# Patient Record
Sex: FEMALE | Race: WHITE | Employment: PART TIME | URBAN - METROPOLITAN AREA
[De-identification: names, ages, dates, MRNs, and addresses within clinical notes are randomized per-mention and may not be internally consistent; named-entity substitution may affect disease eponyms.]

---

## 2017-01-02 ENCOUNTER — TRANSFERRED RECORDS (OUTPATIENT)
Dept: HEALTH INFORMATION MANAGEMENT | Facility: CLINIC | Age: 37
End: 2017-01-02

## 2017-05-17 ENCOUNTER — TELEPHONE (OUTPATIENT)
Dept: FAMILY MEDICINE | Facility: CLINIC | Age: 37
End: 2017-05-17

## 2017-05-17 NOTE — TELEPHONE ENCOUNTER
Summary:    Patient is due/failing the following:   FOLLOW UP, PAP and PHQ9    Action needed:   Patient needs office visit for PAP and follow up. and Patient needs to do PHQ9.    Type of outreach:    Sent Freenom message.    Questions for provider review:    None                                                                                                                                    Yesica Donaldson       Chart routed to Care Team .        Panel Management Review      Patient has the following on her problem list:     Depression / Dysthymia review  PHQ-9 SCORE 1/5/2016 4/18/2016 12/15/2016   Total Score - - -   Total Score MyChart - 9 (Mild depression) -   Total Score 13 - 12      Patient is due for:  PHQ9      Composite cancer screening  Chart review shows that this patient is due/due soon for the following Pap Smear

## 2017-05-22 ENCOUNTER — OFFICE VISIT (OUTPATIENT)
Dept: FAMILY MEDICINE | Facility: CLINIC | Age: 37
End: 2017-05-22
Payer: COMMERCIAL

## 2017-05-22 VITALS
DIASTOLIC BLOOD PRESSURE: 60 MMHG | HEIGHT: 66 IN | RESPIRATION RATE: 12 BRPM | WEIGHT: 126.7 LBS | HEART RATE: 100 BPM | TEMPERATURE: 98.5 F | BODY MASS INDEX: 20.36 KG/M2 | OXYGEN SATURATION: 100 % | SYSTOLIC BLOOD PRESSURE: 94 MMHG

## 2017-05-22 DIAGNOSIS — D50.9 IRON DEFICIENCY ANEMIA, UNSPECIFIED IRON DEFICIENCY ANEMIA TYPE: ICD-10-CM

## 2017-05-22 DIAGNOSIS — Z13.220 LIPID SCREENING: ICD-10-CM

## 2017-05-22 DIAGNOSIS — K52.9 COLITIS: ICD-10-CM

## 2017-05-22 DIAGNOSIS — J20.9 ACUTE BRONCHITIS, UNSPECIFIED ORGANISM: ICD-10-CM

## 2017-05-22 DIAGNOSIS — J01.00 ACUTE MAXILLARY SINUSITIS, RECURRENCE NOT SPECIFIED: Primary | ICD-10-CM

## 2017-05-22 DIAGNOSIS — F33.0 MAJOR DEPRESSIVE DISORDER, RECURRENT EPISODE, MILD (H): ICD-10-CM

## 2017-05-22 LAB
BASOPHILS # BLD AUTO: 0 10E9/L (ref 0–0.2)
BASOPHILS NFR BLD AUTO: 0.2 %
CHOLEST SERPL-MCNC: 119 MG/DL
DIFFERENTIAL METHOD BLD: ABNORMAL
EOSINOPHIL # BLD AUTO: 0.2 10E9/L (ref 0–0.7)
EOSINOPHIL NFR BLD AUTO: 1.4 %
ERYTHROCYTE [DISTWIDTH] IN BLOOD BY AUTOMATED COUNT: 13.9 % (ref 10–15)
FERRITIN SERPL-MCNC: 111 NG/ML (ref 12–150)
FOLATE SERPL-MCNC: 9.8 NG/ML
HCT VFR BLD AUTO: 38.3 % (ref 35–47)
HDLC SERPL-MCNC: 31 MG/DL
HGB BLD-MCNC: 12.2 G/DL (ref 11.7–15.7)
IRON SATN MFR SERPL: 5 % (ref 15–46)
IRON SERPL-MCNC: 10 UG/DL (ref 35–180)
LDLC SERPL CALC-MCNC: 73 MG/DL
LYMPHOCYTES # BLD AUTO: 1.4 10E9/L (ref 0.8–5.3)
LYMPHOCYTES NFR BLD AUTO: 12.2 %
MCH RBC QN AUTO: 27.7 PG (ref 26.5–33)
MCHC RBC AUTO-ENTMCNC: 31.9 G/DL (ref 31.5–36.5)
MCV RBC AUTO: 87 FL (ref 78–100)
MONOCYTES # BLD AUTO: 0.8 10E9/L (ref 0–1.3)
MONOCYTES NFR BLD AUTO: 7.2 %
NEUTROPHILS # BLD AUTO: 9.2 10E9/L (ref 1.6–8.3)
NEUTROPHILS NFR BLD AUTO: 79 %
NONHDLC SERPL-MCNC: 88 MG/DL
PLATELET # BLD AUTO: 354 10E9/L (ref 150–450)
RBC # BLD AUTO: 4.41 10E12/L (ref 3.8–5.2)
TIBC SERPL-MCNC: 212 UG/DL (ref 240–430)
TRIGL SERPL-MCNC: 75 MG/DL
VIT B12 SERPL-MCNC: 404 PG/ML (ref 193–986)
WBC # BLD AUTO: 11.6 10E9/L (ref 4–11)

## 2017-05-22 PROCEDURE — 85025 COMPLETE CBC W/AUTO DIFF WBC: CPT | Performed by: NURSE PRACTITIONER

## 2017-05-22 PROCEDURE — 83550 IRON BINDING TEST: CPT | Performed by: NURSE PRACTITIONER

## 2017-05-22 PROCEDURE — 82746 ASSAY OF FOLIC ACID SERUM: CPT | Performed by: NURSE PRACTITIONER

## 2017-05-22 PROCEDURE — 80061 LIPID PANEL: CPT | Performed by: NURSE PRACTITIONER

## 2017-05-22 PROCEDURE — 99214 OFFICE O/P EST MOD 30 MIN: CPT | Performed by: NURSE PRACTITIONER

## 2017-05-22 PROCEDURE — 82607 VITAMIN B-12: CPT | Performed by: NURSE PRACTITIONER

## 2017-05-22 PROCEDURE — 36415 COLL VENOUS BLD VENIPUNCTURE: CPT | Performed by: NURSE PRACTITIONER

## 2017-05-22 PROCEDURE — 82728 ASSAY OF FERRITIN: CPT | Performed by: NURSE PRACTITIONER

## 2017-05-22 PROCEDURE — 83540 ASSAY OF IRON: CPT | Performed by: NURSE PRACTITIONER

## 2017-05-22 RX ORDER — AZITHROMYCIN 250 MG/1
TABLET, FILM COATED ORAL
Qty: 6 TABLET | Refills: 0 | Status: SHIPPED | OUTPATIENT
Start: 2017-05-22 | End: 2017-07-28

## 2017-05-22 RX ORDER — CODEINE PHOSPHATE AND GUAIFENESIN 10; 100 MG/5ML; MG/5ML
1-2 SOLUTION ORAL
Qty: 175 ML | Refills: 0 | Status: SHIPPED | OUTPATIENT
Start: 2017-05-22 | End: 2017-07-28

## 2017-05-22 ASSESSMENT — ANXIETY QUESTIONNAIRES
2. NOT BEING ABLE TO STOP OR CONTROL WORRYING: MORE THAN HALF THE DAYS
6. BECOMING EASILY ANNOYED OR IRRITABLE: SEVERAL DAYS
5. BEING SO RESTLESS THAT IT IS HARD TO SIT STILL: MORE THAN HALF THE DAYS
GAD7 TOTAL SCORE: 13
7. FEELING AFRAID AS IF SOMETHING AWFUL MIGHT HAPPEN: MORE THAN HALF THE DAYS
1. FEELING NERVOUS, ANXIOUS, OR ON EDGE: MORE THAN HALF THE DAYS
3. WORRYING TOO MUCH ABOUT DIFFERENT THINGS: MORE THAN HALF THE DAYS
IF YOU CHECKED OFF ANY PROBLEMS ON THIS QUESTIONNAIRE, HOW DIFFICULT HAVE THESE PROBLEMS MADE IT FOR YOU TO DO YOUR WORK, TAKE CARE OF THINGS AT HOME, OR GET ALONG WITH OTHER PEOPLE: SOMEWHAT DIFFICULT

## 2017-05-22 ASSESSMENT — PAIN SCALES - GENERAL: PAINLEVEL: SEVERE PAIN (6)

## 2017-05-22 ASSESSMENT — PATIENT HEALTH QUESTIONNAIRE - PHQ9: 5. POOR APPETITE OR OVEREATING: MORE THAN HALF THE DAYS

## 2017-05-22 NOTE — LETTER
May 22, 2017      Soo Timmons  [address] [facility] [address]              RE: Soo Timmons,    Soo was seen and treated today for a illness.    Sincerely,          Rossi Ambrose DNP
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
## 2017-05-22 NOTE — MR AVS SNAPSHOT
After Visit Summary   5/22/2017    Soo Timmons    MRN: [id number]           Patient Information     Date Of Birth          1980        Visit Information        Provider Department      5/22/2017 9:40 AM Rossi Ambroes APRN CNP [facility]        Today's Diagnoses     Acute maxillary sinusitis, recurrence not specified    -  1    Acute bronchitis, unspecified organism        Iron deficiency anemia, unspecified iron deficiency anemia type        Colitis        Lipid screening        Major depressive disorder, recurrent episode, mild (H)           Follow-ups after your visit        Who to contact     If you have questions or need follow up information about today's clinic visit or your schedule please contact [facility] directly at [phone].  Normal or non-critical lab and imaging results will be communicated to you by [employer], letter or phone within 4 business days after the clinic has received the results. If you do not hear from us within 7 days, please contact the clinic through [employer] or phone. If you have a critical or abnormal lab result, we will notify you by phone as soon as possible.  Submit refill requests through [employer] or call your pharmacy and they will forward the refill request to us. Please allow 3 business days for your refill to be completed.          Additional Information About Your Visit        MyChart Information     [employer] gives you secure access to your electronic health record. If you see a primary care provider, you can also send messages to your care team and make appointments. If you have questions, please call your primary care clinic.  If you do not have a primary care provider, please call [phone] and they will assist you.        Care EveryWhere ID     This is your Care EveryWhere ID. This could be used by other organizations to access your [facility] medical records  [id number]        Your Vitals Were     Pulse Temperature  "Respirations Height Pulse Oximetry BMI (Body Mass Index)    100 98.5  F (36.9  C) (Temporal) 12 5' 6\" (1.676 m) 100% 20.45 kg/m2       Blood Pressure from Last 3 Encounters:   05/22/17 94/60   12/15/16 96/66   11/05/15 102/62    Weight from Last 3 Encounters:   05/22/17 126 lb 11.2 oz (57.5 kg)   12/15/16 118 lb 11.2 oz (53.8 kg)   11/05/15 121 lb 14.4 oz (55.3 kg)              We Performed the Following     CBC with platelets differential     Ferritin     Folate     Iron and iron binding capacity     Lipid panel reflex to direct LDL     Vitamin B12          Today's Medication Changes          These changes are accurate as of: 5/22/17 11:59 PM.  If you have any questions, ask your nurse or doctor.               Start taking these medicines.        Dose/Directions    azithromycin 250 MG tablet   Commonly known as:  ZITHROMAX   Used for:  Acute maxillary sinusitis, recurrence not specified, Acute bronchitis, unspecified organism   Started by:  Rossi Ambrose APRN CNP        2 tablets daily on day one, then one tablet po daily until gone.   Quantity:  6 tablet   Refills:  0       guaiFENesin-codeine 100-10 MG/5ML Soln solution   Commonly known as:  ROBITUSSIN AC   Used for:  Acute bronchitis, unspecified organism   Started by:  Rossi Ambrose APRN CNP        Dose:  1-2 tsp.   Take 5-10 mLs by mouth nightly as needed for cough   Quantity:  175 mL   Refills:  0            Where to get your medicines      These medications were sent to [id number] - [place], [place] - [address] [address]  [address] [address] [address]     Phone:  [phone]     azithromycin 250 MG tablet         Some of these will need a paper prescription and others can be bought over the counter.  Ask your nurse if you have questions.     Bring a paper prescription for each of these medications     guaiFENesin-codeine 100-10 MG/5ML Soln solution                Primary Care Provider Office Phone # Fax #    FRANCESCA Patiño CNP " [phone]       [facility] [address]  [place] [address]        Goals        General    Psychosocial I will devlope a safety plan (pt-stated)     Notes - Note created  11/5/2015  3:13 PM by Magdalena Warren LSW    As of today's date 11/5/2015 goal is met at 0 - 25%.   Goal Status:  Active      Psychosocial/ I will go to talk with Lalita Watters (pt-stated)     Notes - Note created  11/5/2015  3:12 PM by Magdalena Warren LSW    As of today's date 11/5/2015 goal is met at 0 - 25%.   Goal Status:  Active        Thank you!     Thank you for choosing [facility]  for your care. Our goal is always to provide you with excellent care. Hearing back from our patients is one way we can continue to improve our services. Please take a few minutes to complete the written survey that you may receive in the mail after your visit with us. Thank you!             Your Updated Medication List - Protect others around you: Learn how to safely use, store and throw away your medicines at www.disposemymeds.org.          This list is accurate as of: 5/22/17 11:59 PM.  Always use your most recent med list.                   Brand Name Dispense Instructions for use    azithromycin 250 MG tablet    ZITHROMAX    6 tablet    2 tablets daily on day one, then one tablet po daily until gone.       clonazePAM 0.5 MG tablet    klonoPIN    10 tablet    Take 0.5-1 tablets (0.25-0.5 mg) by mouth daily as needed for anxiety       FLUoxetine 20 MG capsule    PROzac    90 capsule    Take 3 capsules (60 mg) by mouth daily       guaiFENesin-codeine 100-10 MG/5ML Soln solution    ROBITUSSIN AC    175 mL    Take 5-10 mLs by mouth nightly as needed for cough       metoclopramide 10 MG tablet    REGLAN    120 tablet    Take 1 tablet (10 mg) by mouth 4 times daily (before meals and nightly)       MIRENA (52 MG) 20 MCG/24HR IUD   Generic drug:  levonorgestrel     1 Intra Uterine Device    intrauterine uterine  device placed       Multi-vitamin Tabs tablet      Take 1 tablet by mouth daily       predniSONE 5 MG tablet    DELTASONE    81 tablet    4 tbs qd X 1wk, 3 tbs qd X 1wk, 2 tbs qd X 1wk, 1 tb qd X 1wk, then 1tb qod X 1 wk       PROBIOTIC ADVANCED PO

## 2017-05-22 NOTE — NURSING NOTE
"Chief Complaint   Patient presents with     Cough     x 2 weeks     Sinus Problem     Panel Management     Pap, PHQ-9/JUJU       Initial BP 94/60 (BP Location: Left arm, Patient Position: Chair, Cuff Size: Adult Regular)  Pulse 100  Temp 98.5  F (36.9  C) (Temporal)  Resp 12  Ht 5' 6\" (1.676 m)  Wt 126 lb 11.2 oz (57.5 kg)  SpO2 100%  BMI 20.45 kg/m2 Estimated body mass index is 20.45 kg/(m^2) as calculated from the following:    Height as of this encounter: 5' 6\" (1.676 m).    Weight as of this encounter: 126 lb 11.2 oz (57.5 kg).  Medication Reconciliation: complete  Amelie Soriano CMA (AAMA)    "

## 2017-05-23 ASSESSMENT — PATIENT HEALTH QUESTIONNAIRE - PHQ9: SUM OF ALL RESPONSES TO PHQ QUESTIONS 1-9: 10

## 2017-05-23 ASSESSMENT — ANXIETY QUESTIONNAIRES: GAD7 TOTAL SCORE: 13

## 2017-07-28 ENCOUNTER — HOSPITAL ENCOUNTER (EMERGENCY)
Facility: CLINIC | Age: 37
Discharge: HOME | End: 2017-07-28
Attending: PHYSICIAN ASSISTANT | Admitting: PHYSICIAN ASSISTANT
Payer: COMMERCIAL

## 2017-07-28 ENCOUNTER — APPOINTMENT (OUTPATIENT)
Dept: GENERAL RADIOLOGY | Facility: CLINIC | Age: 37
End: 2017-07-28
Attending: PHYSICIAN ASSISTANT
Payer: COMMERCIAL

## 2017-07-28 ENCOUNTER — APPOINTMENT (OUTPATIENT)
Dept: CT IMAGING | Facility: CLINIC | Age: 37
End: 2017-07-28
Attending: PHYSICIAN ASSISTANT
Payer: COMMERCIAL

## 2017-07-28 VITALS
BODY MASS INDEX: 18.49 KG/M2 | OXYGEN SATURATION: 100 % | DIASTOLIC BLOOD PRESSURE: 69 MMHG | SYSTOLIC BLOOD PRESSURE: 98 MMHG | WEIGHT: 122 LBS | HEART RATE: 86 BPM | TEMPERATURE: 97.8 F | HEIGHT: 68 IN | RESPIRATION RATE: 18 BRPM

## 2017-07-28 DIAGNOSIS — K50.00 CROHN'S DISEASE OF SMALL INTESTINE WITHOUT COMPLICATION (H): ICD-10-CM

## 2017-07-28 DIAGNOSIS — R10.31 RLQ ABDOMINAL PAIN: ICD-10-CM

## 2017-07-28 DIAGNOSIS — K52.9 COLITIS: ICD-10-CM

## 2017-07-28 LAB
ALBUMIN SERPL-MCNC: 2.6 G/DL (ref 3.4–5)
ALBUMIN UR-MCNC: NEGATIVE MG/DL
ALP SERPL-CCNC: 78 U/L (ref 40–150)
ALT SERPL W P-5'-P-CCNC: 14 U/L (ref 0–50)
ANION GAP SERPL CALCULATED.3IONS-SCNC: 6 MMOL/L (ref 3–14)
APPEARANCE UR: ABNORMAL
AST SERPL W P-5'-P-CCNC: 11 U/L (ref 0–45)
BACTERIA #/AREA URNS HPF: ABNORMAL /HPF
BASOPHILS # BLD AUTO: 0 10E9/L (ref 0–0.2)
BASOPHILS NFR BLD AUTO: 0.2 %
BILIRUB SERPL-MCNC: 0.5 MG/DL (ref 0.2–1.3)
BILIRUB UR QL STRIP: NEGATIVE
BUN SERPL-MCNC: 7 MG/DL (ref 7–30)
CALCIUM SERPL-MCNC: 8.1 MG/DL (ref 8.5–10.1)
CAOX CRY #/AREA URNS HPF: ABNORMAL /HPF
CHLORIDE SERPL-SCNC: 110 MMOL/L (ref 94–109)
CO2 SERPL-SCNC: 26 MMOL/L (ref 20–32)
COLOR UR AUTO: YELLOW
CREAT SERPL-MCNC: 0.86 MG/DL (ref 0.52–1.04)
CRP SERPL-MCNC: 14.7 MG/L (ref 0–8)
DIFFERENTIAL METHOD BLD: ABNORMAL
EOSINOPHIL # BLD AUTO: 0.1 10E9/L (ref 0–0.7)
EOSINOPHIL NFR BLD AUTO: 1.7 %
ERYTHROCYTE [DISTWIDTH] IN BLOOD BY AUTOMATED COUNT: 14.6 % (ref 10–15)
ERYTHROCYTE [SEDIMENTATION RATE] IN BLOOD BY WESTERGREN METHOD: 8 MM/H (ref 0–20)
GFR SERPL CREATININE-BSD FRML MDRD: 75 ML/MIN/1.7M2
GLUCOSE SERPL-MCNC: 86 MG/DL (ref 70–99)
GLUCOSE UR STRIP-MCNC: NEGATIVE MG/DL
HCG UR QL: NEGATIVE
HCT VFR BLD AUTO: 43.2 % (ref 35–47)
HGB BLD-MCNC: 13.5 G/DL (ref 11.7–15.7)
HGB UR QL STRIP: NEGATIVE
IMM GRANULOCYTES # BLD: 0 10E9/L (ref 0–0.4)
IMM GRANULOCYTES NFR BLD: 0.1 %
KETONES UR STRIP-MCNC: 5 MG/DL
LEUKOCYTE ESTERASE UR QL STRIP: ABNORMAL
LYMPHOCYTES # BLD AUTO: 1.9 10E9/L (ref 0.8–5.3)
LYMPHOCYTES NFR BLD AUTO: 23.8 %
MCH RBC QN AUTO: 27.3 PG (ref 26.5–33)
MCHC RBC AUTO-ENTMCNC: 31.3 G/DL (ref 31.5–36.5)
MCV RBC AUTO: 87 FL (ref 78–100)
MONOCYTES # BLD AUTO: 0.7 10E9/L (ref 0–1.3)
MONOCYTES NFR BLD AUTO: 8.3 %
MUCOUS THREADS #/AREA URNS LPF: PRESENT /LPF
NEUTROPHILS # BLD AUTO: 5.3 10E9/L (ref 1.6–8.3)
NEUTROPHILS NFR BLD AUTO: 65.9 %
NITRATE UR QL: NEGATIVE
PH UR STRIP: 5 PH (ref 5–7)
PLATELET # BLD AUTO: 444 10E9/L (ref 150–450)
POTASSIUM SERPL-SCNC: 3.6 MMOL/L (ref 3.4–5.3)
PROT SERPL-MCNC: 5.6 G/DL (ref 6.8–8.8)
RBC # BLD AUTO: 4.94 10E12/L (ref 3.8–5.2)
RBC #/AREA URNS AUTO: 1 /HPF (ref 0–2)
SODIUM SERPL-SCNC: 142 MMOL/L (ref 133–144)
SP GR UR STRIP: 1.02 (ref 1–1.03)
SQUAMOUS #/AREA URNS AUTO: 7 /HPF (ref 0–1)
URN SPEC COLLECT METH UR: ABNORMAL
UROBILINOGEN UR STRIP-MCNC: 0 MG/DL (ref 0–2)
WBC # BLD AUTO: 8.1 10E9/L (ref 4–11)
WBC #/AREA URNS AUTO: 2 /HPF (ref 0–2)

## 2017-07-28 PROCEDURE — 99284 EMERGENCY DEPT VISIT MOD MDM: CPT | Mod: Z6 | Performed by: PHYSICIAN ASSISTANT

## 2017-07-28 PROCEDURE — 25000128 H RX IP 250 OP 636: Performed by: PHYSICIAN ASSISTANT

## 2017-07-28 PROCEDURE — 81025 URINE PREGNANCY TEST: CPT | Performed by: PHYSICIAN ASSISTANT

## 2017-07-28 PROCEDURE — 74020 XR ABDOMEN 2 VW: CPT | Mod: TC

## 2017-07-28 PROCEDURE — 85652 RBC SED RATE AUTOMATED: CPT | Performed by: PHYSICIAN ASSISTANT

## 2017-07-28 PROCEDURE — 96375 TX/PRO/DX INJ NEW DRUG ADDON: CPT | Performed by: PHYSICIAN ASSISTANT

## 2017-07-28 PROCEDURE — 85025 COMPLETE CBC W/AUTO DIFF WBC: CPT | Performed by: PHYSICIAN ASSISTANT

## 2017-07-28 PROCEDURE — 74177 CT ABD & PELVIS W/CONTRAST: CPT

## 2017-07-28 PROCEDURE — 81001 URINALYSIS AUTO W/SCOPE: CPT | Performed by: PHYSICIAN ASSISTANT

## 2017-07-28 PROCEDURE — 25000125 ZZHC RX 250: Performed by: PHYSICIAN ASSISTANT

## 2017-07-28 PROCEDURE — 80053 COMPREHEN METABOLIC PANEL: CPT | Performed by: PHYSICIAN ASSISTANT

## 2017-07-28 PROCEDURE — 86140 C-REACTIVE PROTEIN: CPT | Performed by: PHYSICIAN ASSISTANT

## 2017-07-28 PROCEDURE — 96361 HYDRATE IV INFUSION ADD-ON: CPT | Performed by: PHYSICIAN ASSISTANT

## 2017-07-28 PROCEDURE — 99285 EMERGENCY DEPT VISIT HI MDM: CPT | Mod: 25 | Performed by: PHYSICIAN ASSISTANT

## 2017-07-28 PROCEDURE — 96374 THER/PROPH/DIAG INJ IV PUSH: CPT | Performed by: PHYSICIAN ASSISTANT

## 2017-07-28 RX ORDER — HYDROMORPHONE HYDROCHLORIDE 1 MG/ML
0.5 INJECTION, SOLUTION INTRAMUSCULAR; INTRAVENOUS; SUBCUTANEOUS
Status: DISCONTINUED | OUTPATIENT
Start: 2017-07-28 | End: 2017-07-28 | Stop reason: HOSPADM

## 2017-07-28 RX ORDER — ONDANSETRON 4 MG/1
4 TABLET, ORALLY DISINTEGRATING ORAL EVERY 8 HOURS PRN
Qty: 10 TABLET | Refills: 0 | Status: SHIPPED | OUTPATIENT
Start: 2017-07-28 | End: 2017-07-31

## 2017-07-28 RX ORDER — OXYCODONE AND ACETAMINOPHEN 5; 325 MG/1; MG/1
1 TABLET ORAL EVERY 6 HOURS PRN
Qty: 10 TABLET | Refills: 0 | Status: SHIPPED | OUTPATIENT
Start: 2017-07-28 | End: 2017-08-03

## 2017-07-28 RX ORDER — SODIUM CHLORIDE 9 MG/ML
INJECTION, SOLUTION INTRAVENOUS CONTINUOUS
Status: DISCONTINUED | OUTPATIENT
Start: 2017-07-28 | End: 2017-07-28 | Stop reason: HOSPADM

## 2017-07-28 RX ORDER — PREDNISONE 5 MG/1
TABLET ORAL
Qty: 81 TABLET | Refills: 0 | Status: SHIPPED | OUTPATIENT
Start: 2017-07-28 | End: 2017-08-31

## 2017-07-28 RX ORDER — ONDANSETRON 2 MG/ML
4 INJECTION INTRAMUSCULAR; INTRAVENOUS EVERY 30 MIN PRN
Status: DISCONTINUED | OUTPATIENT
Start: 2017-07-28 | End: 2017-07-28 | Stop reason: HOSPADM

## 2017-07-28 RX ORDER — IOPAMIDOL 755 MG/ML
100 INJECTION, SOLUTION INTRAVASCULAR ONCE
Status: COMPLETED | OUTPATIENT
Start: 2017-07-28 | End: 2017-07-28

## 2017-07-28 RX ORDER — LIDOCAINE 40 MG/G
CREAM TOPICAL
Status: DISCONTINUED | OUTPATIENT
Start: 2017-07-28 | End: 2017-07-28 | Stop reason: HOSPADM

## 2017-07-28 RX ADMIN — ONDANSETRON 4 MG: 2 INJECTION INTRAMUSCULAR; INTRAVENOUS at 16:00

## 2017-07-28 RX ADMIN — SODIUM CHLORIDE 1000 ML: 9 INJECTION, SOLUTION INTRAVENOUS at 16:02

## 2017-07-28 RX ADMIN — ONDANSETRON 4 MG: 2 INJECTION INTRAMUSCULAR; INTRAVENOUS at 17:13

## 2017-07-28 RX ADMIN — HYDROMORPHONE HYDROCHLORIDE 0.5 MG: 1 INJECTION, SOLUTION INTRAMUSCULAR; INTRAVENOUS; SUBCUTANEOUS at 17:25

## 2017-07-28 RX ADMIN — SODIUM CHLORIDE 60 ML: 9 INJECTION, SOLUTION INTRAVENOUS at 16:55

## 2017-07-28 RX ADMIN — SODIUM CHLORIDE 1000 ML: 9 INJECTION, SOLUTION INTRAVENOUS at 14:59

## 2017-07-28 RX ADMIN — IOPAMIDOL 59 ML: 755 INJECTION, SOLUTION INTRAVENOUS at 16:54

## 2017-07-28 ASSESSMENT — ENCOUNTER SYMPTOMS
ABDOMINAL PAIN: 1
DIARRHEA: 1
VOMITING: 1
FEVER: 1
NAUSEA: 1

## 2017-07-28 NOTE — LETTER
[facility] EMERGENCY DEPARTMENT  [address] [place] [address]  [phone]    2017    Soo Timmons  [address] [facility] [address]  [phone] (home)     : 1980      To Whom it may concern:    Soo Timmons was seen in our Emergency Department today, 2017.  I expect her condition to improve over the next few days.  Please excuse her from work on 2017 and 2017 due to her current health condition.      Sincerely,              Boone Og PA-C

## 2017-07-28 NOTE — ED AVS SNAPSHOT
[facility]    [address] [place] [address]    Phone:  [phone]    Fax:  [phone]                                       Soo Timmons   MRN: [id number]    Department:  [facility]   Date of Visit:  7/28/2017           Patient Information     Date Of Birth          1980        Your diagnoses for this visit were:     Crohn's disease of small intestine without complication (H)     RLQ abdominal pain     Colitis        You were seen by Boone Og PA-C.      Follow-up Information     Follow up with Rossi Ambrose, FRANCESCA CNP. Schedule an appointment as soon as possible for a visit in 5 days.    Specialty:  Family Practice    Why:  For abdominal pain recheck    Contact information:    [facility]  [address]  [place] [address]  [phone]        24 Hour Appointment Hotline       To make an appointment at any [facility], call [phone]). If you don't have a family doctor or clinic, we will help you find one. [facility] are conveniently located to serve the needs of you and your family.             Review of your medicines      START taking        Dose / Directions Last dose taken    ondansetron 4 MG ODT tab   Commonly known as:  ZOFRAN ODT   Dose:  4 mg   Quantity:  10 tablet        Take 1 tablet (4 mg) by mouth every 8 hours as needed for nausea   Refills:  0        oxyCODONE-acetaminophen 5-325 MG per tablet   Commonly known as:  PERCOCET   Dose:  1 tablet   Quantity:  10 tablet        Take 1 tablet by mouth every 6 hours as needed   Refills:  0          Our records show that you are taking the medicines listed below. If these are incorrect, please call your family doctor or clinic.        Dose / Directions Last dose taken    clonazePAM 0.5 MG tablet   Commonly known as:  klonoPIN   Dose:  0.25-0.5 mg   Quantity:  10 tablet        Take 0.5-1 tablets (0.25-0.5 mg) by mouth  daily as needed for anxiety   Refills:  0        FLUoxetine 20 MG capsule   Commonly known as:  PROzac   Dose:  60 mg   Quantity:  90 capsule        Take 3 capsules (60 mg) by mouth daily   Refills:  1        methylPREDNISolone 4 MG tablet   Commonly known as:  MEDROL DOSEPAK   Quantity:  21 tablet        Follow package instructions   Refills:  0        metoclopramide 10 MG tablet   Commonly known as:  REGLAN   Dose:  10 mg   Quantity:  120 tablet        Take 1 tablet (10 mg) by mouth 4 times daily (before meals and nightly)   Refills:  1        MIRENA (52 MG) 20 MCG/24HR IUD   Quantity:  1 Intra Uterine Device   Generic drug:  levonorgestrel        intrauterine uterine device placed   Refills:  0        Multi-vitamin Tabs tablet   Dose:  1 tablet        Take 1 tablet by mouth daily   Refills:  0        predniSONE 5 MG tablet   Commonly known as:  DELTASONE   Quantity:  81 tablet        4 tbs qd X 1wk, 3 tbs qd X 1wk, 2 tbs qd X 1wk, 1 tb qd X 1wk, then 1tb qod X 1 wk   Refills:  0        PROBIOTIC ADVANCED PO        Refills:  0          STOP taking        Dose Reason for stopping Comments    azithromycin 250 MG tablet   Commonly known as:  ZITHROMAX              guaiFENesin-codeine 100-10 MG/5ML Soln solution   Commonly known as:  ROBITUSSIN AC                      Prescriptions were sent or printed at these locations (3 Prescriptions)                   [facility] [place], [place] - [address]   [address] [facility] [address]    Telephone:  [phone]   Fax:  [phone]   Hours:                  E-Prescribed (2 of 3)         predniSONE (DELTASONE) 5 MG tablet               ondansetron (ZOFRAN ODT) 4 MG ODT tab                 Printed at Department/Unit printer (1 of 3)         oxyCODONE-acetaminophen (PERCOCET) 5-325 MG per tablet                Procedures and tests performed during your visit     CBC with platelets differential    CRP inflammation    CT Abdomen Pelvis w Contrast    Comprehensive  metabolic panel    Erythrocyte sedimentation rate auto    HCG qualitative urine    Peripheral IV catheter    UA reflex to Microscopic and Culture    XR Abdomen 2 Views      Orders Needing Specimen Collection     Ordered          07/28/17 1548  Enteric Bacteria and Virus Panel by JACKIE Stool - STAT, Prio: STAT, Needs to be Collected     Scheduled Task Status   07/28/17 1549 Collect Enteric Bacteria and Virus Panel by JACKIE Stool Open   Order Class:  PCU Collect                07/28/17 1548  Ova and Parasite Exam Routine - STAT, Prio: STAT, Needs to be Collected     Scheduled Task Status   07/28/17 1549 Collect Ova and Parasite Exam Routine Open   Order Class:  PCU Collect                07/28/17 1548  Clostridium difficile toxin B PCR - STAT, Prio: STAT, Needs to be Collected     Scheduled Task Status   07/28/17 1549 Collect Clostridium difficile toxin B PCR Open   Order Class:  PCU Collect                07/28/17 1548  Giardia antigen - STAT, Prio: STAT, Needs to be Collected     Scheduled Task Status   07/28/17 1549 Collect Giardia antigen Open   Order Class:  PCU Collect                  Pending Results     No orders found from 7/26/2017 to 7/29/2017.            Pending Culture Results     No orders found from 7/26/2017 to 7/29/2017.            Pending Results Instructions     If you had any lab results that were not finalized at the time of your Discharge, you can call the ED Lab Result RN at [phone]. You will be contacted by this team for any positive Lab results or changes in treatment. The nurses are available 7 days a week from 10A to 6:30P.  You can leave a message 24 hours per day and they will return your call.        Thank you for choosing [facility]       Thank you for choosing [facility] for your care. Our goal is always to provide you with excellent care. Hearing back from our patients is one way we can continue to improve our services. Please take a few minutes to complete the written survey that you may  receive in the mail after you visit with us. Thank you!        [facility] Information     [employer] gives you secure access to your electronic health record. If you see a primary care provider, you can also send messages to your care team and make appointments. If you have questions, please call your primary care clinic.  If you do not have a primary care provider, please call [phone] and they will assist you.        Care EveryWhere ID     This is your Care EveryWhere ID. This could be used by other organizations to access your [place] medical records  [id number]        Equal Access to Services     [facility] : Kathi Dunlap, thelma joaquin, laura peña, hira palmer . So [facility] [phone].    ATENCIÓN: Si habla español, tiene a meier disposición servicios gratuitos de asistencia lingüística. Llame al [phone].    We comply with applicable federal civil rights laws and [place] laws. We do not discriminate on the basis of race, color, national origin, age, disability sex, sexual orientation or gender identity.            After Visit Summary       This is your record. Keep this with you and show to your community pharmacist(s) and doctor(s) at your next visit.

## 2017-07-28 NOTE — ED PROVIDER NOTES
History     Chief Complaint   Patient presents with     Dehydration     The history is provided by the patient.     Soo Timmons is a 37 year old female who presents for possible dehydration.  She has a h/o SB Crohn's disease and has been without insurance.  Therefore, she has not been able to see her GI for follow up since January of 2015. Soo states she was kayaking on Saturday and didn't drink enough fluids that day, she started vomiting that night and has continued to vomit ever since. She states she vomited twice today. She reports she has struggled with constipation for a long time. While taking prednisone, she would have a hard and mucousy bowel movement every other day. Without prednisone, she would be constipated for 3-5 days then have diarrhea for 2 days. Patient reports she took a Miralax on   Sunday but only had a small bowel movement. Her next bowel movement was yesterday, it was bloody and mucousy diarrhea. She states she has had bloody and mucousy diarrhea 6x today. Patient reports that the right sided abdominal pain then started Monday. She points out that she has a stricture in the same area of her abdominal pain that was found by a colonoscopy and endoscopy. Her pain last night was 8-9/10 but is 6-7/10 currently, she reports that her pain has been increasing throughout the week. Patient reports she is keeping fluids down but is only urinating once every 8 hours during the day and urinates once every 20 minutes during the night. She is currently feeling nauseated. Patient claims that she passed out on Tuesday while at work, she thinks it could be from the dehydration. Patient is also complaining of a low grade fever of 99.4.    Of note, patient still has her appendix but has her gallbladder removed. She has never been diagnosed with a small bowel obstruction. Patient states that there is no chance of pregnancy. She points out that she skipped last months period but she has had a period  since the last time she has had intercourse. Patient's daughter may have chron's disease as she is showing similar symptoms to Soo. Patient reports she is trying to eat better and take care of herself. Patient was steroid dependent but is no long taking steroids since November 2016          Last GI visit A/P on 10/2015 as noted below:  Colonoscopy 12/2013 (Jeri Crespo) reviewed again today: Normal colon/TI endoscopically. TI biopsies w/ chronic inflammation + granuloma, colonic biopsies normal. Discussed potential role for disease re-staging at this time.     ASSESSMENT/PLAN:     1. Small bowel Crohn's disease + suspected superimposed IBS with current steroid dependence and complicated by psychosocial factors, interval concern for idiosyncratic AZA intolerance reaction (though not all typical features were present) - concerns from my standpoint regarding current severity of Crohn's disease (despite prior history that warranted biologic therapy) that would give me pause to re-initiate further immunosuppressive therapy, would advise re-staging of disease to ensure we are offering the best/safest/cost-effective options for patient's treatment at this time  1. Recommend continuing to taper slowly off your prednisone (currently at 20mg daily) by 5mg/week over the next 3-4 weeks until off as we discussed in detail today.  - Recommend rechecking your labs in 4 weeks to reassess the status of your recurrent anemia (since resolved in the last several months) and inflammatory markers off of prednisone.     2. Continue to monitor for the danger signs/symptoms we reviewed together today: worsening abdominal/back/groin pain, worsening diarrhea, blood mixed into stools, persistent fevers/chills, progressive anemia (particulary with iron deficiency), unexpected weight loss, etc.  - Contact your us should these symptoms occur, particularly as we may advise further evaluation sooner accordingly (particularly imaging if back pain  worsens or if associated urinary symptoms).  - Suspecting IBD + Irritable Bowel Syndrome (IBS) component present based on our discussion today, will advise further in ongoing management.  - Recommend considering a trial of a daily probiotic agent for possible IBS component as we discussed today; some common options include: Align, Culturelle, Digestive Advantage, Florastor, Activia yogurt, or Kefir. Choose one agent (or a comparable generic OTC formulation) that is affordable/palatable and use it daily for at least 3-6 months to determine its baseline effect.     3. Unclear if the prodromal symptoms you experienced when you restarted the AZA (fever, joint pains, myalgias, nausea, no rash/vomiting) were truly related to the AZA or a coincidental viral illness, but for now, would advise holding off AZA at this time (and may consider not using this again in the future given possible idiosyncratic AZA intolerance).     4. Would strongly advise re-staging your Crohn's disease with imaging (MR or CT enterography) and endoscopic evaluation (EGD + colonoscopy +/- capsule study) once you are off steroids to confirm the extent/severity of your Crohn's disease, particularly as this will have impact on future treatment choices.  - Can discuss if there is a role for non-immunosuppressive therapies (Pentasa if small bowel-limited disease or other 5-ASAs) if mild mucosal disease is present.  - Can discuss role for immunomodulator (MTX) or biologic therapies if moderate/severe disease is present.     2. Colorectal Cancer Screening  No PSC or high-risk FH CRC (PGF w/ CRC <61yo though), no adenomatous lesions noted on 12/2013 colonoscopy. Will readdress once acute issues have stabilized.     RTC 2-3 months, sooner if symptomatic.      Thank you for this consultation. It was a pleasure to participate in the care of this patient; please contact us with any further questions. A total of 40 minutes was spent with this patient, 50% of  which was counseling regarding the above delineated issues.     Horace Loja MD    [facility] - Department of Medicine  Division of Gastroenterology      I have reviewed the Medications, Allergies, Past Medical and Surgical History, and Social History in the Epic system.    Allergies:   Allergies   Allergen Reactions     Banana      Anaphylaxis     Latex      Anaphylaxis     Prilosec [Omeprazole Magnesium] Diarrhea         No current facility-administered medications on file prior to encounter.   Current Outpatient Prescriptions on File Prior to Encounter:  methylPREDNISolone (MEDROL DOSEPAK) 4 MG tablet Follow package instructions   Probiotic Product (PROBIOTIC ADVANCED PO)    clonazePAM (KLONOPIN) 0.5 MG tablet Take 0.5-1 tablets (0.25-0.5 mg) by mouth daily as needed for anxiety   FLUoxetine (PROZAC) 20 MG capsule Take 3 capsules (60 mg) by mouth daily   multivitamin, therapeutic with minerals (MULTI-VITAMIN) TABS Take 1 tablet by mouth daily   metoclopramide (REGLAN) 10 MG tablet Take 1 tablet (10 mg) by mouth 4 times daily (before meals and nightly) (Patient not taking: Reported on 5/22/2017)   MIRENA 20 MCG/24HR IU IUD intrauterine uterine device placed       Patient Active Problem List   Diagnosis     Migraine with aura     Fatigue     Surveillance of previously prescribed intrauterine contraceptive device     Anemia     CARDIOVASCULAR SCREENING; LDL GOAL LESS THAN 160     Health Care Home     Anxiety     Colitis     Mild major depression (H)     GERD (gastroesophageal reflux disease)     Crohn's ileitis (H)     Malabsorption     Inflammatory bowel disease (Crohn's disease) (H)     Iron deficiency anemia     Vitamin B 12 deficiency     Irritable bowel syndrome       Past Surgical History:   Procedure Laterality Date     COLONOSCOPY  10/04/06    Luana MELÉNDEZ      COLONOSCOPY  12/20/2013    Procedure: COMBINED COLONOSCOPY, SINGLE BIOPSY/POLYPECTOMY BY BIOPSY;;  Surgeon:  "Presley Ocampo MD;  Location: [facility] OR     HC REMOVE INTRAUTERINE DEVICE  09/02/08     HC UGI ENDOSCOPY, SIMPLE EXAM  10/4/2006     LAPAROSCOPIC CHOLECYSTECTOMY  5/16/2011    Procedure:LAPAROSCOPIC CHOLECYSTECTOMY; Surgeon:LIYAH AVELAR; Location: OR       Social History   Substance Use Topics     Smoking status: Never Smoker     Smokeless tobacco: Never Used     Alcohol use No      Comment: rare       Most Recent Immunizations   Administered Date(s) Administered     DPT 09/05/1986     HepB-Peds 08/01/2007     Influenza (IIV3) 10/03/2016     Influenza Vaccine IM 3yrs+ 4 Valent IIV4 10/02/2015     MMR 12/04/1982     MMR Not Indicated - By Titer 02/26/2010     OPV 09/05/1986     Pneumococcal 23 valent 01/10/2014     TD (ADULT, 7+) 07/03/2002     Tdap (Adacel,Boostrix) 11/04/2011     Varicella Pt Report Hx of Varicella/Chicken Pox 02/26/2010       BMI: Estimated body mass index is 18.55 kg/(m^2) as calculated from the following:    Height as of this encounter: 1.727 m (5' 8\").    Weight as of this encounter: 55.3 kg (122 lb).      Review of Systems   Constitutional: Positive for fever (low grade, 99.4).   Gastrointestinal: Positive for abdominal pain (right sided, 8-9/10 last night but 6-7/10 this morning), diarrhea (since yesterday, bloody and mucousy), nausea and vomiting (since Sunday, twice today).   Genitourinary:        Urinating once every 8 hours during the day but once every 20 minutes at night.   Neurological:        Passed out on Tuesday while at work, she thinks it could be from the dehydration.   All other systems reviewed and are negative.      Physical Exam   BP: 104/72  Pulse: 74  Temp: 97.8  F (36.6  C)  Resp: 14  Height: 172.7 cm (5' 8\")  Weight: 55.3 kg (122 lb)  Physical Exam  Generally healthy appearing female in NAD who is active and non-toxic appearing.   Head: Normocephalic, atraumatic, nontender to palpation  Eyes: PERRLA, conjunctiva and sclera clear  Ears: Bilateral TM's and canals are " clear.  TM's translucent without erythema or effusion.  Nose: Nares normal and patent bilaterally.  Mucous membranes are non-erythematous and non-edematous.  No sinus tenderness.  Throat: Mucous membranes are dry.  No tonsilar hypertrophy, exudate, or erythema.  Neck: Supple.  FROM without pain.  No adenopathy.  No thyromegaly.   Heart:  RRR with normal S1 and S2.  No S3 or S4.  No murmur, rub, gallop, or click.  PMI is nondisplaced.   Lungs:  CTA bilaterally without wheezes, rales, or rhonchi.  Good breath sounds heard throughout all lung fields.  Tympanitic to percussion with no areas of dullness.   ABDOMEN: negative findings: no scars, striae, dilated veins, rashes, or lesions, umbilicus normal, symmetric, no masses palpable, no organomegaly, bowel sounds normal, no bruits heard, liver span normal to percussion, positive findings: tenderness moderate and rebound,guarding absent RLQ and periumbilical   Skin:  No rashes or lesions are noted on inspection of the torso, face, and upper extremities.       ED Course     ED Course     Procedures             Critical Care time:  none          Results for orders placed or performed during the hospital encounter of 07/28/17   XR Abdomen 2 Views    Narrative    XR ABDOMEN 2 VW 7/28/2017 3:13 PM    COMPARISON: 5/1/2015    HISTORY: Right lower quadrant pain, nausea, vomiting and diarrhea.      Impression    IMPRESSION: Gas-filled loops of large and small bowel with normal  course and caliber. Intrauterine device projects over the pelvis. No  free air. Cholecystectomy clips are seen. Lung bases are clear.    INDRA MARTINEZ   CT Abdomen Pelvis w Contrast    Narrative    CT ABDOMEN AND PELVIS WITH CONTRAST 7/28/2017 5:04 PM     HISTORY: Right lower quadrant abdominal pain     COMPARISON: CT abdomen and pelvis 10/21/2014.    TECHNIQUE: Axial images from the lung bases to the symphysis are  performed with additional coronal reformatted images. 59 mL of Isovue  370 are given  intravenously.  Radiation dose for this scan was reduced  using automated exposure control, adjustment of the mA and/or kV  according to patient size, or iterative reconstruction technique.    FINDINGS: The lung bases are clear.    ABDOMEN: The liver is unremarkable with mild periportal edema. Prior  cholecystectomy changes. The spleen, pancreas, adrenal glands and  kidneys are unremarkable. No hydronephrosis. No enlarged abdominal  lymph nodes. Segmental dilatation of the stomach, proximal duodenum  and 2 separate areas in the proximal jejunum are caused by areas of  focal small bowel wall thickening and luminal narrowing such as on  series 2, image 39 and series 2, image 32 concerning for inflammatory  bowel disease such as Crohn's. The mid to distal small bowel is  nondistended and the colon is unremarkable. Appendix is normal. No  diverticulitis.    PELVIS: The bladder, uterus with IUD, ovaries and rectum are  unremarkable. No enlarged pelvic lymph nodes. Trace amount of free  pelvic fluid is noted. Bone window examination is within normal  limits.      Impression    IMPRESSION:  1. Two new areas of focal small bowel wall thickening involving the  jejunum with marked interval dilatation likely accounting for  patient's abdominal pain. Findings are concerning for inflammatory  bowel disease such as Crohn's. Synchronous areas of infectious  enteritis thought to be less likely. No other areas of involvement are  appreciated. The mid to distal small bowel is nondistended. Appendix  is normal. Possible constipation.  2. Periportal edema likely related to vigorous rehydration.    LM HORN MD   CBC with platelets differential   Result Value Ref Range    WBC 8.1 4.0 - 11.0 10e9/L    RBC Count 4.94 3.8 - 5.2 10e12/L    Hemoglobin 13.5 11.7 - 15.7 g/dL    Hematocrit 43.2 35.0 - 47.0 %    MCV 87 78 - 100 fl    MCH 27.3 26.5 - 33.0 pg    MCHC 31.3 (L) 31.5 - 36.5 g/dL    RDW 14.6 10.0 - 15.0 %    Platelet Count 444 150 -  450 10e9/L    Diff Method Automated Method     % Neutrophils 65.9 %    % Lymphocytes 23.8 %    % Monocytes 8.3 %    % Eosinophils 1.7 %    % Basophils 0.2 %    % Immature Granulocytes 0.1 %    Absolute Neutrophil 5.3 1.6 - 8.3 10e9/L    Absolute Lymphocytes 1.9 0.8 - 5.3 10e9/L    Absolute Monocytes 0.7 0.0 - 1.3 10e9/L    Absolute Eosinophils 0.1 0.0 - 0.7 10e9/L    Absolute Basophils 0.0 0.0 - 0.2 10e9/L    Abs Immature Granulocytes 0.0 0 - 0.4 10e9/L   Comprehensive metabolic panel   Result Value Ref Range    Sodium 142 133 - 144 mmol/L    Potassium 3.6 3.4 - 5.3 mmol/L    Chloride 110 (H) 94 - 109 mmol/L    Carbon Dioxide 26 20 - 32 mmol/L    Anion Gap 6 3 - 14 mmol/L    Glucose 86 70 - 99 mg/dL    Urea Nitrogen 7 7 - 30 mg/dL    Creatinine 0.86 0.52 - 1.04 mg/dL    GFR Estimate 75 >60 mL/min/1.7m2    GFR Estimate If Black >90   GFR Calc   >60 mL/min/1.7m2    Calcium 8.1 (L) 8.5 - 10.1 mg/dL    Bilirubin Total 0.5 0.2 - 1.3 mg/dL    Albumin 2.6 (L) 3.4 - 5.0 g/dL    Protein Total 5.6 (L) 6.8 - 8.8 g/dL    Alkaline Phosphatase 78 40 - 150 U/L    ALT 14 0 - 50 U/L    AST 11 0 - 45 U/L   CRP inflammation   Result Value Ref Range    CRP Inflammation 14.7 (H) 0.0 - 8.0 mg/L   Erythrocyte sedimentation rate auto   Result Value Ref Range    Sed Rate 8 0 - 20 mm/h   UA reflex to Microscopic and Culture   Result Value Ref Range    Color Urine Yellow     Appearance Urine Slightly Cloudy     Glucose Urine Negative NEG mg/dL    Bilirubin Urine Negative NEG    Ketones Urine 5 (A) NEG mg/dL    Specific Gravity Urine 1.016 1.003 - 1.035    Blood Urine Negative NEG    pH Urine 5.0 5.0 - 7.0 pH    Protein Albumin Urine Negative NEG mg/dL    Urobilinogen mg/dL 0.0 0.0 - 2.0 mg/dL    Nitrite Urine Negative NEG    Leukocyte Esterase Urine Trace (A) NEG    Source Unspecified Urine     RBC Urine 1 0 - 2 /HPF    WBC Urine 2 0 - 2 /HPF    Bacteria Urine Few (A) NEG /HPF    Squamous Epithelial /HPF Urine 7 (H) 0 - 1 /HPF     Mucous Urine Present (A) NEG /LPF    Calcium Oxalate Moderate (A) NEG /HPF   HCG qualitative urine   Result Value Ref Range    HCG Qual Urine Negative NEG          No results found for this or any previous visit (from the past 24 hour(s)).    Medications   0.9% sodium chloride BOLUS (0 mLs Intravenous Stopped 7/28/17 1600)     Followed by   0.9% sodium chloride BOLUS (0 mLs Intravenous Stopped 7/28/17 1758)   sodium chloride 0.9 % for CT scan flush dose 500 mL (60 mLs Intravenous Given 7/28/17 1655)   sodium chloride (PF) 0.9% PF flush 3 mL (3 mLs Intravenous Given 7/28/17 1653)   iopamidol (ISOVUE-370) solution 100 mL (59 mLs Intravenous Given 7/28/17 1654)       Assessments & Plan (with Medical Decision Making)  RLQ abdominal pain with Crohn's disease exacerbation    37 year old female with a history of steroid-dependent Crohn's disease and no recent treatment due to his lack of follow-up with her gastroenterologist who presents for evaluation of abdominal pain, nausea, vomiting, and diarrhea stools with mucus and occasional blood.   She reports feeling constipated a few days prior, so she started taking MiraLAX. She states that she has a previous history of a stricture related to her Crohn's disease, and she is concerned that she may have an obstruction. She has had multiple bowel movements today. She states that she has having trouble drinking enough water to keep up. Denies any recent travel. No fevers. On exam she is afebrile and not tachycardic. Dry oral mucous membranes noted and she appears to have dehydration. Right lower quadrant and periumbilical abdominal discomfort without rebound or guarding. Initial therapy with Zofran IV and 2 L of IV normal saline for rehydration. She declined pain medications initially, but later on that visit she did ask for further pain management. She was given a dose of IV Dilaudid with good results. Abdominal flat plate and upright film did not show any sign of SBO.  Laboratory levels with a normal white blood cell count, normal hemoglobin, normal LFTs and normal sedimentation rate. Mild elevation in CRP to 14.7. She has a low albumin, low protein, and blood calcium, likely due to poor oral intake recently. Urinalysis negative and negative hCG. She appears to have a flare of her Crohn's disease. She was previously steroid-dependent, and has not been on any medication since about November of 2016 per patient report. Her last gastroenterology follow-up was in 2015. The patient was very anxious about her condition and fearful of what she may have. She requested a CT scan, even though her xray and labs were stable. I agreed to do this, and thankfully it returned only with findings suggestive of active Crohn's disease but no evidence for SBO, appendicitis, or other inflammatory abnormalities. Her chart was reviewed in detail. She has responded well to a tapering course of prednisone starting at 40 mg in the past prescribed by her gastroenterologist. I started the same taper for her. Possible side effects of medication discussed with her.  Zofran provided for nausea. I also sent her home with a small prescription for Percocet to use as needed for breakthrough pain. #10 tablets provided.    She will see her PCP in the next 4-5 days for a repeat evaluation to follow her progression. She agreed to follow up with gastroenterology as soon as possible as well. return to the ED prior to that and if symptoms significantly worsening or changing.  She was in agreement with this plan and was suitable for discharge. Her mother was present to drive her home.      I have reviewed the nursing notes.    I have reviewed the findings, diagnosis, plan and need for follow up with the patient.       Discharge Medication List as of 7/28/2017  5:59 PM      START taking these medications    Details   ondansetron (ZOFRAN ODT) 4 MG ODT tab Take 1 tablet (4 mg) by mouth every 8 hours as needed for nausea,  Disp-10 tablet, R-0, E-Prescribe      oxyCODONE-acetaminophen (PERCOCET) 5-325 MG per tablet Take 1 tablet by mouth every 6 hours as needed, Disp-10 tablet, R-0, Local Print             Final diagnoses:   Crohn's disease of small intestine without complication (H)   RLQ abdominal pain     This document serves as a record of services personally performed by Boone Og PA. It was created on their behalf by Denys Phoenix, a trained medical scribe. The creation of this record is based on the provider's personal observations and the statements of the patient. This document has been checked and approved by the attending provider.    Note: Chart documentation done in part with Dragon Voice Recognition software. Although reviewed after completion, some word and grammatical errors may remain.    7/28/2017   Boone Og PA-C   [facility] EMERGENCY DEPARTMENT     Boone Og PA-C  07/29/17 2835

## 2017-07-28 NOTE — ED AVS SNAPSHOT
[facility] Emergency Department    [address] [place] [address]    Phone:  [phone]    Fax:  [phone]                                       Soo Timmons   MRN: [id number]    Department:  [facility] Emergency Department   Date of Visit:  7/28/2017           After Visit Summary Signature Page     I have received my discharge instructions, and my questions have been answered. I have discussed any challenges I see with this plan with the nurse or doctor.    ..........................................................................................................................................  Patient/Patient Representative Signature      ..........................................................................................................................................  Patient Representative Print Name and Relationship to Patient    ..................................................               ................................................  Date                                            Time    ..........................................................................................................................................  Reviewed by Signature/Title    ...................................................              ..............................................  Date                                                            Time

## 2017-07-28 NOTE — ED NOTES
She had a very small formed stool.  Call placed to lab to see if we could use it and they said we would need much more and a loose sample.  Pt advised.

## 2017-08-02 ENCOUNTER — TELEPHONE (OUTPATIENT)
Dept: FAMILY MEDICINE | Facility: CLINIC | Age: 37
End: 2017-08-02

## 2017-08-03 ENCOUNTER — OFFICE VISIT (OUTPATIENT)
Dept: FAMILY MEDICINE | Facility: CLINIC | Age: 37
End: 2017-08-03
Payer: COMMERCIAL

## 2017-08-03 VITALS
DIASTOLIC BLOOD PRESSURE: 66 MMHG | RESPIRATION RATE: 16 BRPM | HEIGHT: 66 IN | TEMPERATURE: 99 F | HEART RATE: 84 BPM | WEIGHT: 127 LBS | BODY MASS INDEX: 20.41 KG/M2 | SYSTOLIC BLOOD PRESSURE: 96 MMHG

## 2017-08-03 DIAGNOSIS — F33.1 MAJOR DEPRESSIVE DISORDER, RECURRENT EPISODE, MODERATE (H): ICD-10-CM

## 2017-08-03 DIAGNOSIS — K50.018: Primary | ICD-10-CM

## 2017-08-03 DIAGNOSIS — F41.1 GAD (GENERALIZED ANXIETY DISORDER): ICD-10-CM

## 2017-08-03 PROCEDURE — 99214 OFFICE O/P EST MOD 30 MIN: CPT | Performed by: NURSE PRACTITIONER

## 2017-08-03 RX ORDER — FLUOXETINE 40 MG/1
40 CAPSULE ORAL DAILY
Qty: 90 CAPSULE | Refills: 1 | Status: SHIPPED | OUTPATIENT
Start: 2017-08-03 | End: 2017-12-07

## 2017-08-03 RX ORDER — CLONAZEPAM 0.5 MG/1
0.25-0.5 TABLET ORAL DAILY PRN
Qty: 10 TABLET | Refills: 0 | Status: CANCELLED | OUTPATIENT
Start: 2017-08-03

## 2017-08-03 RX ORDER — OXYCODONE AND ACETAMINOPHEN 5; 325 MG/1; MG/1
1 TABLET ORAL EVERY 6 HOURS PRN
Qty: 10 TABLET | Refills: 0 | Status: SHIPPED | OUTPATIENT
Start: 2017-08-03 | End: 2018-01-03

## 2017-08-03 ASSESSMENT — ANXIETY QUESTIONNAIRES
1. FEELING NERVOUS, ANXIOUS, OR ON EDGE: MORE THAN HALF THE DAYS
7. FEELING AFRAID AS IF SOMETHING AWFUL MIGHT HAPPEN: MORE THAN HALF THE DAYS
GAD7 TOTAL SCORE: 15
6. BECOMING EASILY ANNOYED OR IRRITABLE: MORE THAN HALF THE DAYS
2. NOT BEING ABLE TO STOP OR CONTROL WORRYING: MORE THAN HALF THE DAYS
3. WORRYING TOO MUCH ABOUT DIFFERENT THINGS: MORE THAN HALF THE DAYS
IF YOU CHECKED OFF ANY PROBLEMS ON THIS QUESTIONNAIRE, HOW DIFFICULT HAVE THESE PROBLEMS MADE IT FOR YOU TO DO YOUR WORK, TAKE CARE OF THINGS AT HOME, OR GET ALONG WITH OTHER PEOPLE: EXTREMELY DIFFICULT
5. BEING SO RESTLESS THAT IT IS HARD TO SIT STILL: MORE THAN HALF THE DAYS

## 2017-08-03 ASSESSMENT — PAIN SCALES - GENERAL: PAINLEVEL: MODERATE PAIN (5)

## 2017-08-03 ASSESSMENT — PATIENT HEALTH QUESTIONNAIRE - PHQ9: 5. POOR APPETITE OR OVEREATING: NEARLY EVERY DAY

## 2017-08-03 NOTE — MR AVS SNAPSHOT
After Visit Summary   8/3/2017    Soo Timmons    MRN: [id number]           Patient Information     Date Of Birth          1980        Visit Information        Provider Department      8/3/2017 4:40 PM Rossi Ambrose APRN CNP [facility]        Today's Diagnoses     Crohn's ileitis, other complication (H)    -  1    JUJU (generalized anxiety disorder)        Major depressive disorder, recurrent episode, moderate (H)           Follow-ups after your visit        Additional Services     GASTROENTEROLOGY ADULT REF CONSULT ONLY       Preferred Location: [facility] [phone] and [facility] [phone]      Please be aware that coverage of these services is subject to the terms and limitations of your health insurance plan.  Call member services at your health plan with any benefit or coverage questions.  Any procedures must be performed at a [facility] facility OR coordinated by your clinic's referral office.    Please bring the following with you to your appointment:    (1) Any X-Rays, CTs or MRIs which have been performed.  Contact the facility where they were done to arrange for  prior to your scheduled appointment.    (2) List of current medications   (3) This referral request   (4) Any documents/labs given to you for this referral                  Who to contact     If you have questions or need follow up information about today's clinic visit or your schedule please contact [facility] directly at [phone].  Normal or non-critical lab and imaging results will be communicated to you by MyChart, letter or phone within 4 business days after the clinic has received the results. If you do not hear from us within 7 days, please contact the clinic through MyChart or phone. If you have a critical or abnormal lab result, we will notify you by phone as soon as possible.  Submit refill requests through [employer] or call your pharmacy and they will forward the  "refill request to us. Please allow 3 business days for your refill to be completed.          Additional Information About Your Visit        [facility] Information     [employer] gives you secure access to your electronic health record. If you see a primary care provider, you can also send messages to your care team and make appointments. If you have questions, please call your primary care clinic.  If you do not have a primary care provider, please call [phone] and they will assist you.        Care EveryWhere ID     This is your Care EveryWhere ID. This could be used by other organizations to access your [place] medical records  [id number]        Your Vitals Were     Pulse Temperature Respirations Height Last Period BMI (Body Mass Index)    84 99  F (37.2  C) (Temporal) 16 5' 5.55\" (1.665 m) 06/18/2017 20.78 kg/m2       Blood Pressure from Last 3 Encounters:   08/03/17 96/66   07/28/17 98/69   05/22/17 94/60    Weight from Last 3 Encounters:   08/03/17 127 lb (57.6 kg)   07/28/17 122 lb (55.3 kg)   05/22/17 126 lb 11.2 oz (57.5 kg)              We Performed the Following     GASTROENTEROLOGY ADULT REF CONSULT ONLY          Today's Medication Changes          These changes are accurate as of: 8/3/17  7:39 PM.  If you have any questions, ask your nurse or doctor.               These medicines have changed or have updated prescriptions.        Dose/Directions    * FLUoxetine 20 MG capsule   Commonly known as:  PROzac   This may have changed:  Another medication with the same name was added. Make sure you understand how and when to take each.   Used for:  Major depressive disorder, recurrent episode, moderate (H), JUJU (generalized anxiety disorder)   Changed by:  Rossi Ambrose APRN CNP        Dose:  60 mg   Take 3 capsules (60 mg) by mouth daily   Quantity:  90 capsule   Refills:  1       * FLUoxetine 40 MG capsule   Commonly known as:  PROzac   This may have changed:  You were already taking a medication with the " same name, and this prescription was added. Make sure you understand how and when to take each.   Used for:  JUJU (generalized anxiety disorder), Major depressive disorder, recurrent episode, moderate (H)   Changed by:  Rossi Ambrose APRN CNP        Dose:  40 mg   Take 1 capsule (40 mg) by mouth daily   Quantity:  90 capsule   Refills:  1       * Notice:  This list has 2 medication(s) that are the same as other medications prescribed for you. Read the directions carefully, and ask your doctor or other care provider to review them with you.         Where to get your medicines      These medications were sent to [id number] - [place], [place] - [address]  [address], [facility] [address]     Phone:  [phone]     FLUoxetine 40 MG capsule         Some of these will need a paper prescription and others can be bought over the counter.  Ask your nurse if you have questions.     Bring a paper prescription for each of these medications     oxyCODONE-acetaminophen 5-325 MG per tablet                Primary Care Provider Office Phone # Fax #    FRANCESCA Patiño [phone]       [facility] [id number] [place] [id number]        Goals        General    Psychosocial I will devlope a safety plan (pt-stated)     Notes - Note created  11/5/2015  3:13 PM by Magdalena Warren LSW    As of today's date 11/5/2015 goal is met at 0 - 25%.   Goal Status:  Active      Psychosocial/ I will go to talk with Lalita Watters (pt-stated)     Notes - Note created  11/5/2015  3:12 PM by Magdalena Warren LSW    As of today's date 11/5/2015 goal is met at 0 - 25%.   Goal Status:  Active        Equal Access to Services     [facility]: Hadii aad ku hadasho Soomaali, waaxda luqadaha, qaybta kaalmada adeegyada, hira palmer . [facility] [phone].    ATENCIÓN: Si habla español, tiene a meier disposición servicios gratuitos de asistencia lingüística. Llame al  [phone].    We comply with applicable federal civil rights laws and [place] laws. We do not discriminate on the basis of race, color, national origin, age, disability sex, sexual orientation or gender identity.            Thank you!     [facility]  [facility] your [facility]. Our goal is always to provide you with excellent care. Hearing back from our patients is one way we can continue to improve our services. Please take a few minutes to complete the written survey that you may receive in the mail after your visit with us. Thank you!             Your Updated Medication List - Protect others around you: Learn how to safely use, store and throw away your medicines at www.disposemymeds.org.          This list is accurate as of: 8/3/17  7:39 PM.  Always use your most recent med list.                   Brand Name Dispense Instructions for use Diagnosis    clonazePAM 0.5 MG tablet    klonoPIN    10 tablet    Take 0.5-1 tablets (0.25-0.5 mg) by mouth daily as needed for anxiety    JUJU (generalized anxiety disorder)       * FLUoxetine 20 MG capsule    PROzac    90 capsule    Take 3 capsules (60 mg) by mouth daily    Major depressive disorder, recurrent episode, moderate (H), JUJU (generalized anxiety disorder)       * FLUoxetine 40 MG capsule    PROzac    90 capsule    Take 1 capsule (40 mg) by mouth daily    JUJU (generalized anxiety disorder), Major depressive disorder, recurrent episode, moderate (H)       metoclopramide 10 MG tablet    REGLAN    120 tablet    Take 1 tablet (10 mg) by mouth 4 times daily (before meals and nightly)    Crohn's ileitis (H), Gastritis       MIRENA (52 MG) 20 MCG/24HR IUD   Generic drug:  levonorgestrel     1 Intra Uterine Device    intrauterine uterine device placed    Menorrhagia, Well woman exam with routine gynecological exam, Insertion of IUD, Contraception       Multi-vitamin Tabs tablet      Take 1 tablet by mouth daily        oxyCODONE-acetaminophen 5-325  MG per tablet    PERCOCET    10 tablet    Take 1 tablet by mouth every 6 hours as needed    Crohn's ileitis, other complication (H)       predniSONE 5 MG tablet    DELTASONE    81 tablet    4 tbs qd X 1wk, 3 tbs qd X 1wk, 2 tbs qd X 1wk, 1 tb qd X 1wk, then 1tb qod X 1 wk        PROBIOTIC ADVANCED PO           * Notice:  This list has 2 medication(s) that are the same as other medications prescribed for you. Read the directions carefully, and ask your doctor or other care provider to review them with you.

## 2017-08-03 NOTE — TELEPHONE ENCOUNTER
Pt informed. Offered to schedule appt with KL today at 320, but pt does not get off work until 4pm.    Provider, are you willing/able to work in patient tonight after 4:20PM?    Ok to LM on phone with info.

## 2017-08-03 NOTE — TELEPHONE ENCOUNTER
Per KL, verbal ok to schedule pt for work in at 4:40pm today.    Left message asking patient to return call.  please inform pt and help schedule the work in appt today

## 2017-08-03 NOTE — PROGRESS NOTES
SUBJECTIVE:                                                    Soo Timmons is a 37 year old female who presents to clinic today for the following health issues:    ED/UC Followup:    Facility:  [facility]   Date of visit: 7/28/17  Reason for visit: possible flared up crohn   Current Status: improved with pain, bowels are still slow.      She reports that she went to the ER on 07/28/2017 for a Crohn's flare up. She is exploring which provider she wants to go to.     She is taking Prednisone and Percocet. She is taking the Percocet once in the morning, and once at night. She has tried working.     Bowel Movements  She has a bowel movement every 3 days. She uses miralax.     Nutrition  She is trying to get enough fluids during the day. She is taking her multivitamin.     Depression and Anxiety Follow-Up    Status since last visit: anxiety -worsen and depression is better     Other associated symptoms:None    Complicating factors:     Significant life event: No     Current substance abuse: None    Depression and anxiety is controlled with Prozac. She is taking her Prozac regularly.     PHQ-9 SCORE 12/15/2016 5/22/2017 8/3/2017   Total Score - - -   Total Score MyChart - - -   Total Score 12 10 11     JUJU-7 SCORE 12/15/2016 5/22/2017 8/3/2017   Total Score - - -   Total Score - - -   Total Score 7 13 15       PHQ-9  English  PHQ-9   Any Language  GAD7    Sleep  She is sleeping okay, but sometimes she has trouble.    Marriage  She lives with her , but she does not consider it a marriage.         Amount of exercise or physical activity: 2-3 days/week for an average of 15-30 minutes    Problems taking medications regularly: No    Medication side effects: none  Diet: gluten-free/reduced and fodmap     Problem list and histories reviewed & adjusted, as indicated.  Additional history: as documented    Patient Active Problem List   Diagnosis     Migraine with aura     Fatigue     Surveillance of  previously prescribed intrauterine contraceptive device     Anemia     CARDIOVASCULAR SCREENING; LDL GOAL LESS THAN 160     Health Care Home     Anxiety     Colitis     Mild major depression (H)     GERD (gastroesophageal reflux disease)     Crohn's ileitis (H)     Malabsorption     Inflammatory bowel disease (Crohn's disease) (H)     Iron deficiency anemia     Vitamin B 12 deficiency     Irritable bowel syndrome     Past Surgical History:   Procedure Laterality Date     COLONOSCOPY  10/04/06    Luana Bey [facility]      COLONOSCOPY  12/20/2013    Procedure: COMBINED COLONOSCOPY, SINGLE BIOPSY/POLYPECTOMY BY BIOPSY;;  Surgeon: Presley Ocampo MD;  Location: [facility] OR     HC REMOVE INTRAUTERINE DEVICE  09/02/08     HC UGI ENDOSCOPY, SIMPLE EXAM  10/4/2006     LAPAROSCOPIC CHOLECYSTECTOMY  5/16/2011    Procedure:LAPAROSCOPIC CHOLECYSTECTOMY; Surgeon:LIYAH AVELAR; Location:[facility] OR       Social History   Substance Use Topics     Smoking status: Never Smoker     Smokeless tobacco: Never Used     Alcohol use No      Comment: rare     Family History   Problem Relation Age of Onset     Hypertension Mother      Alzheimer Disease Maternal Grandmother      Asthma No family hx of      C.A.D. No family hx of      DIABETES No family hx of      CEREBROVASCULAR DISEASE No family hx of      Breast Cancer No family hx of      Cancer - colorectal No family hx of      Prostate Cancer No family hx of      Alcohol/Drug No family hx of      Allergies No family hx of      Anesthesia Reaction No family hx of      Arthritis No family hx of      Blood Disease No family hx of      CANCER No family hx of      Circulatory No family hx of      Depression No family hx of      Endocrine Disease No family hx of      Eye Disorder No family hx of      Genetic Disorder No family hx of      Gynecology No family hx of      GASTROINTESTINAL DISEASE No family hx of      Genitourinary Problems No family hx of      HEART DISEASE No family hx of      Lipids No  family hx of      Neurologic Disorder No family hx of      Obesity No family hx of      Hearing Loss No family hx of      Respiratory No family hx of      OSTEOPOROSIS No family hx of      Musculoskeletal Disorder No family hx of      Thyroid Disease No family hx of      Psychotic Disorder No family hx of      Cardiovascular No family hx of      Congenital Anomalies No family hx of      Connective Tissue Disorder No family hx of      Coronary Artery Disease No family hx of      Hyperlipidemia No family hx of      Ovarian Cancer No family hx of      Depression/Anxiety No family hx of      Thyroid Disease No family hx of      Chemical Addiction No family hx of      Known Genetic Syndrome No family hx of      Anxiety Disorder No family hx of      MENTAL ILLNESS No family hx of      Substance Abuse No family hx of      Colon Cancer No family hx of      Other Cancer No family hx of          Current Outpatient Prescriptions   Medication Sig Dispense Refill     oxyCODONE-acetaminophen (PERCOCET) 5-325 MG per tablet Take 1 tablet by mouth every 6 hours as needed 10 tablet 0     FLUoxetine (PROZAC) 40 MG capsule Take 1 capsule (40 mg) by mouth daily 90 capsule 1     predniSONE (DELTASONE) 5 MG tablet 4 tbs qd X 1wk, 3 tbs qd X 1wk, 2 tbs qd X 1wk, 1 tb qd X 1wk, then 1tb qod X 1 wk 81 tablet 0     Probiotic Product (PROBIOTIC ADVANCED PO)        FLUoxetine (PROZAC) 20 MG capsule Take 3 capsules (60 mg) by mouth daily 90 capsule 1     multivitamin, therapeutic with minerals (MULTI-VITAMIN) TABS Take 1 tablet by mouth daily       MIRENA 20 MCG/24HR IU IUD intrauterine uterine device placed 1 Intra Uterine Device 0     clonazePAM (KLONOPIN) 0.5 MG tablet Take 0.5-1 tablets (0.25-0.5 mg) by mouth daily as needed for anxiety (Patient not taking: Reported on 8/3/2017) 10 tablet 0     metoclopramide (REGLAN) 10 MG tablet Take 1 tablet (10 mg) by mouth 4 times daily (before meals and nightly) (Patient not taking: Reported on  "5/22/2017) 120 tablet 1         Reviewed and updated as needed this visit by clinical staffTobacco  Allergies  Meds  Problems  Med Hx  Surg Hx  Fam Hx  Soc Hx        Reviewed and updated as needed this visit by Provider  Allergies  Meds  Problems         ROS:  Constitutional, neuro, ENT, endocrine, pulmonary, cardiac, gastrointestinal, genitourinary, musculoskeletal, integument and psychiatric systems are negative, except as otherwise noted.    This document serves as a record of the services and decisions personally performed and made by Rossi Ambrose DNP. It was created on her behalf by Kirstie Hernandez, a trained medical scribe. The creation of this document is based on the provider's statements to the medical scribe.  Kirstie Hernandez 5:54 PM August 3, 2017    OBJECTIVE:                                                    BP 96/66  Pulse 84  Temp 99  F (37.2  C) (Temporal)  Resp 16  Ht 5' 5.55\" (1.665 m)  Wt 127 lb (57.6 kg)  LMP 06/18/2017  BMI 20.78 kg/m2  Body mass index is 20.78 kg/(m^2).  GENERAL APPEARANCE: healthy, alert and no distress  HENT: ear canals and TM's normal and nose and mouth without ulcers or lesions  NECK: no adenopathy, no asymmetry, masses, or scars and thyroid normal to palpation  RESP: lungs clear to auscultation - no rales, rhonchi or wheezes  CV: regular rates and rhythm, normal S1 S2, no S3 or S4 and no murmur, click or rub  ABDOMEN: mild lower abdomen distension, soft, rounded, hyperactive bowel sounds, no guarding or severe tenderness noted today, otherwise soft and nontender, without hepatosplenomegaly or masses and bowel sounds hyperactive  NEURO: Normal strength and tone, mentation intact and speech normal  PSYCH: mentation appears normal and affect normal/bright    Diagnostic test results:  Diagnostic Test Results:  No results found for this or any previous visit (from the past 24 hour(s)).       ASSESSMENT/PLAN:                                                        " ICD-10-CM    1. Crohn's ileitis, other complication (H) K50.018 oxyCODONE-acetaminophen (PERCOCET) 5-325 MG per tablet     GASTROENTEROLOGY ADULT REF CONSULT ONLY   2. JUJU (generalized anxiety disorder) F41.1 FLUoxetine (PROZAC) 40 MG capsule   3. Major depressive disorder, recurrent episode, moderate (H) F33.1 FLUoxetine (PROZAC) 40 MG capsule     Reviewed Crohn's iletis. Has steroid responsive symptoms. Has chronic issue and barrier to follow-upw with financial status.  Continue treatment with Percocet- sparing use, and cannot use during work. Refilled Percocet. Referral to Gastroenterology as requested by patient. Counseled on steroid use.     Anxiety and depression is controlled with Prozac 40mg. Refilled Prozac 40mg.     Encouraged to use Melatonin to help with sleep.     Follow up with Provider - Pending visit with Gastroenterology, otherwise as needed    All questions invited, asked and answered to the patient's apparent satisfaction.  Patient agrees to plan.     The information in this document, created by the medical scribe for me, accurately reflects the services I personally performed and the decisions made by me. I have reviewed and approved this document for accuracy prior to leaving the patient care area.  August 3, 2017 5:54 PM    FRANCESCA Jones [facility]

## 2017-08-03 NOTE — NURSING NOTE
"Chief Complaint   Patient presents with     Medication Request       Initial BP 96/66  Pulse 84  Temp 99  F (37.2  C) (Temporal)  Resp 16  Ht 5' 5.55\" (1.665 m)  Wt 127 lb (57.6 kg)  LMP 06/18/2017  BMI 20.78 kg/m2 Estimated body mass index is 20.78 kg/(m^2) as calculated from the following:    Height as of this encounter: 5' 5.55\" (1.665 m).    Weight as of this encounter: 127 lb (57.6 kg).  Medication Reconciliation: complete     Eduardo Tamayo MA    "

## 2017-08-04 ASSESSMENT — PATIENT HEALTH QUESTIONNAIRE - PHQ9: SUM OF ALL RESPONSES TO PHQ QUESTIONS 1-9: 11

## 2017-08-04 ASSESSMENT — ANXIETY QUESTIONNAIRES: GAD7 TOTAL SCORE: 15

## 2017-08-31 ENCOUNTER — OFFICE VISIT (OUTPATIENT)
Dept: FAMILY MEDICINE | Facility: CLINIC | Age: 37
End: 2017-08-31
Payer: COMMERCIAL

## 2017-08-31 VITALS
TEMPERATURE: 98.3 F | BODY MASS INDEX: 19.73 KG/M2 | RESPIRATION RATE: 16 BRPM | SYSTOLIC BLOOD PRESSURE: 104 MMHG | DIASTOLIC BLOOD PRESSURE: 62 MMHG | WEIGHT: 122.8 LBS | HEART RATE: 72 BPM | HEIGHT: 66 IN

## 2017-08-31 DIAGNOSIS — Z23 NEED FOR MMR VACCINE: ICD-10-CM

## 2017-08-31 DIAGNOSIS — F41.9 ANXIETY: ICD-10-CM

## 2017-08-31 DIAGNOSIS — K50.019 CROHN'S DISEASE OF SMALL INTESTINE WITH COMPLICATION (H): Primary | ICD-10-CM

## 2017-08-31 DIAGNOSIS — Z23 NEED FOR VACCINATION: ICD-10-CM

## 2017-08-31 DIAGNOSIS — Z23 NEED FOR PROPHYLACTIC VACCINATION AND INOCULATION AGAINST INFLUENZA: ICD-10-CM

## 2017-08-31 DIAGNOSIS — F32.0 MILD MAJOR DEPRESSION (H): ICD-10-CM

## 2017-08-31 PROCEDURE — 90686 IIV4 VACC NO PRSV 0.5 ML IM: CPT | Performed by: NURSE PRACTITIONER

## 2017-08-31 PROCEDURE — 90471 IMMUNIZATION ADMIN: CPT | Performed by: NURSE PRACTITIONER

## 2017-08-31 PROCEDURE — 90707 MMR VACCINE SC: CPT | Performed by: NURSE PRACTITIONER

## 2017-08-31 PROCEDURE — 99214 OFFICE O/P EST MOD 30 MIN: CPT | Mod: 25 | Performed by: NURSE PRACTITIONER

## 2017-08-31 PROCEDURE — 90472 IMMUNIZATION ADMIN EACH ADD: CPT | Performed by: NURSE PRACTITIONER

## 2017-08-31 ASSESSMENT — PAIN SCALES - GENERAL: PAINLEVEL: MILD PAIN (3)

## 2017-08-31 NOTE — NURSING NOTE
Prior to injection verified patient identity using patient's name and date of birth.      Screening Questionnaire for Adult Immunization    Are you sick today?   No   Do you have allergies to medications, food, a vaccine component or latex?   Yes   Have you ever had a serious reaction after receiving a vaccination?   No   Do you have a long-term health problem with heart disease, lung disease, asthma, kidney disease, metabolic disease (e.g. diabetes), anemia, or other blood disorder?   No   Do you have cancer, leukemia, HIV/AIDS, or any other immune system problem?   No   In the past 3 months, have you taken medications that affect  your immune system, such as prednisone, other steroids, or anticancer drugs; drugs for the treatment of rheumatoid arthritis, Crohn s disease, or psoriasis; or have you had radiation treatments?   Yes   Have you had a seizure, or a brain or other nervous system problem?   No   During the past year, have you received a transfusion of blood or blood     products, or been given immune (gamma) globulin or antiviral drug?   No   For women: Are you pregnant or is there a chance you could become        pregnant during the next month?   No   Have you received any vaccinations in the past 4 weeks?   No     Immunization questionnaire was positive for at least one answer.  Per luther Ambrose.         Per orders of Rossi Ambrose, injection of mmr given by Eduardo Tamayo. Patient instructed to remain in clinic for 15 minutes afterwards, and to report any adverse reaction to me immediately.       Screening performed by Eduardo Tamayo on 8/31/2017 at 3:02 PM.

## 2017-08-31 NOTE — MR AVS SNAPSHOT
After Visit Summary   8/31/2017    Soo Timmons    MRN: [id number]           Patient Information     Date Of Birth          1980        Visit Information        Provider Department      8/31/2017 1:00 PM Rossi Ambrose, APRN CNP [facility]        Today's Diagnoses     Crohn's disease of small intestine with complication (H)    -  1    Need for MMR vaccine        Need for vaccination        Need for prophylactic vaccination and inoculation against influenza        Mild major depression (H)        Anxiety           Follow-ups after your visit        Who to contact     If you have questions or need follow up information about today's clinic visit or your schedule please contact [facility] directly at [phone].  Normal or non-critical lab and imaging results will be communicated to you by AA Partyhart, letter or phone within 4 business days after the clinic has received the results. If you do not hear from us within 7 days, please contact the clinic through AA Partyhart or phone. If you have a critical or abnormal lab result, we will notify you by phone as soon as possible.  Submit refill requests through PromoJam or call your pharmacy and they will forward the refill request to us. Please allow 3 business days for your refill to be completed.          Additional Information About Your Visit        MyChart Information     PromoJam gives you secure access to your electronic health record. If you see a primary care provider, you can also send messages to your care team and make appointments. If you have questions, please call your primary care clinic.  If you do not have a primary care provider, please call [phone] and they will assist you.        Care EveryWhere ID     This is your Care EveryWhere ID. This could be used by other organizations to access your [place] medical records  [id number]        Your Vitals Were     Pulse Temperature Respirations Height Last Period BMI  "(Body Mass Index)    72 98.3  F (36.8  C) (Temporal) 16 5' 6\" (1.676 m) 06/18/2017 19.82 kg/m2       Blood Pressure from Last 3 Encounters:   08/31/17 104/62   08/03/17 96/66   07/28/17 98/69    Weight from Last 3 Encounters:   08/31/17 122 lb 12.8 oz (55.7 kg)   08/03/17 127 lb (57.6 kg)   07/28/17 122 lb (55.3 kg)              We Performed the Following     1st  Administration  [37290]     FLU VAC, SPLIT VIRUS IM > 3 YO (QUADRIVALENT) [79834]     MMR VIRUS IMMUNIZATION, SUBCUT     VACCINE ADMINISTRATION, EACH ADDITIONAL          Today's Medication Changes          These changes are accurate as of: 8/31/17  5:20 PM.  If you have any questions, ask your nurse or doctor.               These medicines have changed or have updated prescriptions.        Dose/Directions    FLUoxetine 40 MG capsule   Commonly known as:  PROzac   This may have changed:  Another medication with the same name was removed. Continue taking this medication, and follow the directions you see here.   Used for:  JUJU (generalized anxiety disorder), Major depressive disorder, recurrent episode, moderate (H)   Changed by:  Rossi Ambrose APRN CNP        Dose:  40 mg   Take 1 capsule (40 mg) by mouth daily   Quantity:  90 capsule   Refills:  1         Stop taking these medicines if you haven't already. Please contact your care team if you have questions.     metoclopramide 10 MG tablet   Commonly known as:  REGLAN   Stopped by:  Rossi Ambrose APRN CNP                    Primary Care Provider Office Phone # Fax #    FRANCESCA Patiño [phone]       [address] [facility] [address]        Goals        General    Psychosocial I will devlope a safety plan (pt-stated)     Notes - Note created  11/5/2015  3:13 PM by Magdalena Warren LSW    As of today's date 11/5/2015 goal is met at 0 - 25%.   Goal Status:  Active      Psychosocial/ I will go to talk with Lalita Watters (pt-stated)     Notes - Note created  11/5/2015  3:12 " PM by Magdalena Warren LSW    As of today's date 11/5/2015 goal is met at 0 - 25%.   Goal Status:  Active        Equal Access to Services     CODIEJASMINE MANGO : Kathi fidel andrade reina Dunlap, thelma joaquin, laura kaverna peña, hira gamblejose andra. So [facility] [place] [facility] [phone].    ATENCIÓN: Si habla español, tiene a meier disposición servicios gratuitos de asistencia lingüística. Llame al [phone].    We comply with applicable federal civil rights laws and [place] laws. We do not discriminate on the basis of race, color, national origin, age, disability sex, sexual orientation or gender identity.            Thank you!     Thank you for choosing [facility]  for your care. Our goal is always to provide you with excellent care. Hearing back from our patients is one way we can continue to improve our services. Please take a few minutes to complete the written survey that you may receive in the mail after your visit with us. Thank you!             Your Updated Medication List - Protect others around you: Learn how to safely use, store and throw away your medicines at www.disposemymeds.org.          This list is accurate as of: 8/31/17  5:20 PM.  Always use your most recent med list.                   Brand Name Dispense Instructions for use Diagnosis    clonazePAM 0.5 MG tablet    klonoPIN    10 tablet    Take 0.5-1 tablets (0.25-0.5 mg) by mouth daily as needed for anxiety    JUJU (generalized anxiety disorder)       FLUoxetine 40 MG capsule    PROzac    90 capsule    Take 1 capsule (40 mg) by mouth daily    JUJU (generalized anxiety disorder), Major depressive disorder, recurrent episode, moderate (H)       MIRENA (52 MG) 20 MCG/24HR IUD   Generic drug:  levonorgestrel     1 Intra Uterine Device    intrauterine uterine device placed    Menorrhagia, Well woman exam with routine gynecological exam, Insertion of IUD, Contraception       Multi-vitamin Tabs tablet      Take 1 tablet by  mouth daily        oxyCODONE-acetaminophen 5-325 MG per tablet    PERCOCET    10 tablet    Take 1 tablet by mouth every 6 hours as needed    Crohn's ileitis, other complication (H)       PROBIOTIC ADVANCED PO

## 2017-08-31 NOTE — NURSING NOTE
"Chief Complaint   Patient presents with     Panel Management     Forms     FMLA forms       Initial /62  Pulse 72  Temp 98.3  F (36.8  C) (Temporal)  Resp 16  Ht 5' 6\" (1.676 m)  Wt 122 lb 12.8 oz (55.7 kg)  LMP 06/18/2017  BMI 19.82 kg/m2 Estimated body mass index is 19.82 kg/(m^2) as calculated from the following:    Height as of this encounter: 5' 6\" (1.676 m).    Weight as of this encounter: 122 lb 12.8 oz (55.7 kg).  Medication Reconciliation: complete   April LYUDMILA Freeman      "

## 2017-09-15 ENCOUNTER — MYC MEDICAL ADVICE (OUTPATIENT)
Dept: FAMILY MEDICINE | Facility: CLINIC | Age: 37
End: 2017-09-15

## 2017-09-15 DIAGNOSIS — Z02.9 ENCOUNTER FOR ADMINISTRATIVE EXAMINATIONS: Primary | ICD-10-CM

## 2017-09-15 NOTE — TELEPHONE ENCOUNTER
Orders pending. Please review and advise if others need to be ordered or if you want to place future orders for mantoux. Will let pt know when done.    Yahaira Conner CMA (AAMA)

## 2017-09-19 NOTE — TELEPHONE ENCOUNTER
Patient would also like a Hep B titer order as well.  She is having her labs drawn on 09-20-17.  Thank you

## 2017-09-20 DIAGNOSIS — Z02.9 ENCOUNTER FOR ADMINISTRATIVE EXAMINATIONS: ICD-10-CM

## 2017-09-20 PROCEDURE — 86735 MUMPS ANTIBODY: CPT | Performed by: NURSE PRACTITIONER

## 2017-09-20 PROCEDURE — 86480 TB TEST CELL IMMUN MEASURE: CPT | Performed by: NURSE PRACTITIONER

## 2017-09-20 PROCEDURE — 86762 RUBELLA ANTIBODY: CPT | Performed by: NURSE PRACTITIONER

## 2017-09-20 PROCEDURE — 86706 HEP B SURFACE ANTIBODY: CPT | Performed by: NURSE PRACTITIONER

## 2017-09-20 PROCEDURE — 86765 RUBEOLA ANTIBODY: CPT | Performed by: NURSE PRACTITIONER

## 2017-09-22 DIAGNOSIS — Z02.9 ENCOUNTERS FOR ADMINISTRATIVE PURPOSE: Primary | ICD-10-CM

## 2017-09-22 LAB
HBV SURFACE AB SERPL IA-ACNC: 1.04 M[IU]/ML
M TB TUBERC IFN-G BLD QL: ABNORMAL
M TB TUBERC IFN-G/MITOGEN IGNF BLD: ABNORMAL IU/ML
MEV IGG SER QL IA: 2.2 AI (ref 0–0.8)
MUV IGG SER QL IA: 1.2 AI (ref 0–0.8)
RUBV IGG SERPL IA-ACNC: >250 IU/ML

## 2017-09-26 ENCOUNTER — MYC MEDICAL ADVICE (OUTPATIENT)
Dept: FAMILY MEDICINE | Facility: CLINIC | Age: 37
End: 2017-09-26

## 2017-10-03 ENCOUNTER — ALLIED HEALTH/NURSE VISIT (OUTPATIENT)
Dept: FAMILY MEDICINE | Facility: OTHER | Age: 37
End: 2017-10-03
Payer: COMMERCIAL

## 2017-10-03 DIAGNOSIS — Z11.1 SCREENING EXAMINATION FOR PULMONARY TUBERCULOSIS: Primary | ICD-10-CM

## 2017-10-03 PROCEDURE — 86580 TB INTRADERMAL TEST: CPT

## 2017-10-03 PROCEDURE — 99207 ZZC NO CHARGE NURSE ONLY: CPT

## 2017-10-03 NOTE — MR AVS SNAPSHOT
After Visit Summary   10/3/2017    Soo Timmons    MRN: [id number]           Patient Information     Date Of Birth          1980        Visit Information        Provider Department      10/3/2017 3:00 PM NL FLOANNABEL TEAM B, [facility]        Today's Diagnoses     Screening examination for pulmonary tuberculosis    -  1       Follow-ups after your visit        Your next 10 appointments already scheduled     Oct 05, 2017  3:30 PM CDT   Nurse Only with NL RN TEAM A, ERC   [facility] ([facility])    [address]  [place] [id number] [phone]              Who to contact     If you have questions or need follow up information about today's clinic visit or your schedule please contact [facility] directly at [phone].  Normal or non-critical lab and imaging results will be communicated to you by TÃ¡ximohart, letter or phone within 4 business days after the clinic has received the results. If you do not hear from us within 7 days, please contact the clinic through TÃ¡ximohart or phone. If you have a critical or abnormal lab result, we will notify you by phone as soon as possible.  Submit refill requests through Silverpop or call your pharmacy and they will forward the refill request to us. Please allow 3 business days for your refill to be completed.          Additional Information About Your Visit        MyChart Information     Silverpop gives you secure access to your electronic health record. If you see a primary care provider, you can also send messages to your care team and make appointments. If you have questions, please call your primary care clinic.  If you do not have a primary care provider, please call [phone] and they will assist you.        Care EveryWhere ID     This is your Care EveryWhere ID. This could be used by other organizations to access your [facility] medical records  [id number]         Blood  Pressure from Last 3 Encounters:   08/31/17 104/62   08/03/17 96/66   07/28/17 98/69    Weight from Last 3 Encounters:   08/31/17 122 lb 12.8 oz (55.7 kg)   08/03/17 127 lb (57.6 kg)   07/28/17 122 lb (55.3 kg)              We Performed the Following     TB INTRADERMAL TEST        Primary Care Provider Office Phone # Fax #    FRANCESCA Patiño [facility] [phone] [address] [facility] [address]        Goals        General    Psychosocial I will devlope a safety plan (pt-stated)     Notes - Note created  11/5/2015  3:13 PM by Magdalena Warren LSW    As of today's date 11/5/2015 goal is met at 0 - 25%.   Goal Status:  Active      Psychosocial/ I will go to talk with Lalita Watters (pt-stated)     Notes - Note created  11/5/2015  3:12 PM by Magdalena Warren LSW    As of today's date 11/5/2015 goal is met at 0 - 25%.   Goal Status:  Active        Equal Access to Services     [facility] AH: Hadii fidel ku hadasho Soomaali, waaxda luqadaha, qaybta kaalmada adeegyada, hira palmer . So [facility] [phone].    ATENCIÓN: Si habla español, tiene a meier disposición servicios gratuitos de asistencia lingüística. [facility] [phone].    We comply with applicable federal civil rights laws and [place] laws. We do not discriminate on the basis of race, color, national origin, age, disability, sex, sexual orientation, or gender identity.            Thank you!     Thank you for choosing [facility]  for your care. Our goal is always to provide you with excellent care. Hearing back from our patients is one way we can continue to improve our services. Please take a few minutes to complete the written survey that you may receive in the mail after your visit with us. Thank you!             Your Updated Medication List - Protect others around you: Learn how to safely use, store and throw away your medicines at www.[employer].org.          This list is accurate as  of: 10/3/17  3:40 PM.  Always use your most recent med list.                   Brand Name Dispense Instructions for use Diagnosis    clonazePAM 0.5 MG tablet    klonoPIN    10 tablet    Take 0.5-1 tablets (0.25-0.5 mg) by mouth daily as needed for anxiety    JUJU (generalized anxiety disorder)       FLUoxetine 40 MG capsule    PROzac    90 capsule    Take 1 capsule (40 mg) by mouth daily    JUJU (generalized anxiety disorder), Major depressive disorder, recurrent episode, moderate (H)       MIRENA (52 MG) 20 MCG/24HR IUD   Generic drug:  levonorgestrel     1 Intra Uterine Device    intrauterine uterine device placed    Menorrhagia, Well woman exam with routine gynecological exam, Insertion of IUD, Contraception       Multi-vitamin Tabs tablet      Take 1 tablet by mouth daily        oxyCODONE-acetaminophen 5-325 MG per tablet    PERCOCET    10 tablet    Take 1 tablet by mouth every 6 hours as needed    Crohn's ileitis, other complication (H)       PROBIOTIC ADVANCED PO

## 2017-10-03 NOTE — PROGRESS NOTES
SUBJECTIVE:   Soo Timmons is a 37 year old female who presents to clinic today for the following health issues:      Depression and Anxiety Follow-Up    Status since last visit: Worsened     Other associated symptoms:panic attack    Complicating factors:     Significant life event: Yes     Current substance abuse: None    Patient reports that her  went to treatment for alcohol. 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 She is not sure what prompted him to leave for treatment. He left and did not tell her. He did not reply to her texts. He came home a week later. He met up with some friends at a bar and she found out from his friend that he was in treatment. He left again to go back to treatment. They talk about kids, but otherwise they don't talk. Her anxiety has been elevated by this. They do not yell at one another. She is taking her Prozac every day. She expresses that it is going well. Patient had a panic attack that felt like a heart attack where pain went into her shoulders and across her chest. She has taken Xanax and Clonazepam for panic attacks before.     PHQ-9 Score and MyChart F/U Questions 5/22/2017 8/3/2017 10/5/2017   Total Score 10 11 15   Q9: Suicide Ideation Not at all Not at all Not at all   F/U: Thoughts of suicide or self harm? - - -   F/U: Safety concerns for self or others? - - -     JUJU-7 SCORE 5/22/2017 8/3/2017 10/5/2017   Total Score - - -   Total Score - - 13 (moderate anxiety)   Total Score 13 15 13       PHQ-9  English  PHQ-9   Any Language  GAD7  Suicide Assessment Five-step Evaluation and Treatment (SAFE-T)    GI  She is going to Gastroenterology soon. She is vomiting.       Immunizations  She has submitted paperwork for her Hepatitis B medical record.     She got her Flu shot in August. She needs a letter for work stating that the vaccine is to be considered for the 6284-5907 Flu season.           Amount of exercise or physical  activity: 4-5 days/week for an average of 15-30 minutes    Problems taking medications regularly: No    Medication side effects: none  Diet: gluten-free/reduced        Medication Followup of Prozac    Taking Medication as prescribed: yes    Side Effects:  None    Medication Helping Symptoms:  Depression, yes. Anxiety, no                 Problem list and histories reviewed & adjusted, as indicated.  Additional history: as documented    Patient Active Problem List   Diagnosis     Migraine with aura     Fatigue     Surveillance of previously prescribed intrauterine contraceptive device     Anemia     CARDIOVASCULAR SCREENING; LDL GOAL LESS THAN 160     Health Care Home     Anxiety     Colitis     Mild major depression (H)     GERD (gastroesophageal reflux disease)     Crohn's ileitis (H)     Malabsorption     Inflammatory bowel disease (Crohn's disease) (H)     Iron deficiency anemia     Vitamin B 12 deficiency     Irritable bowel syndrome     Past Surgical History:   Procedure Laterality Date     COLONOSCOPY  10/04/06    [facility]      COLONOSCOPY  12/20/2013    Procedure: COMBINED COLONOSCOPY, SINGLE BIOPSY/POLYPECTOMY BY BIOPSY;;  Surgeon: Presley Ocampo MD;  Location:  OR      REMOVE INTRAUTERINE DEVICE  09/02/08     HC UGI ENDOSCOPY, SIMPLE EXAM  10/4/2006     LAPAROSCOPIC CHOLECYSTECTOMY  5/16/2011    Procedure:LAPAROSCOPIC CHOLECYSTECTOMY; Surgeon:LIYAH AVELAR; Location: OR       Social History   Substance Use Topics     Smoking status: Never Smoker     Smokeless tobacco: Never Used     Alcohol use No      Comment: rare     Family History   Problem Relation Age of Onset     Hypertension Mother      Alzheimer Disease Maternal Grandmother      Asthma No family hx of      C.A.D. No family hx of      DIABETES No family hx of      CEREBROVASCULAR DISEASE No family hx of      Breast Cancer No family hx of      Cancer - colorectal No family hx of      Prostate Cancer No family hx of       Alcohol/Drug No family hx of      Allergies No family hx of      Anesthesia Reaction No family hx of      Arthritis No family hx of      Blood Disease No family hx of      CANCER No family hx of      Circulatory No family hx of      Depression No family hx of      Endocrine Disease No family hx of      Eye Disorder No family hx of      Genetic Disorder No family hx of      Gynecology No family hx of      GASTROINTESTINAL DISEASE No family hx of      Genitourinary Problems No family hx of      HEART DISEASE No family hx of      Lipids No family hx of      Neurologic Disorder No family hx of      Obesity No family hx of      Hearing Loss No family hx of      Respiratory No family hx of      OSTEOPOROSIS No family hx of      Musculoskeletal Disorder No family hx of      Thyroid Disease No family hx of      Psychotic Disorder No family hx of      Cardiovascular No family hx of      Congenital Anomalies No family hx of      Connective Tissue Disorder No family hx of      Coronary Artery Disease No family hx of      Hyperlipidemia No family hx of      Ovarian Cancer No family hx of      Depression/Anxiety No family hx of      Thyroid Disease No family hx of      Chemical Addiction No family hx of      Known Genetic Syndrome No family hx of      Anxiety Disorder No family hx of      MENTAL ILLNESS No family hx of      Substance Abuse No family hx of      Colon Cancer No family hx of      Other Cancer No family hx of          Current Outpatient Prescriptions   Medication Sig Dispense Refill     busPIRone (BUSPAR) 5 MG tablet Start at 5 mg twice daily for 3 days, then 7.5 mg (1.5 tabs) twice daily for 3 days, then 10 mg (2 tabs) twice daily for 3 days, then 12.5 mg (2.5 tabs) twice daily for 3 days, then 15 mg (3 tabs) twice daily and stay at that dose 150 tablet 0     hydrOXYzine (ATARAX) 25 MG tablet Take 0.5-1 tablets (12.5-25 mg) by mouth every 6 hours as needed for anxiety 20 tablet 1     FLUoxetine (PROZAC) 40 MG  "capsule Take 1 capsule (40 mg) by mouth daily 90 capsule 1     Probiotic Product (PROBIOTIC ADVANCED PO)        multivitamin, therapeutic with minerals (MULTI-VITAMIN) TABS Take 1 tablet by mouth daily       MIRENA 20 MCG/24HR IU IUD intrauterine uterine device placed 1 Intra Uterine Device 0     oxyCODONE-acetaminophen (PERCOCET) 5-325 MG per tablet Take 1 tablet by mouth every 6 hours as needed (Patient not taking: Reported on 10/5/2017) 10 tablet 0     clonazePAM (KLONOPIN) 0.5 MG tablet Take 0.5-1 tablets (0.25-0.5 mg) by mouth daily as needed for anxiety (Patient not taking: Reported on 8/3/2017) 10 tablet 0         Reviewed and updated as needed this visit by clinical staffTobacco  Allergies  Meds  Med Hx  Surg Hx  Fam Hx  Soc Hx      Reviewed and updated as needed this visit by Provider         ROS:  Constitutional, neuro, ENT, endocrine, pulmonary, cardiac, gastrointestinal, genitourinary, musculoskeletal, integument and psychiatric systems are negative, except as otherwise noted.    This document serves as a record of the services and decisions personally performed and made by Rossi Ambrose DNP. It was created on her behalf by Kirstie Hernandez, a trained medical scribe. The creation of this document is based on the provider's statements to the medical scribe.  Kirstie Hernandez 1:32 PM October 5, 2017    OBJECTIVE:                                                    BP 96/58  Pulse 70  Temp 99.4  F (37.4  C) (Temporal)  Resp 16  Ht 5' 5.75\" (1.67 m)  Wt 124 lb 9.6 oz (56.5 kg)  SpO2 100%  BMI 20.27 kg/m2  Body mass index is 20.27 kg/(m^2).  GENERAL APPEARANCE: healthy, alert and no distress  NEURO: Normal strength and tone, mentation intact and speech normal  PSYCH: mentation appears normal and anxious affect    Diagnostic test results:  Diagnostic Test Results:  Results for orders placed or performed in visit on 10/03/17 (from the past 24 hour(s))   TB INTRADERMAL TEST   Result Value Ref Range    PPD " Induration 0 0 - 5 mm    PPD Redness 0 mm          ASSESSMENT/PLAN:                                                        ICD-10-CM    1. JUJU (generalized anxiety disorder) F41.1 busPIRone (BUSPAR) 5 MG tablet     hydrOXYzine (ATARAX) 25 MG tablet   2. Colitis K52.9    3. Mild major depression (H) F32.0      Discussed anxiety with patient. Her  has shared little to none information regarding his treatment for alcoholism. 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 Will continue treatment with Prozac and will add Buspar 5mg and Atarax 25mg. Medication direction, dosage, and side effects discussed with patient. Patient will follow-up in 1 month.     Has f/u with [facility].     Forms signed.     Offered patient Flu shot, deferred by patient as she already got the Flu shot in August.     Follow up with Provider - Return to clinic in 1 month for medication check    All questions invited, asked and answered to the patient's apparent satisfaction.  Patient agrees to plan.     The information in this document, created by the medical scribe for me, accurately reflects the services I personally performed and the decisions made by me. I have reviewed and approved this document for accuracy prior to leaving the patient care area.  October 5, 2017 1:32 PM    FRANCESCA Jones [facility] WILBERT  Answers for HPI/ROS submitted by the patient on 10/5/2017   JUJU 7 TOTAL SCORE: 13  If you checked off any problems, how difficult have these problems made it for you to do your work, take care of things at home, or get along with other people?: Very difficult  PHQ9 TOTAL SCORE: 15

## 2017-10-03 NOTE — NURSING NOTE
The patient is asked the following questions today and these are her answers:    -Have you had a mantoux administered in the past 30 days?    No  -Have you had a previous positive Mantoux.  No  -Have you received BCG in the past.  No  -Have you had a live vaccine  (MMR, Varicella, OPV, Yellow Fever) in the last 6 weeks.  No  -Have you had and active  viral or bacterial infection in the past 6 weeks.  No  -Have you received corticosteroids or immunosuppressive agents in the past 6 weeks.  No  -Have you been diagnosed with HIV?  No  -Do you have a maglinancy?  No     Prior to injection verified patient identity using patient's name and date of birth.

## 2017-10-05 ENCOUNTER — OFFICE VISIT (OUTPATIENT)
Dept: FAMILY MEDICINE | Facility: CLINIC | Age: 37
End: 2017-10-05
Payer: COMMERCIAL

## 2017-10-05 ENCOUNTER — ALLIED HEALTH/NURSE VISIT (OUTPATIENT)
Dept: FAMILY MEDICINE | Facility: OTHER | Age: 37
End: 2017-10-05

## 2017-10-05 VITALS
HEIGHT: 66 IN | DIASTOLIC BLOOD PRESSURE: 58 MMHG | RESPIRATION RATE: 16 BRPM | WEIGHT: 124.6 LBS | HEART RATE: 70 BPM | SYSTOLIC BLOOD PRESSURE: 96 MMHG | TEMPERATURE: 99.4 F | BODY MASS INDEX: 20.03 KG/M2 | OXYGEN SATURATION: 100 %

## 2017-10-05 DIAGNOSIS — Z11.1 VISIT FOR MANTOUX TEST: Primary | ICD-10-CM

## 2017-10-05 DIAGNOSIS — F41.1 GAD (GENERALIZED ANXIETY DISORDER): Primary | ICD-10-CM

## 2017-10-05 DIAGNOSIS — F32.0 MILD MAJOR DEPRESSION (H): ICD-10-CM

## 2017-10-05 DIAGNOSIS — K52.9 COLITIS: ICD-10-CM

## 2017-10-05 LAB
PPDINDURATION: 0 MM (ref 0–5)
PPDREDNESS: 0 MM

## 2017-10-05 PROCEDURE — 99213 OFFICE O/P EST LOW 20 MIN: CPT | Performed by: NURSE PRACTITIONER

## 2017-10-05 RX ORDER — HYDROXYZINE HYDROCHLORIDE 25 MG/1
12.5-25 TABLET, FILM COATED ORAL EVERY 6 HOURS PRN
Qty: 20 TABLET | Refills: 1 | Status: SHIPPED | OUTPATIENT
Start: 2017-10-05 | End: 2018-02-08

## 2017-10-05 RX ORDER — BUSPIRONE HYDROCHLORIDE 5 MG/1
TABLET ORAL
Qty: 150 TABLET | Refills: 0 | Status: SHIPPED | OUTPATIENT
Start: 2017-10-05 | End: 2019-02-01

## 2017-10-05 ASSESSMENT — ANXIETY QUESTIONNAIRES
GAD7 TOTAL SCORE: 13
5. BEING SO RESTLESS THAT IT IS HARD TO SIT STILL: MORE THAN HALF THE DAYS
6. BECOMING EASILY ANNOYED OR IRRITABLE: SEVERAL DAYS
GAD7 TOTAL SCORE: 13
7. FEELING AFRAID AS IF SOMETHING AWFUL MIGHT HAPPEN: SEVERAL DAYS
4. TROUBLE RELAXING: MORE THAN HALF THE DAYS
3. WORRYING TOO MUCH ABOUT DIFFERENT THINGS: NEARLY EVERY DAY
1. FEELING NERVOUS, ANXIOUS, OR ON EDGE: MORE THAN HALF THE DAYS
2. NOT BEING ABLE TO STOP OR CONTROL WORRYING: MORE THAN HALF THE DAYS
7. FEELING AFRAID AS IF SOMETHING AWFUL MIGHT HAPPEN: SEVERAL DAYS
GAD7 TOTAL SCORE: 13

## 2017-10-05 ASSESSMENT — PATIENT HEALTH QUESTIONNAIRE - PHQ9
SUM OF ALL RESPONSES TO PHQ QUESTIONS 1-9: 15
10. IF YOU CHECKED OFF ANY PROBLEMS, HOW DIFFICULT HAVE THESE PROBLEMS MADE IT FOR YOU TO DO YOUR WORK, TAKE CARE OF THINGS AT HOME, OR GET ALONG WITH OTHER PEOPLE: VERY DIFFICULT
SUM OF ALL RESPONSES TO PHQ QUESTIONS 1-9: 15

## 2017-10-05 ASSESSMENT — PAIN SCALES - GENERAL: PAINLEVEL: MILD PAIN (3)

## 2017-10-05 NOTE — MR AVS SNAPSHOT
After Visit Summary   10/5/2017    Soo Timmons    MRN: [id number]           Patient Information     Date Of Birth          1980        Visit Information        Provider Department      10/5/2017 3:30 PM NL RN TEAM A, MARIA R [facility]        Today's Diagnoses     Visit for Mantoux test    -  1       Follow-ups after your visit        Who to contact     If you have questions or need follow up information about today's clinic visit or your schedule please contact [facility] directly at [phone].  Normal or non-critical lab and imaging results will be communicated to you by [email or web address], letter or phone within 4 business days after the clinic has received the results. If you do not hear from us within 7 days, please contact the clinic through Apt[employer] or phone. If you have a critical or abnormal lab result, we will notify you by phone as soon as possible.  Submit refill requests through [employer] or call your pharmacy and they will forward the refill request to us. Please allow 3 business days for your refill to be completed.          Additional Information About Your Visit        MyChart Information     [employer] gives you secure access to your electronic health record. If you see a primary care provider, you can also send messages to your care team and make appointments. If you have questions, please call your primary care clinic.  If you do not have a primary care provider, please call [phone] and they will assist you.        Care EveryWhere ID     This is your Care EveryWhere ID. This could be used by other organizations to access your [place] medical records  [id number]         Blood Pressure from Last 3 Encounters:   10/05/17 96/58   08/31/17 104/62   08/03/17 96/66    Weight from Last 3 Encounters:   10/05/17 124 lb 9.6 oz (56.5 kg)   08/31/17 122 lb 12.8 oz (55.7 kg)   08/03/17 127 lb (57.6 kg)              Today, you had the following     No orders found  for display         Today's Medication Changes          These changes are accurate as of: 10/5/17  3:59 PM.  If you have any questions, ask your nurse or doctor.               Start taking these medicines.        Dose/Directions    busPIRone 5 MG tablet   Commonly known as:  BUSPAR   Used for:  JUJU (generalized anxiety disorder)   Started by:  Rossi Ambrose APRN CNP        Start at 5 mg twice daily for 3 days, then 7.5 mg (1.5 tabs) twice daily for 3 days, then 10 mg (2 tabs) twice daily for 3 days, then 12.5 mg (2.5 tabs) twice daily for 3 days, then 15 mg (3 tabs) twice daily and stay at that dose   Quantity:  150 tablet   Refills:  0       hydrOXYzine 25 MG tablet   Commonly known as:  ATARAX   Used for:  JUJU (generalized anxiety disorder)   Started by:  Rossi Ambrose APRN CNP        Dose:  12.5-25 mg   Take 0.5-1 tablets (12.5-25 mg) by mouth every 6 hours as needed for anxiety   Quantity:  20 tablet   Refills:  1            Where to get your medicines      These medications were sent to [id number] - [place], [place] - [address] [facility]  [address] [facility] [address]     Phone:  [phone]     hydrOXYzine 25 MG tablet         Some of these will need a paper prescription and others can be bought over the counter.  Ask your nurse if you have questions.     Bring a paper prescription for each of these medications     busPIRone 5 MG tablet                Primary Care Provider Office Phone # Fax #    FRANCESCA Patiño -[phone] [address] [facility] [address]        Goals        General    Psychosocial I will devlope a safety plan (pt-stated)     Notes - Note created  11/5/2015  3:13 PM by Magdalena Warren LSW    As of today's date 11/5/2015 goal is met at 0 - 25%.   Goal Status:  Active      Psychosocial/ I will go to talk with Lalita Watters (pt-stated)     Notes - Note created  11/5/2015  3:12 PM by Magdalena Warren LSW    As of today's date 11/5/2015  goal is met at 0 - 25%.   Goal Status:  Active        Equal Access to Services     DELORES COBIAN : Hadii aad ku reina Sosusan, waalejada luqadaha, qaybta kaclarkda kseniapolly, hira joein hayaajose mccormackchapin sanchez latrinajose andra. So [facility] [phone].    ATENCIÓN: Si habla español, tiene a meier disposición servicios gratuitos de asistencia lingüística. Ll[facility] [phone].    We comply with applicable federal civil rights laws and [place] laws. We do not discriminate on the basis of race, color, national origin, age, disability, sex, sexual orientation, or gender identity.            Thank you!     Thank you for choosing [facility]  for your care. Our goal is always to provide you with excellent care. Hearing back from our patients is one way we can continue to improve our services. Please take a few minutes to complete the written survey that you may receive in the mail after your visit with us. Thank you!             Your Updated Medication List - Protect others around you: Learn how to safely use, store and throw away your medicines at www.disposemymeds.org.          This list is accurate as of: 10/5/17  3:59 PM.  Always use your most recent med list.                   Brand Name Dispense Instructions for use Diagnosis    busPIRone 5 MG tablet    BUSPAR    150 tablet    Start at 5 mg twice daily for 3 days, then 7.5 mg (1.5 tabs) twice daily for 3 days, then 10 mg (2 tabs) twice daily for 3 days, then 12.5 mg (2.5 tabs) twice daily for 3 days, then 15 mg (3 tabs) twice daily and stay at that dose    JUJU (generalized anxiety disorder)       clonazePAM 0.5 MG tablet    klonoPIN    10 tablet    Take 0.5-1 tablets (0.25-0.5 mg) by mouth daily as needed for anxiety    JUJU (generalized anxiety disorder)       FLUoxetine 40 MG capsule    PROzac    90 capsule    Take 1 capsule (40 mg) by mouth daily    JUJU (generalized anxiety disorder), Major depressive disorder, recurrent episode, moderate (H)       hydrOXYzine 25  MG tablet    ATARAX    20 tablet    Take 0.5-1 tablets (12.5-25 mg) by mouth every 6 hours as needed for anxiety    JUJU (generalized anxiety disorder)       MIRENA (52 MG) 20 MCG/24HR IUD   Generic drug:  levonorgestrel     1 Intra Uterine Device    intrauterine uterine device placed    Menorrhagia, Well woman exam with routine gynecological exam, Insertion of IUD, Contraception       Multi-vitamin Tabs tablet      Take 1 tablet by mouth daily        oxyCODONE-acetaminophen 5-325 MG per tablet    PERCOCET    10 tablet    Take 1 tablet by mouth every 6 hours as needed    Crohn's ileitis, other complication (H)       PROBIOTIC ADVANCED PO

## 2017-10-05 NOTE — NURSING NOTE
"Chief Complaint   Patient presents with     Recheck Medication       Initial BP 96/58  Pulse 70  Temp 99.4  F (37.4  C) (Temporal)  Resp 16  Ht 5' 5.75\" (1.67 m)  Wt 124 lb 9.6 oz (56.5 kg)  SpO2 100%  BMI 20.27 kg/m2 Estimated body mass index is 20.27 kg/(m^2) as calculated from the following:    Height as of this encounter: 5' 5.75\" (1.67 m).    Weight as of this encounter: 124 lb 9.6 oz (56.5 kg).  Medication Reconciliation: complete   April LYUDMILA Freeman      "

## 2017-10-05 NOTE — MR AVS SNAPSHOT
After Visit Summary   10/5/2017    Soo Timmons    MRN: [id number]           Patient Information     Date Of Birth          1980        Visit Information        Provider Department      10/5/2017 1:00 PM Rossi Ambrose APRN CNP [facility]        Today's Diagnoses     JUJU (generalized anxiety disorder)    -  1    Colitis        Mild major depression (H)           Follow-ups after your visit        Follow-up notes from your care team     Return in about 1 month (around 11/5/2017) for Medication check.      Who to contact     If you have questions or need follow up information about today's clinic visit or your schedule please contact [facility] directly at [phone].  Normal or non-critical lab and imaging results will be communicated to you by MyChart, letter or phone within 4 business days after the clinic has received the results. If you do not hear from us within 7 days, please contact the clinic through [employer] or phone. If you have a critical or abnormal lab result, we will notify you by phone as soon as possible.  Submit refill requests through [employer] or call your pharmacy and they will forward the refill request to us. Please allow 3 business days for your refill to be completed.          Additional Information About Your Visit        MyChart Information     [employer] gives you secure access to your electronic health record. If you see a primary care provider, you can also send messages to your care team and make appointments. If you have questions, please call your primary care clinic.  If you do not have a primary care provider, please call [phone] and they will assist you.        Care EveryWhere ID     This is your Care EveryWhere ID. This could be used by other organizations to access your [place] medical records  [id number]        Your Vitals Were     Pulse Temperature Respirations Height Pulse Oximetry BMI (Body Mass Index)    70 99.4  F (37.4  " C) (Temporal) 16 5' 5.75\" (1.67 m) 100% 20.27 kg/m2       Blood Pressure from Last 3 Encounters:   10/05/17 96/58   08/31/17 104/62   08/03/17 96/66    Weight from Last 3 Encounters:   10/05/17 124 lb 9.6 oz (56.5 kg)   08/31/17 122 lb 12.8 oz (55.7 kg)   08/03/17 127 lb (57.6 kg)              Today, you had the following     No orders found for display         Today's Medication Changes          These changes are accurate as of: 10/5/17  5:38 PM.  If you have any questions, ask your nurse or doctor.               Start taking these medicines.        Dose/Directions    busPIRone 5 MG tablet   Commonly known as:  BUSPAR   Used for:  JUJU (generalized anxiety disorder)   Started by:  Rossi Ambrose APRN CNP        Start at 5 mg twice daily for 3 days, then 7.5 mg (1.5 tabs) twice daily for 3 days, then 10 mg (2 tabs) twice daily for 3 days, then 12.5 mg (2.5 tabs) twice daily for 3 days, then 15 mg (3 tabs) twice daily and stay at that dose   Quantity:  150 tablet   Refills:  0       hydrOXYzine 25 MG tablet   Commonly known as:  ATARAX   Used for:  JUJU (generalized anxiety disorder)   Started by:  Rossi Ambrose APRN CNP        Dose:  12.5-25 mg   Take 0.5-1 tablets (12.5-25 mg) by mouth every 6 hours as needed for anxiety   Quantity:  20 tablet   Refills:  1            Where to get your medicines      These medications were sent to [id number] - [place], [place] - [address] [address] [facility]  [address] [facility] [address]     Phone:  [phone]     hydrOXYzine 25 MG tablet         Some of these will need a paper prescription and others can be bought over the counter.  Ask your nurse if you have questions.     Bring a paper prescription for each of these medications     busPIRone 5 MG tablet                Primary Care Provider Office Phone # Fax #    FRANCESCA Patiño -[phone] [address] [facility] [address]        Goals        General    Psychosocial I will devlope a safety " plan (pt-stated)     Notes - Note created  11/5/2015  3:13 PM by Magdalena Warren LSW    As of today's date 11/5/2015 goal is met at 0 - 25%.   Goal Status:  Active      Psychosocial/ I will go to talk with Lalita Watters (pt-stated)     Notes - Note created  11/5/2015  3:12 PM by Magdalena Warren LSW    As of today's date 11/5/2015 goal is met at 0 - 25%.   Goal Status:  Active        Equal Access to Services     DELORES COBIAN AH: Hadii aad ku hadasho Soomaali, waaxda luqadaha, qaybta kaalmada adeegyada, waxay idiin hayaan adeeg kharaheather palmer . So [facility] [phone].    ATENCIÓN: Si habla español, tiene a meier disposición servicios gratuitos de asistencia lingüística. Llame al [phone].    We comply with applicable federal civil rights laws and [place] laws. We do not discriminate on the basis of race, color, national origin, age, disability, sex, sexual orientation, or gender identity.            Thank you!     Thank you for choosing [facility]  for your care. Our goal is always to provide you with excellent care. Hearing back from our patients is one way we can continue to improve our services. Please take a few minutes to complete the written survey that you may receive in the mail after your visit with us. Thank you!             Your Updated Medication List - Protect others around you: Learn how to safely use, store and throw away your medicines at www.disposemymeds.org.          This list is accurate as of: 10/5/17  5:38 PM.  Always use your most recent med list.                   Brand Name Dispense Instructions for use Diagnosis    busPIRone 5 MG tablet    BUSPAR    150 tablet    Start at 5 mg twice daily for 3 days, then 7.5 mg (1.5 tabs) twice daily for 3 days, then 10 mg (2 tabs) twice daily for 3 days, then 12.5 mg (2.5 tabs) twice daily for 3 days, then 15 mg (3 tabs) twice daily and stay at that dose    JUJU (generalized anxiety disorder)       clonazePAM 0.5 MG tablet     klonoPIN    10 tablet    Take 0.5-1 tablets (0.25-0.5 mg) by mouth daily as needed for anxiety    JJUU (generalized anxiety disorder)       FLUoxetine 40 MG capsule    PROzac    90 capsule    Take 1 capsule (40 mg) by mouth daily    JUJU (generalized anxiety disorder), Major depressive disorder, recurrent episode, moderate (H)       hydrOXYzine 25 MG tablet    ATARAX    20 tablet    Take 0.5-1 tablets (12.5-25 mg) by mouth every 6 hours as needed for anxiety    JUJU (generalized anxiety disorder)       MIRENA (52 MG) 20 MCG/24HR IUD   Generic drug:  levonorgestrel     1 Intra Uterine Device    intrauterine uterine device placed    Menorrhagia, Well woman exam with routine gynecological exam, Insertion of IUD, Contraception       Multi-vitamin Tabs tablet      Take 1 tablet by mouth daily        oxyCODONE-acetaminophen 5-325 MG per tablet    PERCOCET    10 tablet    Take 1 tablet by mouth every 6 hours as needed    Crohn's ileitis, other complication (H)       PROBIOTIC ADVANCED PO

## 2017-10-05 NOTE — NURSING NOTE
Soo Timmons is here for Mantoux read.  Mantoux was place on 10/3/17 at 3:36 pm time on left forearm.    00mm induration  00mm redness    Rafaela Knox RN

## 2017-10-05 NOTE — LETTER
October 5, 2017      Soo NOONAN Shanelle  [address] [facility] [address]        To Whom It May Concern,      Soo received the flu vaccine on 8/31/17. This vaccine is valid and intended for the 2017-18 flu season. No further vaccine for influenza is recommended for this season.           Sincerely,        FRANCESCA Jones CNP

## 2017-10-06 ASSESSMENT — ANXIETY QUESTIONNAIRES: GAD7 TOTAL SCORE: 13

## 2017-10-06 ASSESSMENT — PATIENT HEALTH QUESTIONNAIRE - PHQ9: SUM OF ALL RESPONSES TO PHQ QUESTIONS 1-9: 15

## 2017-10-16 ENCOUNTER — TRANSFERRED RECORDS (OUTPATIENT)
Dept: HEALTH INFORMATION MANAGEMENT | Facility: CLINIC | Age: 37
End: 2017-10-16

## 2017-11-16 ENCOUNTER — TRANSFERRED RECORDS (OUTPATIENT)
Dept: HEALTH INFORMATION MANAGEMENT | Facility: CLINIC | Age: 37
End: 2017-11-16

## 2017-12-01 ENCOUNTER — TRANSFERRED RECORDS (OUTPATIENT)
Dept: HEALTH INFORMATION MANAGEMENT | Facility: CLINIC | Age: 37
End: 2017-12-01

## 2017-12-07 ENCOUNTER — OFFICE VISIT (OUTPATIENT)
Dept: FAMILY MEDICINE | Facility: CLINIC | Age: 37
End: 2017-12-07
Payer: COMMERCIAL

## 2017-12-07 ENCOUNTER — MYC MEDICAL ADVICE (OUTPATIENT)
Dept: FAMILY MEDICINE | Facility: CLINIC | Age: 37
End: 2017-12-07

## 2017-12-07 VITALS
BODY MASS INDEX: 19.13 KG/M2 | TEMPERATURE: 98.2 F | HEIGHT: 66 IN | WEIGHT: 119 LBS | SYSTOLIC BLOOD PRESSURE: 100 MMHG | OXYGEN SATURATION: 99 % | DIASTOLIC BLOOD PRESSURE: 60 MMHG | HEART RATE: 80 BPM

## 2017-12-07 DIAGNOSIS — F32.0 MILD MAJOR DEPRESSION (H): ICD-10-CM

## 2017-12-07 DIAGNOSIS — H10.33 ACUTE BACTERIAL CONJUNCTIVITIS OF BOTH EYES: ICD-10-CM

## 2017-12-07 DIAGNOSIS — R05.9 COUGH: ICD-10-CM

## 2017-12-07 DIAGNOSIS — F41.1 GAD (GENERALIZED ANXIETY DISORDER): ICD-10-CM

## 2017-12-07 DIAGNOSIS — F33.1 MAJOR DEPRESSIVE DISORDER, RECURRENT EPISODE, MODERATE (H): ICD-10-CM

## 2017-12-07 DIAGNOSIS — J01.00 ACUTE MAXILLARY SINUSITIS, RECURRENCE NOT SPECIFIED: Primary | ICD-10-CM

## 2017-12-07 PROCEDURE — 99214 OFFICE O/P EST MOD 30 MIN: CPT | Performed by: NURSE PRACTITIONER

## 2017-12-07 RX ORDER — TOBRAMYCIN 3 MG/ML
1 SOLUTION/ DROPS OPHTHALMIC EVERY 4 HOURS
Qty: 1 BOTTLE | Refills: 0 | Status: SHIPPED | OUTPATIENT
Start: 2017-12-07 | End: 2017-12-14

## 2017-12-07 RX ORDER — FLUOXETINE 40 MG/1
40 CAPSULE ORAL DAILY
Qty: 90 CAPSULE | Refills: 1 | Status: SHIPPED | OUTPATIENT
Start: 2017-12-07 | End: 2018-02-08

## 2017-12-07 RX ORDER — AZITHROMYCIN 250 MG/1
TABLET, FILM COATED ORAL
Qty: 6 TABLET | Refills: 0 | Status: SHIPPED | OUTPATIENT
Start: 2017-12-07 | End: 2018-01-03

## 2017-12-07 RX ORDER — BENZONATATE 200 MG/1
200 CAPSULE ORAL 3 TIMES DAILY PRN
Qty: 21 CAPSULE | Refills: 0 | Status: SHIPPED | OUTPATIENT
Start: 2017-12-07 | End: 2018-01-03

## 2017-12-07 ASSESSMENT — ANXIETY QUESTIONNAIRES
5. BEING SO RESTLESS THAT IT IS HARD TO SIT STILL: NOT AT ALL
4. TROUBLE RELAXING: SEVERAL DAYS
3. WORRYING TOO MUCH ABOUT DIFFERENT THINGS: SEVERAL DAYS
6. BECOMING EASILY ANNOYED OR IRRITABLE: SEVERAL DAYS
GAD7 TOTAL SCORE: 4
GAD7 TOTAL SCORE: 4
7. FEELING AFRAID AS IF SOMETHING AWFUL MIGHT HAPPEN: NOT AT ALL
1. FEELING NERVOUS, ANXIOUS, OR ON EDGE: NOT AT ALL
7. FEELING AFRAID AS IF SOMETHING AWFUL MIGHT HAPPEN: NOT AT ALL
2. NOT BEING ABLE TO STOP OR CONTROL WORRYING: SEVERAL DAYS
GAD7 TOTAL SCORE: 4

## 2017-12-07 ASSESSMENT — PATIENT HEALTH QUESTIONNAIRE - PHQ9
SUM OF ALL RESPONSES TO PHQ QUESTIONS 1-9: 8
10. IF YOU CHECKED OFF ANY PROBLEMS, HOW DIFFICULT HAVE THESE PROBLEMS MADE IT FOR YOU TO DO YOUR WORK, TAKE CARE OF THINGS AT HOME, OR GET ALONG WITH OTHER PEOPLE: VERY DIFFICULT
SUM OF ALL RESPONSES TO PHQ QUESTIONS 1-9: 8

## 2017-12-07 ASSESSMENT — PAIN SCALES - GENERAL: PAINLEVEL: MODERATE PAIN (5)

## 2017-12-07 NOTE — LETTER
My Depression Action Plan  Name: Soo Timmons   Date of Birth 1980  Date: 12/7/2017    My doctor: Rossi Ambrose   My clinic: [facility]  [id number] [place] [address], [address]  [place] [id number] [phone]          GREEN    ZONE   Good Control    What it looks like:     Things are going generally well. You have normal up s and down s. You may even feel depressed from time to time, but bad moods usually last less than a day.   What you need to do:  1. Continue to care for yourself (see self care plan)  2. Check your depression survival kit and update it as needed  3. Follow your physician s recommendations including any medication.  4. Do not stop taking medication unless you consult with your physician first.           YELLOW         ZONE Getting Worse    What it looks like:     Depression is starting to interfere with your life.     It may be hard to get out of bed; you may be starting to isolate yourself from others.    Symptoms of depression are starting to last most all day and this has happened for several days.     You may have suicidal thoughts but they are not constant.   What you need to do:     1. Call your care team, your response to treatment will improve if you keep your care team informed of your progress. Yellow periods are signs an adjustment may need to be made.     2. Continue your self-care, even if you have to fake it!    3. Talk to someone in your support network    4. Open up your depression survival kit           RED    ZONE Medical Alert - Get Help    What it looks like:     Depression is seriously interfering with your life.     You may experience these or other symptoms: You can t get out of bed most days, can t work or engage in other necessary activities, you have trouble taking care of basic hygiene, or basic responsibilities, thoughts of suicide or death that will not go away, self-injurious behavior.     What you need to do:  1. Call your care team and  request a same-day appointment. If they are not available (weekends or after hours) call your local crisis line, emergency room or 911.      Electronically signed by: Rossi Ambrose, December 7, 2017    Depression Self Care Plan / Survival Kit    Self-Care for Depression  Here s the deal. Your body and mind are really not as separate as most people think.  What you do and think affects how you feel and how you feel influences what you do and think. This means if you do things that people who feel good do, it will help you feel better.  Sometimes this is all it takes.  There is also a place for medication and therapy depending on how severe your depression is, so be sure to consult with your medical provider and/ or Behavioral Health Consultant if your symptoms are worsening or not improving.     In order to better manage my stress, I will:    Exercise  Get some form of exercise, every day. This will help reduce pain and release endorphins, the  feel good  chemicals in your brain. This is almost as good as taking antidepressants!  This is not the same as joining a gym and then never going! (they count on that by the way ) It can be as simple as just going for a walk or doing some gardening, anything that will get you moving.      Hygiene   Maintain good hygiene (Get out of bed in the morning, Make your bed, Brush your teeth, Take a shower, and Get dressed like you were going to work, even if you are unemployed).  If your clothes don't fit try to get ones that do.    Diet  I will strive to eat foods that are good for me, drink plenty of water, and avoid excessive sugar, caffeine, alcohol, and other mood-altering substances.  Some foods that are helpful in depression are: complex carbohydrates, B vitamins, flaxseed, fish or fish oil, fresh fruits and vegetables.    Psychotherapy  I agree to participate in Individual Therapy (if recommended).    Medication  If prescribed medications, I agree to take them.  Missing doses  can result in serious side effects.  I understand that drinking alcohol, or other illicit drug use, may cause potential side effects.  I will not stop my medication abruptly without first discussing it with my provider.    Staying Connected With Others  I will stay in touch with my friends, family members, and my primary care provider/team.    Use your imagination  Be creative.  We all have a creative side; it doesn t matter if it s oil painting, sand castles, or mud pies! This will also kick up the endorphins.    Witness Beauty  (AKA stop and smell the roses) Take a look outside, even in mid-winter. Notice colors, textures. Watch the squirrels and birds.     Service to others  Be of service to others.  There is always someone else in need.  By helping others we can  get out of ourselves  and remember the really important things.  This also provides opportunities for practicing all the other parts of the program.    Humor  Laugh and be silly!  Adjust your TV habits for less news and crime-drama and more comedy.    Control your stress  Try breathing deep, massage therapy, biofeedback, and meditation. Find time to relax each day.     My support system    Clinic Contact:  Phone number:    Contact 1:  Phone number:    Contact 2:  Phone number:    Mormonism/:  Phone number:    Therapist:  Phone number:    Local crisis center:    Phone number:    Other community support:  Phone number:

## 2017-12-07 NOTE — PROGRESS NOTES
SUBJECTIVE:                                                    Soo Timmons is a 37 year old female who presents to clinic today for the following health issues:      History of Present Illness     Diet:  Gluten-free/reduced  Frequency of exercise:  2-3 days/week  Duration of exercise:  15-30 minutes  Taking medications regularly:  Yes  Medication side effects:  None  Additional concerns today:  No      Acute Illness   Acute illness concerns: cough, pink eye   Onset: 1 weeks     Fever: YES- had 102.4    Chills/Sweats: YES    Headache (location?): YES    Sinus Pressure:no    Conjunctivitis:  YES- bilateral     Ear Pain: no    Rhinorrhea: no     Congestion: YES    Sore Throat: YES     Cough: YES-productive of green sputum, SOB, dizziness     Wheeze: no     Decreased Appetite: YES    Nausea: YES    Vomiting: YES    Diarrhea: no     Dysuria/Freq.: no     Fatigue/Achiness: YES    Sick/Strep Exposure: no      Therapies Tried and outcome: essential oil, humidified, tea, nyquil, soup        Patient reports fever, cough, and weight loss for over a week. She has only been eating soups. Additionally, her eyes are red with discharge.   Wt Readings from Last 3 Encounters:   12/07/17 54 kg (119 lb)   10/05/17 56.5 kg (124 lb 9.6 oz)   08/31/17 55.7 kg (122 lb 12.8 oz)       Patient relates her mood is stable. Her and  have decided to work on their marriage after he returned from treatment. They are living together but are sleeping in separate rooms. She is tolerating prozac.     Problem list and histories reviewed & adjusted, as indicated.  Additional history: as documented    Patient Active Problem List   Diagnosis     Migraine with aura     Fatigue     Surveillance of previously prescribed intrauterine contraceptive device     Anemia     CARDIOVASCULAR SCREENING; LDL GOAL LESS THAN 160     Health Care Home     Anxiety     Colitis     Mild major depression (H)     GERD (gastroesophageal reflux disease)     Crohn's  ileitis (H)     Malabsorption     Inflammatory bowel disease (Crohn's disease) (H)     Iron deficiency anemia     Vitamin B 12 deficiency     Irritable bowel syndrome     Past Surgical History:   Procedure Laterality Date     COLONOSCOPY  10/04/06    Luana Bey [facility]      COLONOSCOPY  12/20/2013    Procedure: COMBINED COLONOSCOPY, SINGLE BIOPSY/POLYPECTOMY BY BIOPSY;;  Surgeon: Presley Ocampo MD;  Location: [facility] OR     HC REMOVE INTRAUTERINE DEVICE  09/02/08     HC UGI ENDOSCOPY, SIMPLE EXAM  10/4/2006     LAPAROSCOPIC CHOLECYSTECTOMY  5/16/2011    Procedure:LAPAROSCOPIC CHOLECYSTECTOMY; Surgeon:LIYAH AVELAR; Location:[facility]       Social History   Substance Use Topics     Smoking status: Never Smoker     Smokeless tobacco: Never Used     Alcohol use No      Comment: rare     Family History   Problem Relation Age of Onset     Hypertension Mother      Alzheimer Disease Maternal Grandmother      Asthma No family hx of      C.A.D. No family hx of      DIABETES No family hx of      CEREBROVASCULAR DISEASE No family hx of      Breast Cancer No family hx of      Cancer - colorectal No family hx of      Prostate Cancer No family hx of      Alcohol/Drug No family hx of      Allergies No family hx of      Anesthesia Reaction No family hx of      Arthritis No family hx of      Blood Disease No family hx of      CANCER No family hx of      Circulatory No family hx of      Depression No family hx of      Endocrine Disease No family hx of      Eye Disorder No family hx of      Genetic Disorder No family hx of      Gynecology No family hx of      GASTROINTESTINAL DISEASE No family hx of      Genitourinary Problems No family hx of      HEART DISEASE No family hx of      Lipids No family hx of      Neurologic Disorder No family hx of      Obesity No family hx of      Hearing Loss No family hx of      Respiratory No family hx of      OSTEOPOROSIS No family hx of      Musculoskeletal Disorder No family hx of      Thyroid Disease  No family hx of      Psychotic Disorder No family hx of      Cardiovascular No family hx of      Congenital Anomalies No family hx of      Connective Tissue Disorder No family hx of      Coronary Artery Disease No family hx of      Hyperlipidemia No family hx of      Ovarian Cancer No family hx of      Depression/Anxiety No family hx of      Thyroid Disease No family hx of      Chemical Addiction No family hx of      Known Genetic Syndrome No family hx of      Anxiety Disorder No family hx of      MENTAL ILLNESS No family hx of      Substance Abuse No family hx of      Colon Cancer No family hx of      Other Cancer No family hx of          Current Outpatient Prescriptions   Medication Sig Dispense Refill     FLUoxetine (PROZAC) 40 MG capsule Take 1 capsule (40 mg) by mouth daily 90 capsule 1     benzonatate (TESSALON) 200 MG capsule Take 1 capsule (200 mg) by mouth 3 times daily as needed for cough 21 capsule 0     azithromycin (ZITHROMAX) 250 MG tablet 2 tablets daily on day one, then one tablet po daily until gone. 6 tablet 0     tobramycin (TOBREX) 0.3 % ophthalmic solution Apply 1 drop to eye every 4 hours for 7 days 1 Bottle 0     busPIRone (BUSPAR) 5 MG tablet Start at 5 mg twice daily for 3 days, then 7.5 mg (1.5 tabs) twice daily for 3 days, then 10 mg (2 tabs) twice daily for 3 days, then 12.5 mg (2.5 tabs) twice daily for 3 days, then 15 mg (3 tabs) twice daily and stay at that dose 150 tablet 0     Probiotic Product (PROBIOTIC ADVANCED PO)        multivitamin, therapeutic with minerals (MULTI-VITAMIN) TABS Take 1 tablet by mouth daily       MIRENA 20 MCG/24HR IU IUD intrauterine uterine device placed 1 Intra Uterine Device 0     hydrOXYzine (ATARAX) 25 MG tablet Take 0.5-1 tablets (12.5-25 mg) by mouth every 6 hours as needed for anxiety (Patient not taking: Reported on 12/7/2017) 20 tablet 1     oxyCODONE-acetaminophen (PERCOCET) 5-325 MG per tablet Take 1 tablet by mouth every 6 hours as needed (Patient  "not taking: Reported on 10/5/2017) 10 tablet 0     [DISCONTINUED] FLUoxetine (PROZAC) 40 MG capsule Take 1 capsule (40 mg) by mouth daily 90 capsule 1     clonazePAM (KLONOPIN) 0.5 MG tablet Take 0.5-1 tablets (0.25-0.5 mg) by mouth daily as needed for anxiety (Patient not taking: Reported on 8/3/2017) 10 tablet 0     Allergies   Allergen Reactions     Banana      Anaphylaxis     Latex      Anaphylaxis     Prilosec [Omeprazole Magnesium] Diarrhea         ROS:  Constitutional, neuro, ENT, endocrine, pulmonary, cardiac, gastrointestinal, genitourinary, musculoskeletal, integument and psychiatric systems are negative, except as otherwise noted.    This document serves as a record of the services and decisions personally performed and made by Rossi Ambrose DNP. It was created on her behalf by Paulette Edge, a trained medical scribe. The creation of this document is based on the provider's statements to the medical scribe.  Paulette Edge 3:39 PM December 7, 2017    OBJECTIVE:                                                    /60  Pulse 80  Temp 98.2  F (36.8  C) (Oral)  Ht 1.665 m (5' 5.55\")  Wt 54 kg (119 lb)  LMP 11/29/2017  SpO2 99%  BMI 19.47 kg/m2  Body mass index is 19.47 kg/(m^2).  GENERAL APPEARANCE: healthy, alert and no distress  EYES: Eyes grossly normal to inspection and sclerae normal, bilateral conjunctival injection, no drainage   HENT: ear canals and TM's normal, nose and mouth without ulcers or lesions and nasal mucosa edematous with moderate rhinorrhea  NECK: no adenopathy, no asymmetry, masses, or scars and thyroid normal to palpation  RESP: lungs clear to auscultation - no rales, rhonchi or wheezes  CV: regular rates and rhythm, normal S1 S2, no S3 or S4 and no murmur, click or rub   ABDOMEN: soft, diffuse tenderness, without hepatosplenomegaly or masses and bowel sounds normal   NEURO: Normal strength and tone, mentation intact and speech normal  PSYCH: mentation appears normal " and affect normal/bright    Diagnostic Test Results:  none        ASSESSMENT/PLAN:                                                        ICD-10-CM    1. Mild major depression (H) F32.0    2. Acute maxillary sinusitis, recurrence not specified J01.00 azithromycin (ZITHROMAX) 250 MG tablet   3. JUJU (generalized anxiety disorder) F41.1 FLUoxetine (PROZAC) 40 MG capsule   4. Major depressive disorder, recurrent episode, moderate (H) F33.1 FLUoxetine (PROZAC) 40 MG capsule   5. Cough R05 benzonatate (TESSALON) 200 MG capsule   6. Acute bacterial conjunctivitis of both eyes H10.33 tobramycin (TOBREX) 0.3 % ophthalmic solution       Discussed cough, fever and related symptoms with patient. Discussed treatment options including steroids, cough suppressants and antibiotic. Holding off on steroids as patient has had a fever, difficult etiology with steroid use and Crohn's. Starting cough suppressants and antibiotic. Order placed for benzonatate and azithromycin. Medication direction, dosage, and side effects discussed with patient. Work note provided for patient.   Monitor weight loss, is continuing to see GI.     Bilateral conjunctival injection noted on exam consistent with conjunctivitis. Starting antibiotic eye drops. Order placed for tobramycin. Medication direction, dosage, and side effects discussed with patient.    Depression/anxiety are stable. Refill provided for fluoxetine.     Follow up with Provider - Return to clinic with any new or worsening symptoms, and as needed.     The information in this document, created by the medical scribe for me, accurately reflects the services I personally performed and the decisions made by me. I have reviewed and approved this document for accuracy prior to leaving the patient care area.  December 7, 2017 3:51 PM    FRANCESCA Jones [facility]

## 2017-12-07 NOTE — MR AVS SNAPSHOT
After Visit Summary   12/7/2017    Soo Timmons    MRN: [id number]           Patient Information     Date Of Birth          1980        Visit Information        Provider Department      12/7/2017 3:20 PM Rossi Ambrose, APRN CNP [facility]        Today's Diagnoses     Acute maxillary sinusitis, recurrence not specified    -  1    Mild major depression (H)        JUJU (generalized anxiety disorder)        Major depressive disorder, recurrent episode, moderate (H)        Cough        Acute bacterial conjunctivitis of both eyes           Follow-ups after your visit        Who to contact     If you have questions or need follow up information about today's clinic visit or your schedule please [facility] directly at [phone].  Normal or non-critical lab and imaging results will be communicated to you by Polantishart, letter or phone within 4 business days after the clinic has received the results. If you do not hear from us within 7 days, please contact the clinic through Polantishart or phone. If you have a critical or abnormal lab result, we will notify you by phone as soon as possible.  Submit refill requests through [email or web address] or call your pharmacy and they will forward the refill request to us. Please allow 3 business days for your refill to be completed.          Additional Information About Your Visit        MyChart Information     [email or web address] gives you secure access to your electronic health record. If you see a primary care provider, you can also send messages to your care team and make appointments. If you have questions, please call your primary care clinic.  If you do not have a primary care provider, please call [phone] and they will assist you.        Care EveryWhere ID     This is your Care EveryWhere ID. This could be used by other organizations to access your [facility] [place] medical records  [id number]        Your Vitals Were     Pulse Temperature Height Last  "Period Pulse Oximetry BMI (Body Mass Index)    80 98.2  F (36.8  C) (Oral) 5' 5.55\" (1.665 m) 11/29/2017 99% 19.47 kg/m2       Blood Pressure from Last 3 Encounters:   12/07/17 100/60   10/05/17 96/58   08/31/17 104/62    Weight from Last 3 Encounters:   12/07/17 119 lb (54 kg)   10/05/17 124 lb 9.6 oz (56.5 kg)   08/31/17 122 lb 12.8 oz (55.7 kg)              We Performed the Following     DEPRESSION ACTION PLAN (DAP)          Today's Medication Changes          These changes are accurate as of: 12/7/17  4:47 PM.  If you have any questions, ask your nurse or doctor.               Start taking these medicines.        Dose/Directions    azithromycin 250 MG tablet   Commonly known as:  ZITHROMAX   Used for:  Acute maxillary sinusitis, recurrence not specified   Started by:  Rossi Ambrose APRN CNP        2 tablets daily on day one, then one tablet po daily until gone.   Quantity:  6 tablet   Refills:  0       benzonatate 200 MG capsule   Commonly known as:  TESSALON   Used for:  Cough   Started by:  Rossi Ambrose APRN CNP        Dose:  200 mg   Take 1 capsule (200 mg) by mouth 3 times daily as needed for cough   Quantity:  21 capsule   Refills:  0       tobramycin 0.3 % ophthalmic solution   Commonly known as:  TOBREX   Used for:  Acute bacterial conjunctivitis of both eyes   Started by:  Rossi Ambrose APRN CNP        Dose:  1 drop   Apply 1 drop to eye every 4 hours for 7 days   Quantity:  1 Bottle   Refills:  0            Where to get your medicines      These medications were sent to [id number] - [place], [place] - [address] [address]  [address] [address] [address]     Phone:  [phone]     azithromycin 250 MG tablet    benzonatate 200 MG capsule    FLUoxetine 40 MG capsule    tobramycin 0.3 % ophthalmic solution                Primary Care Provider Office Phone # Fax #    FRANCESCA Patiño [phone] [address] [address] [facility] [address]        Goals        General    " Psychosocial I will devlope a safety plan (pt-stated)     Notes - Note created  11/5/2015  3:13 PM by Magdalena Warren LSW    As of today's date 11/5/2015 goal is met at 0 - 25%.   Goal Status:  Active      Psychosocial/ I will go to talk with Lalita Watters (pt-stated)     Notes - Note created  11/5/2015  3:12 PM by Magdalena Warren LSW    As of today's date 11/5/2015 goal is met at 0 - 25%.   Goal Status:  Active        Equal Access to Services     [facility]: Hadii aad ku hadasho Soomaali, waaxda luqadaha, qaybta kaalmada adeegyada, waxay joein hayale adechapin palmer . So [facility] [phone].    ATENCIÓN: Si habla español, tiene a meier disposición servicios gratuitos de asistencia lingüística. [facility] [phone].    We comply with applicable federal civil rights laws and [place] laws. We do not discriminate on the basis of race, color, national origin, age, disability, sex, sexual orientation, or gender identity.            Thank you!     Thank you for choosing [facility]  for your care. Our goal is always to provide you with excellent care. Hearing back from our patients is one way we can continue to improve our services. Please take a few minutes to complete the written survey that you may receive in the mail after your visit with us. Thank you!             Your Updated Medication List - Protect others around you: Learn how to safely use, store and throw away your medicines at www.disposemymeds.org.          This list is accurate as of: 12/7/17  4:47 PM.  Always use your most recent med list.                   Brand Name Dispense Instructions for use Diagnosis    azithromycin 250 MG tablet    ZITHROMAX    6 tablet    2 tablets daily on day one, then one tablet po daily until gone.    Acute maxillary sinusitis, recurrence not specified       benzonatate 200 MG capsule    TESSALON    21 capsule    Take 1 capsule (200 mg) by mouth 3 times daily as needed for cough    Cough        busPIRone 5 MG tablet    BUSPAR    150 tablet    Start at 5 mg twice daily for 3 days, then 7.5 mg (1.5 tabs) twice daily for 3 days, then 10 mg (2 tabs) twice daily for 3 days, then 12.5 mg (2.5 tabs) twice daily for 3 days, then 15 mg (3 tabs) twice daily and stay at that dose    JUJU (generalized anxiety disorder)       clonazePAM 0.5 MG tablet    klonoPIN    10 tablet    Take 0.5-1 tablets (0.25-0.5 mg) by mouth daily as needed for anxiety    JUJU (generalized anxiety disorder)       FLUoxetine 40 MG capsule    PROzac    90 capsule    Take 1 capsule (40 mg) by mouth daily    JUJU (generalized anxiety disorder), Major depressive disorder, recurrent episode, moderate (H)       hydrOXYzine 25 MG tablet    ATARAX    20 tablet    Take 0.5-1 tablets (12.5-25 mg) by mouth every 6 hours as needed for anxiety    JUJU (generalized anxiety disorder)       MIRENA (52 MG) 20 MCG/24HR IUD   Generic drug:  levonorgestrel     1 Intra Uterine Device    intrauterine uterine device placed    Menorrhagia, Well woman exam with routine gynecological exam, Insertion of IUD, Contraception       Multi-vitamin Tabs tablet      Take 1 tablet by mouth daily        oxyCODONE-acetaminophen 5-325 MG per tablet    PERCOCET    10 tablet    Take 1 tablet by mouth every 6 hours as needed    Crohn's ileitis, other complication (H)       PROBIOTIC ADVANCED PO           tobramycin 0.3 % ophthalmic solution    TOBREX    1 Bottle    Apply 1 drop to eye every 4 hours for 7 days    Acute bacterial conjunctivitis of both eyes

## 2017-12-07 NOTE — LETTER
December 7, 2017      Soo NOONAN Timmons  [address] [address]        To Whom It May Concern,      Soo was seen today due to illness and can return to work.           Sincerely,        FRANCESCA Jones CNP

## 2017-12-07 NOTE — NURSING NOTE
"Chief Complaint   Patient presents with     Weight Loss     Fever     Conjunctivitis     Panel Management     flu shot, DAP       Initial /60  Pulse 80  Temp 98.2  F (36.8  C) (Oral)  Ht 5' 5.55\" (1.665 m)  Wt 119 lb (54 kg)  LMP 11/29/2017  SpO2 99%  BMI 19.47 kg/m2 Estimated body mass index is 19.47 kg/(m^2) as calculated from the following:    Height as of this encounter: 5' 5.55\" (1.665 m).    Weight as of this encounter: 119 lb (54 kg).  Medication Reconciliation: complete     Eduardo Tamayo MA    "

## 2017-12-08 ASSESSMENT — ANXIETY QUESTIONNAIRES: GAD7 TOTAL SCORE: 4

## 2017-12-11 ENCOUNTER — TELEPHONE (OUTPATIENT)
Dept: FAMILY MEDICINE | Facility: CLINIC | Age: 37
End: 2017-12-11

## 2017-12-11 DIAGNOSIS — J20.9 ACUTE BRONCHITIS, UNSPECIFIED ORGANISM: Primary | ICD-10-CM

## 2017-12-11 RX ORDER — ALBUTEROL SULFATE 90 UG/1
2 AEROSOL, METERED RESPIRATORY (INHALATION) EVERY 6 HOURS PRN
Qty: 1 INHALER | Refills: 0 | Status: SHIPPED | OUTPATIENT
Start: 2017-12-11 | End: 2018-01-03

## 2017-12-11 NOTE — TELEPHONE ENCOUNTER
Reason for call:  Patient has tightness in her chest from a cold.  She thought she would like to try an inhaler as discussed at there last visit, or would there be anything else she should try.  She uses [facility] in ER.  Ok to leave detailed message.

## 2017-12-18 ENCOUNTER — MYC MEDICAL ADVICE (OUTPATIENT)
Dept: FAMILY MEDICINE | Facility: CLINIC | Age: 37
End: 2017-12-18

## 2017-12-19 ENCOUNTER — TRANSFERRED RECORDS (OUTPATIENT)
Dept: HEALTH INFORMATION MANAGEMENT | Facility: CLINIC | Age: 37
End: 2017-12-19

## 2017-12-26 ENCOUNTER — HOSPITAL ENCOUNTER (EMERGENCY)
Facility: CLINIC | Age: 37
Discharge: HOME | End: 2017-12-26
Attending: FAMILY MEDICINE | Admitting: FAMILY MEDICINE
Payer: COMMERCIAL

## 2017-12-26 VITALS
HEART RATE: 88 BPM | DIASTOLIC BLOOD PRESSURE: 74 MMHG | RESPIRATION RATE: 18 BRPM | OXYGEN SATURATION: 96 % | SYSTOLIC BLOOD PRESSURE: 110 MMHG | BODY MASS INDEX: 18.99 KG/M2 | TEMPERATURE: 97.7 F | WEIGHT: 121 LBS | HEIGHT: 67 IN

## 2017-12-26 DIAGNOSIS — R35.0 URINARY FREQUENCY: ICD-10-CM

## 2017-12-26 DIAGNOSIS — R10.84 ABDOMINAL PAIN, GENERALIZED: ICD-10-CM

## 2017-12-26 LAB
ALBUMIN SERPL-MCNC: 2.5 G/DL (ref 3.4–5)
ALBUMIN UR-MCNC: NEGATIVE MG/DL
ALP SERPL-CCNC: 88 U/L (ref 40–150)
ALT SERPL W P-5'-P-CCNC: 13 U/L (ref 0–50)
ANION GAP SERPL CALCULATED.3IONS-SCNC: 7 MMOL/L (ref 3–14)
APPEARANCE UR: ABNORMAL
AST SERPL W P-5'-P-CCNC: 13 U/L (ref 0–45)
BASOPHILS # BLD AUTO: 0 10E9/L (ref 0–0.2)
BASOPHILS NFR BLD AUTO: 0.3 %
BILIRUB SERPL-MCNC: 0.5 MG/DL (ref 0.2–1.3)
BILIRUB UR QL STRIP: NEGATIVE
BUN SERPL-MCNC: 9 MG/DL (ref 7–30)
CALCIUM SERPL-MCNC: 8 MG/DL (ref 8.5–10.1)
CHLORIDE SERPL-SCNC: 108 MMOL/L (ref 94–109)
CO2 SERPL-SCNC: 26 MMOL/L (ref 20–32)
COLOR UR AUTO: YELLOW
CREAT SERPL-MCNC: 0.8 MG/DL (ref 0.52–1.04)
CRP SERPL-MCNC: 19.1 MG/L (ref 0–8)
DIFFERENTIAL METHOD BLD: ABNORMAL
EOSINOPHIL # BLD AUTO: 0.1 10E9/L (ref 0–0.7)
EOSINOPHIL NFR BLD AUTO: 2 %
ERYTHROCYTE [DISTWIDTH] IN BLOOD BY AUTOMATED COUNT: 14.8 % (ref 10–15)
GFR SERPL CREATININE-BSD FRML MDRD: 80 ML/MIN/1.7M2
GLUCOSE SERPL-MCNC: 89 MG/DL (ref 70–99)
GLUCOSE UR STRIP-MCNC: NEGATIVE MG/DL
HCG UR QL: NEGATIVE
HCT VFR BLD AUTO: 44.1 % (ref 35–47)
HGB BLD-MCNC: 13.7 G/DL (ref 11.7–15.7)
HGB UR QL STRIP: NEGATIVE
IMM GRANULOCYTES # BLD: 0 10E9/L (ref 0–0.4)
IMM GRANULOCYTES NFR BLD: 0.1 %
KETONES UR STRIP-MCNC: 20 MG/DL
LEUKOCYTE ESTERASE UR QL STRIP: ABNORMAL
LYMPHOCYTES # BLD AUTO: 1.8 10E9/L (ref 0.8–5.3)
LYMPHOCYTES NFR BLD AUTO: 25.1 %
MCH RBC QN AUTO: 27.1 PG (ref 26.5–33)
MCHC RBC AUTO-ENTMCNC: 31.1 G/DL (ref 31.5–36.5)
MCV RBC AUTO: 87 FL (ref 78–100)
MONOCYTES # BLD AUTO: 0.7 10E9/L (ref 0–1.3)
MONOCYTES NFR BLD AUTO: 10.3 %
MUCOUS THREADS #/AREA URNS LPF: PRESENT /LPF
NEUTROPHILS # BLD AUTO: 4.4 10E9/L (ref 1.6–8.3)
NEUTROPHILS NFR BLD AUTO: 62.2 %
NITRATE UR QL: NEGATIVE
PH UR STRIP: 5 PH (ref 5–7)
PLATELET # BLD AUTO: 456 10E9/L (ref 150–450)
POTASSIUM SERPL-SCNC: 4 MMOL/L (ref 3.4–5.3)
PROT SERPL-MCNC: 5.6 G/DL (ref 6.8–8.8)
RBC # BLD AUTO: 5.06 10E12/L (ref 3.8–5.2)
RBC #/AREA URNS AUTO: 2 /HPF (ref 0–2)
SODIUM SERPL-SCNC: 141 MMOL/L (ref 133–144)
SOURCE: ABNORMAL
SP GR UR STRIP: 1.03 (ref 1–1.03)
SQUAMOUS #/AREA URNS AUTO: 7 /HPF (ref 0–1)
UROBILINOGEN UR STRIP-MCNC: 0 MG/DL (ref 0–2)
WBC # BLD AUTO: 7.1 10E9/L (ref 4–11)
WBC #/AREA URNS AUTO: 6 /HPF (ref 0–2)

## 2017-12-26 PROCEDURE — 96375 TX/PRO/DX INJ NEW DRUG ADDON: CPT | Performed by: FAMILY MEDICINE

## 2017-12-26 PROCEDURE — 25000128 H RX IP 250 OP 636: Performed by: FAMILY MEDICINE

## 2017-12-26 PROCEDURE — 96376 TX/PRO/DX INJ SAME DRUG ADON: CPT | Performed by: FAMILY MEDICINE

## 2017-12-26 PROCEDURE — 96361 HYDRATE IV INFUSION ADD-ON: CPT | Performed by: FAMILY MEDICINE

## 2017-12-26 PROCEDURE — 81025 URINE PREGNANCY TEST: CPT | Performed by: FAMILY MEDICINE

## 2017-12-26 PROCEDURE — 87086 URINE CULTURE/COLONY COUNT: CPT | Performed by: FAMILY MEDICINE

## 2017-12-26 PROCEDURE — 81001 URINALYSIS AUTO W/SCOPE: CPT | Performed by: FAMILY MEDICINE

## 2017-12-26 PROCEDURE — 80053 COMPREHEN METABOLIC PANEL: CPT | Performed by: FAMILY MEDICINE

## 2017-12-26 PROCEDURE — 99285 EMERGENCY DEPT VISIT HI MDM: CPT | Mod: Z6 | Performed by: FAMILY MEDICINE

## 2017-12-26 PROCEDURE — 85025 COMPLETE CBC W/AUTO DIFF WBC: CPT | Performed by: FAMILY MEDICINE

## 2017-12-26 PROCEDURE — 99285 EMERGENCY DEPT VISIT HI MDM: CPT | Mod: 25 | Performed by: FAMILY MEDICINE

## 2017-12-26 PROCEDURE — 96374 THER/PROPH/DIAG INJ IV PUSH: CPT | Performed by: FAMILY MEDICINE

## 2017-12-26 PROCEDURE — 86140 C-REACTIVE PROTEIN: CPT | Performed by: FAMILY MEDICINE

## 2017-12-26 RX ORDER — ONDANSETRON 2 MG/ML
4 INJECTION INTRAMUSCULAR; INTRAVENOUS EVERY 30 MIN PRN
Status: DISCONTINUED | OUTPATIENT
Start: 2017-12-26 | End: 2017-12-26 | Stop reason: HOSPADM

## 2017-12-26 RX ORDER — HYDROMORPHONE HYDROCHLORIDE 1 MG/ML
0.5 INJECTION, SOLUTION INTRAMUSCULAR; INTRAVENOUS; SUBCUTANEOUS
Status: DISCONTINUED | OUTPATIENT
Start: 2017-12-26 | End: 2017-12-26 | Stop reason: HOSPADM

## 2017-12-26 RX ORDER — CIPROFLOXACIN 500 MG/1
500 TABLET, FILM COATED ORAL 2 TIMES DAILY
Qty: 14 TABLET | Refills: 0 | Status: SHIPPED | OUTPATIENT
Start: 2017-12-26 | End: 2018-01-03

## 2017-12-26 RX ORDER — OXYCODONE HYDROCHLORIDE 5 MG/1
2.5-5 TABLET ORAL EVERY 6 HOURS PRN
Qty: 20 TABLET | Refills: 0 | Status: SHIPPED | OUTPATIENT
Start: 2017-12-26 | End: 2018-12-06

## 2017-12-26 RX ORDER — ONDANSETRON 4 MG/1
4 TABLET, ORALLY DISINTEGRATING ORAL EVERY 6 HOURS PRN
Qty: 8 TABLET | Refills: 0 | Status: SHIPPED | OUTPATIENT
Start: 2017-12-26 | End: 2017-12-31

## 2017-12-26 RX ADMIN — ONDANSETRON 4 MG: 2 INJECTION INTRAMUSCULAR; INTRAVENOUS at 19:39

## 2017-12-26 RX ADMIN — HYDROMORPHONE HYDROCHLORIDE 0.5 MG: 1 INJECTION, SOLUTION INTRAMUSCULAR; INTRAVENOUS; SUBCUTANEOUS at 20:43

## 2017-12-26 RX ADMIN — SODIUM CHLORIDE 1000 ML: 9 INJECTION, SOLUTION INTRAVENOUS at 19:39

## 2017-12-26 RX ADMIN — HYDROMORPHONE HYDROCHLORIDE 0.5 MG: 1 INJECTION, SOLUTION INTRAMUSCULAR; INTRAVENOUS; SUBCUTANEOUS at 19:40

## 2017-12-26 NOTE — ED AVS SNAPSHOT
[facility] Emergency Department    [address] [place] [address]    Phone:  [phone]    Fax:  [phone]                                       Soo Timmons   MRN: [id number]    Department:  [facility] Emergency Department   Date of Visit:  12/26/2017           After Visit Summary Signature Page     I have received my discharge instructions, and my questions have been answered. I have discussed any challenges I see with this plan with the nurse or doctor.    ..........................................................................................................................................  Patient/Patient Representative Signature      ..........................................................................................................................................  Patient Representative Print Name and Relationship to Patient    ..................................................               ................................................  Date                                            Time    ..........................................................................................................................................  Reviewed by Signature/Title    ...................................................              ..............................................  Date                                                            Time

## 2017-12-26 NOTE — ED AVS SNAPSHOT
[facility] Emergency Department    [address] [place] [address]    Phone:  [phone]    Fax:  [phone]                                       Soo Timmons   MRN: [id number]    Department:  [facility] Emergency Department   Date of Visit:  12/26/2017           Patient Information     Date Of Birth          1980        Your diagnoses for this visit were:     Abdominal pain, epigastric and bilateral flank     Urinary frequency        You were seen by Arya Mondragon MD.      Follow-up Information     Follow up with Rossi Ambrose APRN CNP In 3 days.    Specialty:  Family Practice    Why:  if not improving    Contact information:    [address] [place] [address] [phone]          Follow up with [facility] Emergency Department.    Specialty:  EMERGENCY MEDICINE    Why:  If symptoms worsen    Contact information:    [address] [place] [address] [phone]    Additional information:    From [facility] [address] [facility] [address]: Exit at [employer] [address] on south side of [place]. Turn right on [address]. Turn left at stoplight on [address]. [facility] will be in view two blocks ahead        Discharge Instructions       Thank you for giving us the opportunity to see you.  You are having urinary frequency with abdominal and flank pain, with low-grade fever and vomiting.  This is suspicious for a urinary tract infection, and possibly early pyelonephritis.  Your blood work is reassuring today.  A urine culture is pending.    Begin Cipro 500 mg twice a day for 7 days.  Drink plenty of fluids to stay hydrated.    Take Zofran ODT every 6 hours as needed for nausea, and oxycodone 1/2-1 tablet as needed for pain.    The following medications were given during your stay: IV fluids, Zofran, Dilaudid    Since you received an opiate pain medication or sedative during your visit, please do not drive for at least 8 hours.     If you are not seeing an  improvement within 2-3 days, please follow up with your primary care provider or clinic.     After discharge, please closely monitor for any new or worsening symptoms. Return to the Emergency Department at any time if your symptoms worsen.        Discharge References/Attachments     URINARY TRACT INFECTIONS IN WOMEN (ENGLISH)      24 Hour Appointment Hotline       To make an appointment at any [facility] clinic, call [phone] ([phone]). If you don't have a family doctor or clinic, we will help you find one. [facility] clinics are conveniently located to serve the needs of you and your family.             Review of your medicines      START taking        Dose / Directions Last dose taken    ciprofloxacin 500 MG tablet   Commonly known as:  CIPRO   Dose:  500 mg   Quantity:  14 tablet        Take 1 tablet (500 mg) by mouth 2 times daily for 7 days   Refills:  0        ondansetron 4 MG ODT tab   Commonly known as:  ZOFRAN ODT   Dose:  4 mg   Quantity:  8 tablet        Take 1 tablet (4 mg) by mouth every 6 hours as needed for nausea   Refills:  0        oxyCODONE IR 5 MG tablet   Commonly known as:  ROXICODONE   Dose:  2.5-5 mg   Quantity:  20 tablet        Take 0.5-1 tablets (2.5-5 mg) by mouth every 6 hours as needed for pain   Refills:  0          Our records show that you are taking the medicines listed below. If these are incorrect, please call your family doctor or clinic.        Dose / Directions Last dose taken    albuterol 108 (90 BASE) MCG/ACT Inhaler   Commonly known as:  PROAIR HFA/PROVENTIL HFA/VENTOLIN HFA   Dose:  2 puff   Quantity:  1 Inhaler        Inhale 2 puffs into the lungs every 6 hours as needed for shortness of breath / dyspnea or wheezing   Refills:  0        azithromycin 250 MG tablet   Commonly known as:  ZITHROMAX   Quantity:  6 tablet        2 tablets daily on day one, then one tablet po daily until gone.   Refills:  0        benzonatate 200 MG capsule   Commonly known as:  TESSALON    Dose:  200 mg   Quantity:  21 capsule        Take 1 capsule (200 mg) by mouth 3 times daily as needed for cough   Refills:  0        busPIRone 5 MG tablet   Commonly known as:  BUSPAR   Quantity:  150 tablet        Start at 5 mg twice daily for 3 days, then 7.5 mg (1.5 tabs) twice daily for 3 days, then 10 mg (2 tabs) twice daily for 3 days, then 12.5 mg (2.5 tabs) twice daily for 3 days, then 15 mg (3 tabs) twice daily and stay at that dose   Refills:  0        clonazePAM 0.5 MG tablet   Commonly known as:  klonoPIN   Dose:  0.25-0.5 mg   Quantity:  10 tablet        Take 0.5-1 tablets (0.25-0.5 mg) by mouth daily as needed for anxiety   Refills:  0        FLUoxetine 40 MG capsule   Commonly known as:  PROzac   Dose:  40 mg   Quantity:  90 capsule        Take 1 capsule (40 mg) by mouth daily   Refills:  1        hydrOXYzine 25 MG tablet   Commonly known as:  ATARAX   Dose:  12.5-25 mg   Quantity:  20 tablet        Take 0.5-1 tablets (12.5-25 mg) by mouth every 6 hours as needed for anxiety   Refills:  1        MIRENA (52 MG) 20 MCG/24HR IUD   Quantity:  1 Intra Uterine Device   Generic drug:  levonorgestrel        intrauterine uterine device placed   Refills:  0        Multi-vitamin Tabs tablet   Dose:  1 tablet        Take 1 tablet by mouth daily   Refills:  0        oxyCODONE-acetaminophen 5-325 MG per tablet   Commonly known as:  PERCOCET   Dose:  1 tablet   Quantity:  10 tablet        Take 1 tablet by mouth every 6 hours as needed   Refills:  0        PROBIOTIC ADVANCED PO        Refills:  0                Prescriptions were sent or printed at these locations (3 Prescriptions)                   [facility]   [address] [address]    Telephone:  [phone]   Fax:  [phone]   Hours:                  Printed at Department/Unit printer (1 of 3)         oxyCODONE IR (ROXICODONE) 5 MG tablet                 These medications are not ready yet because we are  checking if your insurance will help you pay for them. Call your pharmacy to confirm that your medication is ready for pickup. It may take up to 24 hours for them to receive the prescription. If the prescription is not ready within 3 business days, please contact your clinic or your provider (2 of 3)         ciprofloxacin (CIPRO) 500 MG tablet               ondansetron (ZOFRAN ODT) 4 MG ODT tab                Procedures and tests performed during your visit     CBC with platelets differential    CRP inflammation    Comprehensive metabolic panel    HCG qualitative urine    Peripheral IV: Standard    UA with Microscopic    Urine Culture Aerobic Bacterial      Orders Needing Specimen Collection     None      Pending Results     Date and Time Order Name Status Description    12/26/2017 1924 Urine Culture Aerobic Bacterial In process             Pending Culture Results     Date and Time Order Name Status Description    12/26/2017 1924 Urine Culture Aerobic Bacterial In process             Pending Results Instructions     If you had any lab results that were not finalized at the time of your Discharge, you can call the ED Lab Result RN at [phone]. You will be contacted by this team for any positive Lab results or changes in treatment. The nurses are available 7 days a week from 10A to 6:30P.  You can leave a message 24 hours per day and they will return your call.        Thank you for choosing [facility]       Thank you for choosing [facility] for your care. Our goal is always to provide you with excellent care. Hearing back from our patients is one way we can continue to improve our services. Please take a few minutes to complete the written survey that you may receive in the mail after you visit with us. Thank you!        [facility] Information     [employer] gives you secure access to your electronic health record. If you see a primary care provider, you can also send messages to your care team and make appointments. If you  have questions, please call your primary care clinic.  If you do not have a primary care provider, please call [phone] and they will assist you.        Care EveryWhere ID     This is your Care EveryWhere ID. This could be used by other organizations to access your [facility] medical records  [id number]        Equal Access to Services     DELORES COBIAN : Kathi Dunlap, thelma joaquin, hira moreno. So [facility] [phone].    ATENCIÓN: Si habla español, tiene a meier disposición servicios gratuitos de asistencia lingüística. Llame al [phone].    We comply with applicable federal civil rights laws and [place] laws. We do not discriminate on the basis of race, color, national origin, age, disability, sex, sexual orientation, or gender identity.            After Visit Summary       This is your record. Keep this with you and show to your community pharmacist(s) and doctor(s) at your next visit.

## 2017-12-27 LAB
BACTERIA SPEC CULT: NO GROWTH
SPECIMEN SOURCE: NORMAL

## 2017-12-27 NOTE — ED NOTES
Presents with concerns for possible Crohn's flare vs. Kidney pain. Patient reports nausea, vomiting, fever abdominal and flank pain since yesterday, worse this evening. Jolie Bernard RN

## 2017-12-27 NOTE — DISCHARGE INSTRUCTIONS
Thank you for giving us the opportunity to see you.  You are having urinary frequency with abdominal and flank pain, with low-grade fever and vomiting.  This is suspicious for a urinary tract infection, and possibly early pyelonephritis.  Your blood work is reassuring today.  A urine culture is pending.    Begin Cipro 500 mg twice a day for 7 days.  Drink plenty of fluids to stay hydrated.    Take Zofran ODT every 6 hours as needed for nausea, and oxycodone 1/2-1 tablet as needed for pain.    The following medications were given during your stay: IV fluids, Zofran, Dilaudid    Since you received an opiate pain medication or sedative during your visit, please do not drive for at least 8 hours.     If you are not seeing an improvement within 2-3 days, please follow up with your primary care provider or clinic.     After discharge, please closely monitor for any new or worsening symptoms. Return to the Emergency Department at any time if your symptoms worsen.

## 2017-12-29 ENCOUNTER — TRANSFERRED RECORDS (OUTPATIENT)
Dept: HEALTH INFORMATION MANAGEMENT | Facility: CLINIC | Age: 37
End: 2017-12-29

## 2017-12-29 ENCOUNTER — TELEPHONE (OUTPATIENT)
Dept: EMERGENCY MEDICINE | Facility: CLINIC | Age: 37
End: 2017-12-29

## 2017-12-29 NOTE — TELEPHONE ENCOUNTER
"[facility] Emergency Department Lab result notification:    [place] ED lab result protocol used  Urine Culture    Reason for call  Notify of lab results, assess symptoms,  review ED providers recommendations/discharge instructions (if necessary) and advise per ED lab result f/u protocol    Lab Result  Final urine culture report shows \"NO GROWTH\" and is NEGATIVE.  [place] ED discharge antibiotic: Ciprofloxacin (Cipro) 500 mg tablet, 1 tablet (500 mg) by mouth 2 times daily for 7 days  Patient was on any antibiotic's prior culture collection (Yes/No): unknown  RN verified ED provider Rx's antibiotic only for UTI (Yes/No): Yes  Recommendations per [place] ED Lab result protocol - Urine culture protocol.    Information table from ED Provider visit on 12/26/17  Symptoms reported at ED visit (Chief complaint, HPI) Soo Timmons is a 37 year old female who presented to the emergency department with acute onset of urinary tract symptoms associated with bilateral flank pain, urinary frequency, urgency, nausea and vomiting and low-grade fever.  Patient has a history of small bowel Crohn's disease, and has been seeing specialist at the [place] [facility], with current workup in progress.  She had an MR enterography last week, and is scheduled for a double prep colonoscopy since it was not successful a month ago.  She is not on any current medications for her Crohn's as they are waiting to decide after the colonoscopy.  She has failed biologic agents in the past, and had been on steroids but the plan is to keep her off the steroids.  Patient states that her abdominal pain consists of her usual Crohn's disease pain which is located in the midepigastric region.  She also has bilateral flank and lower quadrant abdominal pain.  She started to have increased pain last night, and this morning, but try to push through the day.  She started to have some vomiting, and presents now with pain that is " described as both sharp and crampy rated 8/10.  There is been no alleviating factors.  She states she has low-grade fever of 101  earlier today and feels chilled.  She has been prone to urinary tract and particularly pyelonephritis.  He states there is no possibility of pregnancy as she has an IUD, and her  has had a vasectomy.  Patient has been making urine today, but feels thirsty.  She does not feel like she could drink as much as she needs.  She has no other significant past medical history and no previous abdominal surgeries.  She was here in July, and states that the medication she received then helped tremendously during her Crohn's flareup.      ED providers Impression and Plan (applicable information) Soo Timmons is a 37 year old female who presented to the emergency department with acute urinary symptoms associated with low-grade fever, bilateral flank pain left side worse than right and nausea and vomiting.  She has a history of Crohn's disease with additional workup in progress.  Patient states that her midepigastric abdominal pain is typical for the Crohn's disease, but not the flank or lower abdominal pains.  She has been having urinary frequency with urgency and has a significant history for pyelonephritis.  Patient was seen shortly after arrival.  She felt dehydrated, and received a liter of normal saline.  Her urinalysis revealed positive ketones with 6 white blood cells, 2 red blood cells, but 7 squamous epithelial cells.  A urine culture is pending.  The patient's CBC revealed a normal white blood count of 7.1 with 62% neutrophils.  Platelet count however is elevated.  Competency metabolic panel is reassuring although her albumin level is on the low side.  She has normal BUN, creatinine, and electrolytes.  CRP is elevated at 19.1.  Patient felt better after receiving IV fluids, Zofran and Dilaudid.  She has a history of pyelonephritis, but her urinalysis is relatively benign.  Given  "that she has a fever with acute urinary symptoms, patient will be treated for presumptive UTI with Cipro.  Continue Zofran for nausea, and oxycodone sparingly for pain.  Recheck in 2-3 days if not improving.  Return to the ED at any time if symptoms worsen.  Patient was able to tolerate a oral fluid challenge.  She is scheduled for a double prep colonoscopy next Tuesday.   Miscellaneous information N/A     RN Assessment (Patient s current Symptoms), include time called.  [Insert Left message here if message left]  Did f/u with GI provider \"it is the stricture bothering me\".  Pain is manageable, will talk to GI provider about pain meds, afraid to take Oxycodone as prescribed in ED, however did just take 1/2 dose.  Has been taking Tylenol.  Will have surgery soon to repair GI strictures.   Not on abx previous to  ED visit.    RN Recommendations/Instructions per [facility] ED lab result protocol  To stop abx.    Please Contact your PCP clinic or return to the Emergency department if your:    Symptoms return.    Symptoms worsen or other concerning symptom's.    PCP follow-up Questions asked: YES       Corinne Longoria RN    [facility] RN  Lung Nodule and ED Lab Results F/U RN  Epic pool (ED late result f/u RN) : [id number]  Ph # [phone]    Copy of Lab result   Order   Urine Culture Aerobic Bacterial [[id number]] (Order [id number])   Exam Information   Exam Date Exam Time Accession # Results    12/26/17  7:00 PM [id number]    Component Results   Component Collected Lab   Specimen Description 12/26/2017  7:00    Midstream Urine   Culture Micro 12/26/2017  7:00    No growth             "

## 2018-01-02 ENCOUNTER — TRANSFERRED RECORDS (OUTPATIENT)
Dept: HEALTH INFORMATION MANAGEMENT | Facility: CLINIC | Age: 38
End: 2018-01-02

## 2018-01-02 ENCOUNTER — HOSPITAL ENCOUNTER (EMERGENCY)
Facility: CLINIC | Age: 38
Discharge: ANOTHER HOSPITAL | End: 2018-01-03
Attending: EMERGENCY MEDICINE | Admitting: EMERGENCY MEDICINE
Payer: COMMERCIAL

## 2018-01-02 ENCOUNTER — APPOINTMENT (OUTPATIENT)
Dept: CT IMAGING | Facility: CLINIC | Age: 38
End: 2018-01-02
Attending: EMERGENCY MEDICINE
Payer: COMMERCIAL

## 2018-01-02 DIAGNOSIS — K56.609 SBO (SMALL BOWEL OBSTRUCTION) (H): ICD-10-CM

## 2018-01-02 DIAGNOSIS — K50.012 CROHN'S DISEASE OF SMALL INTESTINE WITH INTESTINAL OBSTRUCTION (H): ICD-10-CM

## 2018-01-02 DIAGNOSIS — R19.7 BLOODY DIARRHEA: ICD-10-CM

## 2018-01-02 LAB
ALBUMIN SERPL-MCNC: 2.1 G/DL (ref 3.4–5)
ALP SERPL-CCNC: 90 U/L (ref 40–150)
ALT SERPL W P-5'-P-CCNC: 14 U/L (ref 0–50)
AMYLASE SERPL-CCNC: 35 U/L (ref 30–110)
ANION GAP SERPL CALCULATED.3IONS-SCNC: 7 MMOL/L (ref 3–14)
AST SERPL W P-5'-P-CCNC: 12 U/L (ref 0–45)
BASOPHILS # BLD AUTO: 0 10E9/L (ref 0–0.2)
BASOPHILS NFR BLD AUTO: 0 %
BILIRUB SERPL-MCNC: 0.3 MG/DL (ref 0.2–1.3)
BUN SERPL-MCNC: 9 MG/DL (ref 7–30)
CALCIUM SERPL-MCNC: 7.6 MG/DL (ref 8.5–10.1)
CHLORIDE SERPL-SCNC: 111 MMOL/L (ref 94–109)
CO2 SERPL-SCNC: 25 MMOL/L (ref 20–32)
CREAT SERPL-MCNC: 0.9 MG/DL (ref 0.52–1.04)
CRP SERPL-MCNC: 10.9 MG/L (ref 0–8)
DIFFERENTIAL METHOD BLD: ABNORMAL
EOSINOPHIL # BLD AUTO: 0 10E9/L (ref 0–0.7)
EOSINOPHIL NFR BLD AUTO: 0.1 %
ERYTHROCYTE [DISTWIDTH] IN BLOOD BY AUTOMATED COUNT: 14.6 % (ref 10–15)
GFR SERPL CREATININE-BSD FRML MDRD: 70 ML/MIN/1.7M2
GLUCOSE SERPL-MCNC: 121 MG/DL (ref 70–99)
HCT VFR BLD AUTO: 38.9 % (ref 35–47)
HGB BLD-MCNC: 12.2 G/DL (ref 11.7–15.7)
IMM GRANULOCYTES # BLD: 0 10E9/L (ref 0–0.4)
IMM GRANULOCYTES NFR BLD: 0.1 %
LACTATE BLD-SCNC: 1.5 MMOL/L (ref 0.7–2)
LIPASE SERPL-CCNC: 108 U/L (ref 73–393)
LYMPHOCYTES # BLD AUTO: 0.3 10E9/L (ref 0.8–5.3)
LYMPHOCYTES NFR BLD AUTO: 2.3 %
MCH RBC QN AUTO: 27.2 PG (ref 26.5–33)
MCHC RBC AUTO-ENTMCNC: 31.4 G/DL (ref 31.5–36.5)
MCV RBC AUTO: 87 FL (ref 78–100)
MONOCYTES # BLD AUTO: 0.1 10E9/L (ref 0–1.3)
MONOCYTES NFR BLD AUTO: 0.8 %
NEUTROPHILS # BLD AUTO: 10.7 10E9/L (ref 1.6–8.3)
NEUTROPHILS NFR BLD AUTO: 96.7 %
PLATELET # BLD AUTO: 367 10E9/L (ref 150–450)
POTASSIUM SERPL-SCNC: 3.4 MMOL/L (ref 3.4–5.3)
PROT SERPL-MCNC: 4.7 G/DL (ref 6.8–8.8)
RBC # BLD AUTO: 4.48 10E12/L (ref 3.8–5.2)
SODIUM SERPL-SCNC: 143 MMOL/L (ref 133–144)
WBC # BLD AUTO: 11.1 10E9/L (ref 4–11)

## 2018-01-02 PROCEDURE — 99285 EMERGENCY DEPT VISIT HI MDM: CPT | Mod: 25 | Performed by: EMERGENCY MEDICINE

## 2018-01-02 PROCEDURE — 25000128 H RX IP 250 OP 636: Performed by: EMERGENCY MEDICINE

## 2018-01-02 PROCEDURE — 80053 COMPREHEN METABOLIC PANEL: CPT | Performed by: EMERGENCY MEDICINE

## 2018-01-02 PROCEDURE — 96376 TX/PRO/DX INJ SAME DRUG ADON: CPT | Performed by: EMERGENCY MEDICINE

## 2018-01-02 PROCEDURE — 96374 THER/PROPH/DIAG INJ IV PUSH: CPT | Performed by: EMERGENCY MEDICINE

## 2018-01-02 PROCEDURE — 83690 ASSAY OF LIPASE: CPT | Performed by: EMERGENCY MEDICINE

## 2018-01-02 PROCEDURE — 25000125 ZZHC RX 250: Performed by: EMERGENCY MEDICINE

## 2018-01-02 PROCEDURE — 86140 C-REACTIVE PROTEIN: CPT | Performed by: EMERGENCY MEDICINE

## 2018-01-02 PROCEDURE — 85025 COMPLETE CBC W/AUTO DIFF WBC: CPT | Performed by: EMERGENCY MEDICINE

## 2018-01-02 PROCEDURE — 96375 TX/PRO/DX INJ NEW DRUG ADDON: CPT | Performed by: EMERGENCY MEDICINE

## 2018-01-02 PROCEDURE — 74177 CT ABD & PELVIS W/CONTRAST: CPT

## 2018-01-02 PROCEDURE — 83605 ASSAY OF LACTIC ACID: CPT | Performed by: EMERGENCY MEDICINE

## 2018-01-02 PROCEDURE — 82150 ASSAY OF AMYLASE: CPT | Performed by: EMERGENCY MEDICINE

## 2018-01-02 PROCEDURE — 99285 EMERGENCY DEPT VISIT HI MDM: CPT | Mod: Z6 | Performed by: EMERGENCY MEDICINE

## 2018-01-02 RX ORDER — HYDROMORPHONE HYDROCHLORIDE 1 MG/ML
0.5 INJECTION, SOLUTION INTRAMUSCULAR; INTRAVENOUS; SUBCUTANEOUS
Status: COMPLETED | OUTPATIENT
Start: 2018-01-02 | End: 2018-01-03

## 2018-01-02 RX ORDER — ONDANSETRON 2 MG/ML
4 INJECTION INTRAMUSCULAR; INTRAVENOUS EVERY 30 MIN PRN
Status: COMPLETED | OUTPATIENT
Start: 2018-01-02 | End: 2018-01-03

## 2018-01-02 RX ORDER — IOPAMIDOL 755 MG/ML
100 INJECTION, SOLUTION INTRAVASCULAR ONCE
Status: COMPLETED | OUTPATIENT
Start: 2018-01-02 | End: 2018-01-02

## 2018-01-02 RX ADMIN — ONDANSETRON 4 MG: 2 INJECTION, SOLUTION INTRAMUSCULAR; INTRAVENOUS at 22:35

## 2018-01-02 RX ADMIN — SODIUM CHLORIDE, PRESERVATIVE FREE 60 ML: 5 INJECTION INTRAVENOUS at 23:06

## 2018-01-02 RX ADMIN — ONDANSETRON 4 MG: 2 INJECTION, SOLUTION INTRAMUSCULAR; INTRAVENOUS at 23:38

## 2018-01-02 RX ADMIN — HYDROMORPHONE HYDROCHLORIDE 0.5 MG: 1 INJECTION, SOLUTION INTRAMUSCULAR; INTRAVENOUS; SUBCUTANEOUS at 22:37

## 2018-01-02 RX ADMIN — HYDROMORPHONE HYDROCHLORIDE 0.5 MG: 1 INJECTION, SOLUTION INTRAMUSCULAR; INTRAVENOUS; SUBCUTANEOUS at 23:41

## 2018-01-02 RX ADMIN — IOPAMIDOL 56 ML: 755 INJECTION, SOLUTION INTRAVENOUS at 23:07

## 2018-01-03 VITALS
WEIGHT: 115.81 LBS | RESPIRATION RATE: 15 BRPM | DIASTOLIC BLOOD PRESSURE: 57 MMHG | TEMPERATURE: 98.3 F | OXYGEN SATURATION: 95 % | BODY MASS INDEX: 18.14 KG/M2 | SYSTOLIC BLOOD PRESSURE: 103 MMHG

## 2018-01-03 PROCEDURE — 96376 TX/PRO/DX INJ SAME DRUG ADON: CPT | Performed by: EMERGENCY MEDICINE

## 2018-01-03 PROCEDURE — 96375 TX/PRO/DX INJ NEW DRUG ADDON: CPT | Performed by: EMERGENCY MEDICINE

## 2018-01-03 PROCEDURE — 25000128 H RX IP 250 OP 636: Performed by: EMERGENCY MEDICINE

## 2018-01-03 RX ORDER — HYDROMORPHONE HYDROCHLORIDE 1 MG/ML
0.5 INJECTION, SOLUTION INTRAMUSCULAR; INTRAVENOUS; SUBCUTANEOUS
Status: DISCONTINUED | OUTPATIENT
Start: 2018-01-03 | End: 2018-01-03 | Stop reason: HOSPADM

## 2018-01-03 RX ORDER — METOCLOPRAMIDE HYDROCHLORIDE 5 MG/ML
10 INJECTION INTRAMUSCULAR; INTRAVENOUS ONCE
Status: COMPLETED | OUTPATIENT
Start: 2018-01-03 | End: 2018-01-03

## 2018-01-03 RX ADMIN — HYDROMORPHONE HYDROCHLORIDE 0.5 MG: 1 INJECTION, SOLUTION INTRAMUSCULAR; INTRAVENOUS; SUBCUTANEOUS at 00:37

## 2018-01-03 RX ADMIN — HYDROMORPHONE HYDROCHLORIDE 0.5 MG: 1 INJECTION, SOLUTION INTRAMUSCULAR; INTRAVENOUS; SUBCUTANEOUS at 02:34

## 2018-01-03 RX ADMIN — METOCLOPRAMIDE 10 MG: 5 INJECTION, SOLUTION INTRAMUSCULAR; INTRAVENOUS at 01:57

## 2018-01-03 RX ADMIN — ONDANSETRON 4 MG: 2 INJECTION, SOLUTION INTRAMUSCULAR; INTRAVENOUS at 01:01

## 2018-01-03 ASSESSMENT — ENCOUNTER SYMPTOMS
DIARRHEA: 1
VOMITING: 1
ABDOMINAL DISTENTION: 1
LIGHT-HEADEDNESS: 1
NAUSEA: 1
BLOOD IN STOOL: 1
ABDOMINAL PAIN: 1

## 2018-01-03 NOTE — ED PROVIDER NOTES
History     Chief Complaint   Patient presents with     Post-op Problem     The history is provided by the patient.     Soo Timmons is a 37 year old female who presents to the ED for evaluation of worsening abdominal pain and bloody diarrhea since having her colonoscopy [facility] earlier today.  Reviewing the records, it appears that she had a polypectomy and several biopsies for her Crohn's disease but overall the colonoscopy appeared to be good.  Her abdominal pain started shortly after returning home and eating a waffle.  Shortly after the onset of pain she vomited and then her pain started to worsen.  She is remained nauseous and comes to the ED for evaluation of worsening abdominal pain, bloating, and the recurring bloody diarrhea.  She reports 5 episodes of bloody diarrhea since the colonoscopy earlier today.  Again she has a history for Crohn's disease and it does not appear that she is on any medication for this at this time.    Problem List:    Patient Active Problem List    Diagnosis Date Noted     Health Care Home 08/19/2011     Priority: High       Status:  Closed  Care Coordinator:  NILSON VALDOVINOS    See Letters for HC Care Plan  Date:  December 22, 2015    Status:  Accepted  Care Coordinator:  NILSON VALDOVINOS    See Letters for [facility] Care Plan  Date:  November 5, 2015             Irritable bowel syndrome 10/02/2015     Priority: Medium     Vitamin B 12 deficiency 08/19/2015     Priority: Medium     Inflammatory bowel disease (Crohn's disease) (H) 11/17/2014     Priority: Medium     Iron deficiency anemia 11/17/2014     Priority: Medium     Malabsorption 03/28/2014     Priority: Medium     Crohn's ileitis (H) 01/10/2014     Priority: Medium     GERD (gastroesophageal reflux disease) 07/11/2013     Priority: Medium     Mild major depression (H) 01/22/2013     Priority: Medium     Colitis 07/05/2012     Priority: Medium     Anxiety 09/29/2011     Priority:  Medium     CARDIOVASCULAR SCREENING; LDL GOAL LESS THAN 160 10/31/2010     Priority: Medium     Surveillance of previously prescribed intrauterine contraceptive device 08/28/2010     Priority: Medium     Anemia 08/28/2010     Priority: Medium     Fatigue 07/01/2010     Priority: Medium     Migraine with aura      Priority: Medium     Problem list name updated by automated process. Provider to review          Past Medical History:    Past Medical History:   Diagnosis Date     Depressive disorder 01/01/1997     Encounter for insertion of mirena IUD 6/2015     Fatigue 7/1/2010     Fatigue 7/1/2010     Fatigue      Inflammatory bowel disease (Crohn's disease) 11/17/2014     Inflammatory bowel disease (Crohn's disease) (H) 11/17/2014     Inflammatory bowel disease (Crohn's disease) (H)      Unspecified contraceptive management      Variants of migraine, not elsewhere classified, without mention of intractable migraine without mention of status migrainosus        Past Surgical History:    Past Surgical History:   Procedure Laterality Date     COLONOSCOPY  10/04/06    [facility]      COLONOSCOPY  12/20/2013    Procedure: COMBINED COLONOSCOPY, SINGLE BIOPSY/POLYPECTOMY BY BIOPSY;;  Surgeon: Presley Ocampo MD;  Location: [facility] OR      REMOVE INTRAUTERINE DEVICE  09/02/08     [facility] UGI ENDOSCOPY, SIMPLE EXAM  10/4/2006     LAPAROSCOPIC CHOLECYSTECTOMY  5/16/2011    Procedure:LAPAROSCOPIC CHOLECYSTECTOMY; Surgeon:LIYAH AVELAR; Location:[facility] OR       Family History:    Family History   Problem Relation Age of Onset     Hypertension Mother      Alzheimer Disease Maternal Grandmother      Asthma No family hx of      C.A.D. No family hx of      DIABETES No family hx of      CEREBROVASCULAR DISEASE No family hx of      Breast Cancer No family hx of      Cancer - colorectal No family hx of      Prostate Cancer No family hx of      Alcohol/Drug No family hx of      Allergies No family hx of      Anesthesia Reaction No family  hx of      Arthritis No family hx of      Blood Disease No family hx of      CANCER No family hx of      Circulatory No family hx of      Depression No family hx of      Endocrine Disease No family hx of      Eye Disorder No family hx of      Genetic Disorder No family hx of      Gynecology No family hx of      GASTROINTESTINAL DISEASE No family hx of      Genitourinary Problems No family hx of      HEART DISEASE No family hx of      Lipids No family hx of      Neurologic Disorder No family hx of      Obesity No family hx of      Hearing Loss No family hx of      Respiratory No family hx of      OSTEOPOROSIS No family hx of      Musculoskeletal Disorder No family hx of      Thyroid Disease No family hx of      Psychotic Disorder No family hx of      Cardiovascular No family hx of      Congenital Anomalies No family hx of      Connective Tissue Disorder No family hx of      Coronary Artery Disease No family hx of      Hyperlipidemia No family hx of      Ovarian Cancer No family hx of      Depression/Anxiety No family hx of      Thyroid Disease No family hx of      Chemical Addiction No family hx of      Known Genetic Syndrome No family hx of      Anxiety Disorder No family hx of      MENTAL ILLNESS No family hx of      Substance Abuse No family hx of      Colon Cancer No family hx of      Other Cancer No family hx of        Social History:  Marital Status:   [2]  Social History   Substance Use Topics     Smoking status: Never Smoker     Smokeless tobacco: Never Used     Alcohol use No      Comment: rare        Medications:      Acetaminophen (TYLENOL PO)   oxyCODONE IR (ROXICODONE) 5 MG tablet   Probiotic Product (PROBIOTIC ADVANCED PO)   multivitamin, therapeutic with minerals (MULTI-VITAMIN) TABS   MIRENA 20 MCG/24HR IU IUD   FLUoxetine (PROZAC) 40 MG capsule   busPIRone (BUSPAR) 5 MG tablet   hydrOXYzine (ATARAX) 25 MG tablet   clonazePAM (KLONOPIN) 0.5 MG tablet         Review of Systems    Gastrointestinal: Positive for abdominal distention, abdominal pain, blood in stool, diarrhea, nausea and vomiting.   Neurological: Positive for light-headedness.   All other systems reviewed and are negative.      Physical Exam   BP: 103/61  Heart Rate: 112  Temp: 98.9  F (37.2  C)  Resp: 20  Weight: 52.5 kg (115 lb 13 oz)  SpO2: 97 %      Physical Exam   Constitutional: She is oriented to person, place, and time. She appears well-developed. She appears distressed.   HENT:   Head: Normocephalic and atraumatic.   Mouth/Throat: Oropharynx is clear and moist.   Eyes: EOM are normal. Pupils are equal, round, and reactive to light.   Neck: Normal range of motion. Neck supple.   Cardiovascular: Normal heart sounds and intact distal pulses.  Tachycardia present.    Pulmonary/Chest: Effort normal and breath sounds normal.   Abdominal: Soft. She exhibits distension. Bowel sounds are decreased. There is generalized tenderness. There is rebound and guarding. There is no CVA tenderness.   Musculoskeletal: Normal range of motion.   Lymphadenopathy:     She has no cervical adenopathy.   Neurological: She is alert and oriented to person, place, and time.   Skin: Skin is warm. She is not diaphoretic.   Psychiatric: She has a normal mood and affect.   Nursing note and vitals reviewed.      ED Course     ED Course     Procedures          Results for orders placed or performed during the hospital encounter of 01/02/18 (from the past 48 hour(s))   CBC with platelets differential   Result Value Ref Range    WBC 11.1 (H) 4.0 - 11.0 10e9/L    RBC Count 4.48 3.8 - 5.2 10e12/L    Hemoglobin 12.2 11.7 - 15.7 g/dL    Hematocrit 38.9 35.0 - 47.0 %    MCV 87 78 - 100 fl    MCH 27.2 26.5 - 33.0 pg    MCHC 31.4 (L) 31.5 - 36.5 g/dL    RDW 14.6 10.0 - 15.0 %    Platelet Count 367 150 - 450 10e9/L    Diff Method Automated Method     % Neutrophils 96.7 %    % Lymphocytes 2.3 %    % Monocytes 0.8 %    % Eosinophils 0.1 %    % Basophils 0.0 %    %  Immature Granulocytes 0.1 %    Absolute Neutrophil 10.7 (H) 1.6 - 8.3 10e9/L    Absolute Lymphocytes 0.3 (L) 0.8 - 5.3 10e9/L    Absolute Monocytes 0.1 0.0 - 1.3 10e9/L    Absolute Eosinophils 0.0 0.0 - 0.7 10e9/L    Absolute Basophils 0.0 0.0 - 0.2 10e9/L    Abs Immature Granulocytes 0.0 0 - 0.4 10e9/L   Comprehensive metabolic panel   Result Value Ref Range    Sodium 143 133 - 144 mmol/L    Potassium 3.4 3.4 - 5.3 mmol/L    Chloride 111 (H) 94 - 109 mmol/L    Carbon Dioxide 25 20 - 32 mmol/L    Anion Gap 7 3 - 14 mmol/L    Glucose 121 (H) 70 - 99 mg/dL    Urea Nitrogen 9 7 - 30 mg/dL    Creatinine 0.90 0.52 - 1.04 mg/dL    GFR Estimate 70 >60 mL/min/1.7m2    GFR Estimate If Black 85 >60 mL/min/1.7m2    Calcium 7.6 (L) 8.5 - 10.1 mg/dL    Bilirubin Total 0.3 0.2 - 1.3 mg/dL    Albumin 2.1 (L) 3.4 - 5.0 g/dL    Protein Total 4.7 (L) 6.8 - 8.8 g/dL    Alkaline Phosphatase 90 40 - 150 U/L    ALT 14 0 - 50 U/L    AST 12 0 - 45 U/L   Lipase   Result Value Ref Range    Lipase 108 73 - 393 U/L   Amylase   Result Value Ref Range    Amylase 35 30 - 110 U/L   CRP inflammation   Result Value Ref Range    CRP Inflammation 10.9 (H) 0.0 - 8.0 mg/L   Lactic acid whole blood   Result Value Ref Range    Lactic Acid 1.5 0.7 - 2.0 mmol/L   CT Abdomen Pelvis w Contrast    Narrative    CT ABDOMEN PELVIS W CONTRAST  1/2/2018 11:17 PM     HISTORY: Abdominal pain following colonoscopy with biopsies, history  of Crohn's.     TECHNIQUE: Volumetric acquisition through abdomen and pelvis with IV  contrast. 56 mL Isovue-370. Radiation dose for this scan was reduced  using automated exposure control, adjustment of the mA and/or kV  according to patient size, or iterative reconstruction technique.    COMPARISON: 7/28/2017.    FINDINGS: Gallbladder is absent. Liver, spleen, pancreas, adrenal  glands and kidneys are stable and demonstrate no worrisome findings.    Multifocal areas of bowel wall thickening likely due to inflammatory  bowel disease.  Anatomy of the bowel is somewhat difficult to follow.  There is a significantly dilated fluid and gas-filled loop of bowel in  the midabdomen flanked by segments of wall thickening which is likely  a proximal small bowel loop, more dilated than on the previous study.  Above this there is a second dilated loop, likely a jejunal loop, also  related to segmental wall thickening.    There is moderate wall thickening of the descending colon and a short  segment of probable wall thickening involving the distal transverse  colon. This loop is closely associated with a jejunal loop that is  thick-walled and there could be fistulous communication between the  two loops (series 3, image 15). Surgical clips in the right lower  quadrant.    Uterus is present. There is an IUD in place. No abscess or free air.      Impression    IMPRESSION:  1. Segments of small bowel and colonic wall thickening overall  increased from prior study and likely due to Crohn's disease.  2. Segments of wall thickening involving the descending and possibly  the transverse colon are new.  3. Dilated small bowel loops in the central abdomen have increased  consistent with mechanical small bowel obstruction due to segmental  areas of small bowel wall thickening involving relatively proximal  small bowel.  4. A proximal jejunal thickened segment and a segment of thickened  transverse colon are closely associated and there could be fistulous  communication between the two loops.  5. No abscess or free air.                Assessments & Plan (with Medical Decision Making)  Soo Timmons is a 37-year-old female presenting to the ED for evaluation of increasing pain and bloody diarrhea following the colonoscopy done at the [facility] today.  The patient brings her paperwork from this encounter which shows that she had a polypectomy and multiple biopsies.  She does have a history significant for Crohn's disease but it is  reportedly been under control recently.  By report of the colonoscopy, it appears that her study today was unremarkable.  She did report having an episode of vomiting when her abdominal pain started.  He is had 5 bloody diarrheal stools since returning home from the colonoscopy.  On exam, she is alert but in significant distress complaining out of 9 out of 10 abdominal pain.  She has hypoactive bowel sounds, abdominal distention, and marked tenderness throughout the abdomen.  He does have guarding and rebound tenderness making me concerned that she has a perforation or an acute abdomen.  She denies any fever or chills.  Remainder of her exam is significant for some mild tachycardia, but otherwise she has been vitally stable and afebrile in the ED.  Labs were obtained showing a significant leukocytosis at 11.1 with a left shift 10.7 neutrophils.  Her C-reactive protein is mildly elevated at 10.9.  Her lactic acid is 1.5.  She has a normal amylase and lipase.  A CT of the abdomen and pelvis was performed and compared to her previous CT on 7/28/2017.  The impression was that segments of the small bowel and colonic wall thickening overall increased from prior study likely due to Crohn's disease.  There is segments of wall thickening involving the descending and possibly transverse colon that are new.  There is dilated small bowel loops in the central abdomen that of increased consistent with a mechanical small bowel obstruction due to segmental areas of small bowel wall thickening involving the relatively proximal small bowel.  There is proximal jejunal thickening and a segment of thickened transverse colon closely associated with this that could indicate fistulous communication between the 2 loops.  There is no abscess or free air visualized.  The patient's pain has been under control with Dilaudid and Zofran for the nausea.  Initially, we are going to admit the patient here for decompression and surgical evaluation,  however, I discussed the case with Dr. Estes who recommended patient be transferred to the facility with a GI specialist given her exacerbation of Crohn's disease and possible fistula.  I discussed the case with Dr. Tamayo, the hospitalist from [facility], who accepts the patient in transfer for admission and further workup.  The patient will transfer via ALS ambulance.     I have reviewed the nursing notes.    I have reviewed the findings, diagnosis, plan and need for follow up with the patient.       New Prescriptions    No medications on file       Final diagnoses:   Crohn's disease of small intestine with intestinal obstruction (H)   Bloody diarrhea   SBO (small bowel obstruction)       1/2/2018   [facility] EMERGENCY DEPARTMENT     Pasquale Razo MD  01/03/18 0125

## 2018-01-03 NOTE — ED NOTES
Pt had another 200cc emesis. Walked to the bathroom and brushed her teeth, pt nauseated when she returned to room. Provider notified.

## 2018-01-03 NOTE — ED NOTES
Pt presents after a colonoscopy today.   Pt is having increased pain and bleeding.   Pt states that she vomited then the pain started.

## 2018-01-09 ENCOUNTER — MYC MEDICAL ADVICE (OUTPATIENT)
Dept: FAMILY MEDICINE | Facility: CLINIC | Age: 38
End: 2018-01-09

## 2018-01-10 ENCOUNTER — TRANSFERRED RECORDS (OUTPATIENT)
Dept: HEALTH INFORMATION MANAGEMENT | Facility: CLINIC | Age: 38
End: 2018-01-10

## 2018-01-24 ENCOUNTER — TRANSFERRED RECORDS (OUTPATIENT)
Dept: HEALTH INFORMATION MANAGEMENT | Facility: CLINIC | Age: 38
End: 2018-01-24

## 2018-02-07 ENCOUNTER — TELEPHONE (OUTPATIENT)
Dept: FAMILY MEDICINE | Facility: CLINIC | Age: 38
End: 2018-02-07

## 2018-02-07 NOTE — PROGRESS NOTES
SUBJECTIVE:   Soo Timmons is a 37 year old female who presents to clinic today for the following health issues:      History of Present Illness     Diet:  Gluten-free/reduced and Other  Frequency of exercise:  2-3 days/week  Duration of exercise:  15-30 minutes  Taking medications regularly:  Yes  Medication side effects:  None  Additional concerns today:  No        Hospital Follow-up Visit:    Hospital/Nursing Home/IP Rehab Facility: [facility]  Date of Admission: 1/2  Date of Discharge: 1/19  Reason(s) for Admission: Crohn's            Problems taking medications regularly:  None       Medication changes since discharge: tapering prednisone        Problems adhering to non-medication therapy:  None    Summary of hospitalization:  [facility] discharge summary reviewed  Diagnostic Tests/Treatments reviewed.  Follow up needed: none  Other Healthcare Providers Involved in Patient s Care:         None  Update since discharge: improved.     Post Discharge Medication Reconciliation: discharge medications reconciled and changed, per note/orders (see AVS).  Plan of care communicated with patient     Coding guidelines for this visit:  Type of Medical   Decision Making Face-to-Face Visit       within 7 Days of discharge Face-to-Face Visit        within 14 days of discharge   Moderate Complexity [id number]   High Complexity [id number]          Patient reports for follow up from her hospital visit on 01/02 at [facility]. She was discharged on 01/19. She had an MRI, a scope, and then a colonoscopy. They told her that her large intestine was fine, but they only could go so far into her small intestine. She noticed having rectal bleeding. She went through 4 pads. Patient decided to go into the ED. They gave her pain medications, and did a CT. They told her she had a lot of fluid. They told her they needed to complete another colonoscopy as they had conflicting information. They said she had a lot of  strictures. She had been vomiting. She has not vomited since 01/09. She could only have ice cubes. After they took out the MG tube, she felt distended. They decided to put the MG tube back in, dilate the strictures, and fix her diverticula. She was directed to do a Prednisone taper, and to try Entyvio. She is scared to eat. She eats bland foods, and mostly gluten free. She has been trying to eat gluten, and has not had the symptoms she had before. She has some reflux. Her main worry is that she will have back spasms. She returns to work on 02/14/2018. She was told she cannot take capsules. She has a follow up appointment with Dr. Boyer in 2 weeks. He wants her to have infusions if her iron levels gets too low. She would love to have medicine for her back pain. Hydroxyzine makes her feel drowsy. Of note: She has a family member at home with the Flu.     Depression  She notes that her  went to AA. She and her  have been to counseling. She feels cohesive.     Problem list and histories reviewed & adjusted, as indicated.  Additional history: as documented    Patient Active Problem List   Diagnosis     Migraine with aura     Fatigue     Surveillance of previously prescribed intrauterine contraceptive device     Anemia     CARDIOVASCULAR SCREENING; LDL GOAL LESS THAN 160     Health Care Home     Anxiety     Colitis     Mild major depression (H)     GERD (gastroesophageal reflux disease)     Crohn's ileitis (H)     Malabsorption     Inflammatory bowel disease (Crohn's disease) (H)     Iron deficiency anemia     Vitamin B 12 deficiency     Irritable bowel syndrome     Past Surgical History:   Procedure Laterality Date     COLONOSCOPY  10/04/06    [facility]      COLONOSCOPY  12/20/2013    Procedure: COMBINED COLONOSCOPY, SINGLE BIOPSY/POLYPECTOMY BY BIOPSY;;  Surgeon: Presley Ocampo MD;  Location: [facility] OR     HC REMOVE INTRAUTERINE DEVICE  09/02/08     HC UGI ENDOSCOPY, SIMPLE EXAM  10/4/2006      LAPAROSCOPIC CHOLECYSTECTOMY  5/16/2011    Procedure:LAPAROSCOPIC CHOLECYSTECTOMY; Surgeon:LIYAH AVELAR; Location:[facility] OR       Social History   Substance Use Topics     Smoking status: Never Smoker     Smokeless tobacco: Never Used     Alcohol use No      Comment: rare     Family History   Problem Relation Age of Onset     Hypertension Mother      Alzheimer Disease Maternal Grandmother      Asthma No family hx of      C.A.D. No family hx of      DIABETES No family hx of      CEREBROVASCULAR DISEASE No family hx of      Breast Cancer No family hx of      Cancer - colorectal No family hx of      Prostate Cancer No family hx of      Alcohol/Drug No family hx of      Allergies No family hx of      Anesthesia Reaction No family hx of      Arthritis No family hx of      Blood Disease No family hx of      CANCER No family hx of      Circulatory No family hx of      Depression No family hx of      Endocrine Disease No family hx of      Eye Disorder No family hx of      Genetic Disorder No family hx of      Gynecology No family hx of      GASTROINTESTINAL DISEASE No family hx of      Genitourinary Problems No family hx of      HEART DISEASE No family hx of      Lipids No family hx of      Neurologic Disorder No family hx of      Obesity No family hx of      Hearing Loss No family hx of      Respiratory No family hx of      OSTEOPOROSIS No family hx of      Musculoskeletal Disorder No family hx of      Thyroid Disease No family hx of      Psychotic Disorder No family hx of      Cardiovascular No family hx of      Congenital Anomalies No family hx of      Connective Tissue Disorder No family hx of      Coronary Artery Disease No family hx of      Hyperlipidemia No family hx of      Ovarian Cancer No family hx of      Depression/Anxiety No family hx of      Thyroid Disease No family hx of      Chemical Addiction No family hx of      Known Genetic Syndrome No family hx of      Anxiety Disorder No family hx of      MENTAL  ILLNESS No family hx of      Substance Abuse No family hx of      Colon Cancer No family hx of      Other Cancer No family hx of          Current Outpatient Prescriptions   Medication Sig Dispense Refill     predniSONE (DELTASONE) 10 MG tablet TAKE 4 TABLETS (40 MG) BY MOUTH DAILY FOR 5 DAYS THEN 3 TABLETS (30 MG) DAILY UNTIL GI CLINIC FOLLOW UP  0     FLUoxetine 20 MG tablet Take 2 tablets (40 mg) by mouth daily 180 tablet 1     hydrOXYzine (ATARAX) 25 MG tablet Take 0.5-1 tablets (12.5-25 mg) by mouth every 6 hours as needed for anxiety 20 tablet 1     pantoprazole (PROTONIX) 40 MG EC tablet Take 1 tablet (40 mg) by mouth daily 90 tablet 0     oseltamivir (TAMIFLU) 75 MG capsule Take 1 capsule (75 mg) by mouth daily 10 capsule 0     Acetaminophen (TYLENOL PO) Take 500 mg by mouth       Probiotic Product (PROBIOTIC ADVANCED PO)        multivitamin, therapeutic with minerals (MULTI-VITAMIN) TABS Take 1 tablet by mouth daily       MIRENA 20 MCG/24HR IU IUD intrauterine uterine device placed 1 Intra Uterine Device 0     oxyCODONE IR (ROXICODONE) 5 MG tablet Take 0.5-1 tablets (2.5-5 mg) by mouth every 6 hours as needed for pain (Patient not taking: Reported on 2/8/2018) 20 tablet 0     [DISCONTINUED] FLUoxetine (PROZAC) 40 MG capsule Take 1 capsule (40 mg) by mouth daily (Patient not taking: Reported on 2/8/2018) 90 capsule 1     busPIRone (BUSPAR) 5 MG tablet Start at 5 mg twice daily for 3 days, then 7.5 mg (1.5 tabs) twice daily for 3 days, then 10 mg (2 tabs) twice daily for 3 days, then 12.5 mg (2.5 tabs) twice daily for 3 days, then 15 mg (3 tabs) twice daily and stay at that dose (Patient not taking: Reported on 2/8/2018) 150 tablet 0     clonazePAM (KLONOPIN) 0.5 MG tablet Take 0.5-1 tablets (0.25-0.5 mg) by mouth daily as needed for anxiety (Patient not taking: Reported on 2/8/2018) 10 tablet 0     Allergies   Allergen Reactions     Banana      Anaphylaxis     Latex      Anaphylaxis     Prilosec [Omeprazole  "Magnesium] Diarrhea       ROS:  Constitutional, neuro, ENT, endocrine, pulmonary, cardiac, gastrointestinal, genitourinary, musculoskeletal, integument and psychiatric systems are negative, except as otherwise noted.    This document serves as a record of the services and decisions personally performed and made by Rossi Ambrose DNP. It was created on her behalf by Kirstie Hernandez, a trained medical scribe. The creation of this document is based on the provider's statements to the medical scribe.  Kirstie Hernandez 9:54 AM February 8, 2018    OBJECTIVE:                                                    /62  Pulse 86  Temp 99.2  F (37.3  C) (Temporal)  Resp 14  Ht 1.676 m (5' 6\")  Wt 53.7 kg (118 lb 6.4 oz)  SpO2 100%  BMI 19.11 kg/m2  Body mass index is 19.11 kg/(m^2).  GENERAL APPEARANCE: healthy, alert and no distress  HENT: ear canals and TM's normal and nose and mouth without ulcers or lesions  NECK: no adenopathy, no asymmetry, masses, or scars and thyroid normal to palpation  RESP: lungs clear to auscultation - no rales, rhonchi or wheezes  CV: regular rates and rhythm, normal S1 S2, no S3 or S4 and no murmur, click or rub  ABDOMEN: ventral abdominal incision healing well, otherwise soft, nontender, without hepatosplenomegaly or masses and bowel sounds normal  MS: extremities normal- no gross deformities noted  SKIN: no suspicious lesions or rashes  NEURO: Normal strength and tone, mentation intact and speech normal  PSYCH: mentation appears normal and affect normal/bright    Diagnostic test results:  Diagnostic Test Results:  No results found for this or any previous visit (from the past 24 hour(s)).     ASSESSMENT/PLAN:                                                        ICD-10-CM    1. Crohn's disease of ileum with other complication (H) K50.018 FLUoxetine 20 MG tablet     CBC with platelets and differential     Ferritin     pantoprazole (PROTONIX) 40 MG EC tablet   2. Mild major depression (H) " F32.0 FLUoxetine 20 MG tablet   3. JUJU (generalized anxiety disorder) F41.1 hydrOXYzine (ATARAX) 25 MG tablet   4. Exposure to influenza Z20.828 oseltamivir (TAMIFLU) 75 MG capsule     Discussed recent hospital visit and procedures for Crohn's disease. Signed papers for medical leave and return to work restrictions. Refilled Fluoxetine 20mg and Protonix 40mg. Advised Patient to get through her post-op recovery time, and then see where her symptoms are. Directed Patient she can take Ibuprofen. Order placed for labs that the patient will complete today. Will notify with results.     Reviewed Depression and Anxiety symptoms. Will continue Fluoxetine 20mg for Depression and Hydroxyzine 25mg for Anxiety. Refilled Fluoxetine 20mg and Hydroxyzine 25mg. Patient states that she and her  are in counseling. Encouraged Patient to continue counseling. She feels cohesive.     Patient has been exposed to Influenza. Ordered Tamiflu for exposure. Medication direction, dosage, and side effects discussed with patient.    Follow up with Provider - Return to the clinic in 2 months for medication check.     All questions invited, asked and answered to the patient's apparent satisfaction.  Patient agrees to plan.      The information in this document, created by the medical scribe for me, accurately reflects the services I personally performed and the decisions made by me. I have reviewed and approved this document for accuracy prior to leaving the patient care area.  February 8, 2018 10:09 AM    FRANCESCA Jones [facility] WIL[facility]  Answers for HPI/ROS submitted by the patient on 2/8/2018   PHQ-2 Score: 2  If you checked off any problems, how difficult have these problems made it for you to do your work, take care of things at home, or get along with other people?: Somewhat difficult  PHQ9 TOTAL SCORE: 5  JUJU 7 TOTAL SCORE: 6

## 2018-02-07 NOTE — TELEPHONE ENCOUNTER
Reason for call:  Form  Reason for Call:  Form, our goal is to have forms completed with 72 hours, however, some forms may require a visit or additional information.    Type of letter, form or note:  Medical    Who is the form from?:  [facility]     Where did the form come from: form was faxed in    What clinic location was the form placed at?: [facility] - [phone]    Where the form was placed: 's in box     What number is listed as a contact on the form?: [phone]       Additional comments: Fax back to [phone]    Call taken on 2/7/2018 at 8:16 AM by Cory De La Rosa

## 2018-02-07 NOTE — TELEPHONE ENCOUNTER
Please look at form. Pt. Was advised that forms need office visits previously, if something more than signature is needed, please advise pt. To schedule OV. Rossi Ambrose

## 2018-02-08 ENCOUNTER — OFFICE VISIT (OUTPATIENT)
Dept: FAMILY MEDICINE | Facility: CLINIC | Age: 38
End: 2018-02-08
Payer: COMMERCIAL

## 2018-02-08 VITALS
OXYGEN SATURATION: 100 % | WEIGHT: 118.4 LBS | BODY MASS INDEX: 19.03 KG/M2 | SYSTOLIC BLOOD PRESSURE: 104 MMHG | DIASTOLIC BLOOD PRESSURE: 62 MMHG | RESPIRATION RATE: 14 BRPM | TEMPERATURE: 99.2 F | HEART RATE: 86 BPM | HEIGHT: 66 IN

## 2018-02-08 DIAGNOSIS — Z20.828 EXPOSURE TO INFLUENZA: ICD-10-CM

## 2018-02-08 DIAGNOSIS — F32.0 MILD MAJOR DEPRESSION (H): ICD-10-CM

## 2018-02-08 DIAGNOSIS — F41.1 GAD (GENERALIZED ANXIETY DISORDER): ICD-10-CM

## 2018-02-08 DIAGNOSIS — K50.018 CROHN'S DISEASE OF ILEUM WITH OTHER COMPLICATION (H): Primary | ICD-10-CM

## 2018-02-08 LAB
BASOPHILS # BLD AUTO: 0 10E9/L (ref 0–0.2)
BASOPHILS NFR BLD AUTO: 0.2 %
DIFFERENTIAL METHOD BLD: ABNORMAL
EOSINOPHIL # BLD AUTO: 0.2 10E9/L (ref 0–0.7)
EOSINOPHIL NFR BLD AUTO: 1.1 %
ERYTHROCYTE [DISTWIDTH] IN BLOOD BY AUTOMATED COUNT: 15.9 % (ref 10–15)
FERRITIN SERPL-MCNC: 19 NG/ML (ref 12–150)
HCT VFR BLD AUTO: 38.2 % (ref 35–47)
HGB BLD-MCNC: 11.5 G/DL (ref 11.7–15.7)
LYMPHOCYTES # BLD AUTO: 2.3 10E9/L (ref 0.8–5.3)
LYMPHOCYTES NFR BLD AUTO: 17.2 %
MCH RBC QN AUTO: 27.1 PG (ref 26.5–33)
MCHC RBC AUTO-ENTMCNC: 30.1 G/DL (ref 31.5–36.5)
MCV RBC AUTO: 90 FL (ref 78–100)
MONOCYTES # BLD AUTO: 0.8 10E9/L (ref 0–1.3)
MONOCYTES NFR BLD AUTO: 6.3 %
NEUTROPHILS # BLD AUTO: 9.8 10E9/L (ref 1.6–8.3)
NEUTROPHILS NFR BLD AUTO: 75.2 %
PLATELET # BLD AUTO: 328 10E9/L (ref 150–450)
RBC # BLD AUTO: 4.25 10E12/L (ref 3.8–5.2)
WBC # BLD AUTO: 13.1 10E9/L (ref 4–11)

## 2018-02-08 PROCEDURE — 85025 COMPLETE CBC W/AUTO DIFF WBC: CPT | Performed by: NURSE PRACTITIONER

## 2018-02-08 PROCEDURE — 99214 OFFICE O/P EST MOD 30 MIN: CPT | Performed by: NURSE PRACTITIONER

## 2018-02-08 PROCEDURE — 82728 ASSAY OF FERRITIN: CPT | Performed by: NURSE PRACTITIONER

## 2018-02-08 PROCEDURE — 36415 COLL VENOUS BLD VENIPUNCTURE: CPT | Performed by: NURSE PRACTITIONER

## 2018-02-08 RX ORDER — OSELTAMIVIR PHOSPHATE 75 MG/1
75 CAPSULE ORAL DAILY
Qty: 10 CAPSULE | Refills: 0 | Status: SHIPPED | OUTPATIENT
Start: 2018-02-08 | End: 2018-02-23

## 2018-02-08 RX ORDER — PREDNISONE 10 MG/1
TABLET ORAL
Refills: 0 | COMMUNITY
Start: 2018-01-19 | End: 2019-02-01

## 2018-02-08 RX ORDER — HYDROXYZINE HYDROCHLORIDE 25 MG/1
12.5-25 TABLET, FILM COATED ORAL EVERY 6 HOURS PRN
Qty: 20 TABLET | Refills: 1 | Status: SHIPPED | OUTPATIENT
Start: 2018-02-08 | End: 2019-04-08

## 2018-02-08 RX ORDER — FLUOXETINE 20 MG/1
40 TABLET, FILM COATED ORAL DAILY
Qty: 180 TABLET | Refills: 1 | Status: SHIPPED | OUTPATIENT
Start: 2018-02-08 | End: 2019-04-08

## 2018-02-08 RX ORDER — PANTOPRAZOLE SODIUM 40 MG/1
40 TABLET, DELAYED RELEASE ORAL DAILY
Refills: 0 | COMMUNITY
Start: 2018-01-19 | End: 2018-02-08

## 2018-02-08 RX ORDER — PANTOPRAZOLE SODIUM 40 MG/1
40 TABLET, DELAYED RELEASE ORAL DAILY
Qty: 90 TABLET | Refills: 0 | Status: SHIPPED | OUTPATIENT
Start: 2018-02-08 | End: 2019-03-19

## 2018-02-08 ASSESSMENT — PATIENT HEALTH QUESTIONNAIRE - PHQ9
SUM OF ALL RESPONSES TO PHQ QUESTIONS 1-9: 5
10. IF YOU CHECKED OFF ANY PROBLEMS, HOW DIFFICULT HAVE THESE PROBLEMS MADE IT FOR YOU TO DO YOUR WORK, TAKE CARE OF THINGS AT HOME, OR GET ALONG WITH OTHER PEOPLE: SOMEWHAT DIFFICULT
SUM OF ALL RESPONSES TO PHQ QUESTIONS 1-9: 5

## 2018-02-08 ASSESSMENT — ANXIETY QUESTIONNAIRES
5. BEING SO RESTLESS THAT IT IS HARD TO SIT STILL: SEVERAL DAYS
7. FEELING AFRAID AS IF SOMETHING AWFUL MIGHT HAPPEN: NOT AT ALL
GAD7 TOTAL SCORE: 6
GAD7 TOTAL SCORE: 6
3. WORRYING TOO MUCH ABOUT DIFFERENT THINGS: SEVERAL DAYS
6. BECOMING EASILY ANNOYED OR IRRITABLE: SEVERAL DAYS
7. FEELING AFRAID AS IF SOMETHING AWFUL MIGHT HAPPEN: NOT AT ALL
4. TROUBLE RELAXING: SEVERAL DAYS
2. NOT BEING ABLE TO STOP OR CONTROL WORRYING: SEVERAL DAYS
1. FEELING NERVOUS, ANXIOUS, OR ON EDGE: SEVERAL DAYS
GAD7 TOTAL SCORE: 6

## 2018-02-08 ASSESSMENT — PAIN SCALES - GENERAL: PAINLEVEL: MILD PAIN (2)

## 2018-02-08 NOTE — MR AVS SNAPSHOT
After Visit Summary   2/8/2018    Soo Timmons    MRN: [id number]           Patient Information     Date Of Birth          1980        Visit Information        Provider Department      2/8/2018 9:20 AM Rossi Ambrose APRN CNP [facility]        Today's Diagnoses     Crohn's disease of ileum with other complication (H)    -  1    Mild major depression (H)        JUJU (generalized anxiety disorder)        Exposure to influenza          Care Instructions    Refilled Fluoxetine 20mg and Protonix 40mg. Can take Ibuprofen. Will notify with results.    Refilled Fluoxetine 20mg and Hydroxyzine 25mg.    Start taking Tamiflu.                   Follow-ups after your visit        Follow-up notes from your care team     Return in about 2 months (around 4/8/2018) for Medication check.      Who to contact     If you have questions or need follow up information about today's clinic visit or your schedule please contact [facility] directly at [phone].  Normal or non-critical lab and imaging results will be communicated to you by [employer], letter or phone within 4 business days after the clinic has received the results. If you do not hear from us within 7 days, please contact the clinic through [employer] or phone. If you have a critical or abnormal lab result, we will notify you by phone as soon as possible.  Submit refill requests through [employer] or call your pharmacy and they will forward the refill request to us. Please allow 3 business days for your refill to be completed.          Additional Information About Your Visit        [employer] Information     [employer] gives you secure access to your electronic health record. If you see a primary care provider, you can also send messages to your care team and make appointments. If you have questions, please call your primary care clinic.  If you do not have a primary care provider, please call [phone] and they will assist you.       "  Care EveryWhere ID     This is your Care EveryWhere ID. This could be used by other organizations to access your [facility] medical records  [id number]        Your Vitals Were     Pulse Temperature Respirations Height Pulse Oximetry BMI (Body Mass Index)    86 99.2  F (37.3  C) (Temporal) 14 5' 6\" (1.676 m) 100% 19.11 kg/m2       Blood Pressure from Last 3 Encounters:   02/08/18 104/62   01/03/18 103/57   12/26/17 110/74    Weight from Last 3 Encounters:   02/08/18 118 lb 6.4 oz (53.7 kg)   01/02/18 115 lb 13 oz (52.5 kg)   12/26/17 121 lb (54.9 kg)              We Performed the Following     CBC with platelets and differential     Ferritin          Today's Medication Changes          These changes are accurate as of 2/8/18 11:59 PM.  If you have any questions, ask your nurse or doctor.               Start taking these medicines.        Dose/Directions    FLUoxetine 20 MG tablet   Used for:  Mild major depression (H), Crohn's disease of ileum with other complication (H)   Replaces:  FLUoxetine 40 MG capsule   Started by:  Rossi Ambrose APRN CNP        Dose:  40 mg   Take 2 tablets (40 mg) by mouth daily   Quantity:  180 tablet   Refills:  1       oseltamivir 75 MG capsule   Commonly known as:  TAMIFLU   Used for:  Exposure to influenza   Started by:  Rossi Ambrose APRN CNP        Dose:  75 mg   Take 1 capsule (75 mg) by mouth daily   Quantity:  10 capsule   Refills:  0         Stop taking these medicines if you haven't already. Please contact your care team if you have questions.     FLUoxetine 40 MG capsule   Commonly known as:  PROzac   Replaced by:  FLUoxetine 20 MG tablet   Stopped by:  Rossi Ambrose APRN CNP                Where to get your medicines      These medications were sent to [id number] - [place], [place] - [address]  [address] [address] [address]     Phone:  [phone]     FLUoxetine 20 MG tablet    hydrOXYzine 25 MG tablet    oseltamivir 75 MG capsule    pantoprazole " 40 MG EC tablet                Primary Care Provider Office Phone # Fax #    FRANCESCA Patiño [facility] [phone]       [address] [facility] [address]        Goals        General    Psychosocial I will devlope a safety plan (pt-stated)     Notes - Note created  11/5/2015  3:13 PM by Magdalena Warren LSW    As of today's date 11/5/2015 goal is met at 0 - 25%.   Goal Status:  Active      Psychosocial/ I will go to talk with Lalita Watters (pt-stated)     Notes - Note created  11/5/2015  3:12 PM by Magdalena Warren LSW    As of today's date 11/5/2015 goal is met at 0 - 25%.   Goal Status:  Active        Equal Access to Services     DELORES COBIAN : Hadii fidel andrade hadasho Soomaali, waaxda luqadaha, qaybta kaalmada adeegyada, hira idiin hayale palmer . So [facility] [phone].    ATENCIÓN: Si habla español, tiene a meier disposición servicios gratuitos de asistencia lingüística. Llame al [phone].    We comply with applicable federal civil rights laws and [place] laws. We do not discriminate on the basis of race, color, national origin, age, disability, sex, sexual orientation, or gender identity.            Thank you!     Thank you for choosing [facility]  for your care. Our goal is always to provide you with excellent care. Hearing back from our patients is one way we can continue to improve our services. Please take a few minutes to complete the written survey that you may receive in the mail after your visit with us. Thank you!             Your Updated Medication List - Protect others around you: Learn how to safely use, store and throw away your medicines at www.disposemymeds.org.          This list is accurate as of 2/8/18 11:59 PM.  Always use your most recent med list.                   Brand Name Dispense Instructions for use Diagnosis    busPIRone 5 MG tablet    BUSPAR    150 tablet    Start at 5 mg twice daily for 3 days, then 7.5 mg (1.5 tabs) twice daily  for 3 days, then 10 mg (2 tabs) twice daily for 3 days, then 12.5 mg (2.5 tabs) twice daily for 3 days, then 15 mg (3 tabs) twice daily and stay at that dose    JUJU (generalized anxiety disorder)       clonazePAM 0.5 MG tablet    klonoPIN    10 tablet    Take 0.5-1 tablets (0.25-0.5 mg) by mouth daily as needed for anxiety    JUJU (generalized anxiety disorder)       FLUoxetine 20 MG tablet     180 tablet    Take 2 tablets (40 mg) by mouth daily    Mild major depression (H), Crohn's disease of ileum with other complication (H)       hydrOXYzine 25 MG tablet    ATARAX    20 tablet    Take 0.5-1 tablets (12.5-25 mg) by mouth every 6 hours as needed for anxiety    JUJU (generalized anxiety disorder)       MIRENA (52 MG) 20 MCG/24HR IUD   Generic drug:  levonorgestrel     1 Intra Uterine Device    intrauterine uterine device placed    Menorrhagia, Well woman exam with routine gynecological exam, Insertion of IUD, Contraception       Multi-vitamin Tabs tablet      Take 1 tablet by mouth daily        oseltamivir 75 MG capsule    TAMIFLU    10 capsule    Take 1 capsule (75 mg) by mouth daily    Exposure to influenza       oxyCODONE IR 5 MG tablet    ROXICODONE    20 tablet    Take 0.5-1 tablets (2.5-5 mg) by mouth every 6 hours as needed for pain        pantoprazole 40 MG EC tablet    PROTONIX    90 tablet    Take 1 tablet (40 mg) by mouth daily    Crohn's disease of ileum with other complication (H)       predniSONE 10 MG tablet    DELTASONE     TAKE 4 TABLETS (40 MG) BY MOUTH DAILY FOR 5 DAYS THEN 3 TABLETS (30 MG) DAILY UNTIL GI CLINIC FOLLOW UP        PROBIOTIC ADVANCED PO           TYLENOL PO      Take 500 mg by mouth

## 2018-02-09 ASSESSMENT — PATIENT HEALTH QUESTIONNAIRE - PHQ9: SUM OF ALL RESPONSES TO PHQ QUESTIONS 1-9: 5

## 2018-02-09 ASSESSMENT — ANXIETY QUESTIONNAIRES: GAD7 TOTAL SCORE: 6

## 2018-02-15 ENCOUNTER — MYC MEDICAL ADVICE (OUTPATIENT)
Dept: FAMILY MEDICINE | Facility: CLINIC | Age: 38
End: 2018-02-15

## 2018-02-15 DIAGNOSIS — D50.9 IRON DEFICIENCY ANEMIA, UNSPECIFIED IRON DEFICIENCY ANEMIA TYPE: Primary | ICD-10-CM

## 2018-02-23 ENCOUNTER — OFFICE VISIT (OUTPATIENT)
Dept: FAMILY MEDICINE | Facility: CLINIC | Age: 38
End: 2018-02-23
Payer: COMMERCIAL

## 2018-02-23 VITALS
DIASTOLIC BLOOD PRESSURE: 64 MMHG | HEART RATE: 82 BPM | HEIGHT: 66 IN | BODY MASS INDEX: 19.77 KG/M2 | TEMPERATURE: 99.1 F | SYSTOLIC BLOOD PRESSURE: 106 MMHG | RESPIRATION RATE: 12 BRPM | WEIGHT: 123 LBS

## 2018-02-23 DIAGNOSIS — K22.2 ESOPHAGEAL STRICTURE: ICD-10-CM

## 2018-02-23 DIAGNOSIS — Z01.818 PREOP GENERAL PHYSICAL EXAM: Primary | ICD-10-CM

## 2018-02-23 DIAGNOSIS — K50.019 CROHN'S DISEASE OF SMALL INTESTINE WITH COMPLICATION (H): ICD-10-CM

## 2018-02-23 DIAGNOSIS — D50.9 IRON DEFICIENCY ANEMIA, UNSPECIFIED IRON DEFICIENCY ANEMIA TYPE: ICD-10-CM

## 2018-02-23 LAB
BASOPHILS # BLD AUTO: 0 10E9/L (ref 0–0.2)
BASOPHILS NFR BLD AUTO: 0.1 %
DIFFERENTIAL METHOD BLD: ABNORMAL
EOSINOPHIL # BLD AUTO: 0.2 10E9/L (ref 0–0.7)
EOSINOPHIL NFR BLD AUTO: 2 %
ERYTHROCYTE [DISTWIDTH] IN BLOOD BY AUTOMATED COUNT: 15.4 % (ref 10–15)
HCT VFR BLD AUTO: 38.6 % (ref 35–47)
HGB BLD-MCNC: 11.7 G/DL (ref 11.7–15.7)
LYMPHOCYTES # BLD AUTO: 2.3 10E9/L (ref 0.8–5.3)
LYMPHOCYTES NFR BLD AUTO: 20.3 %
MCH RBC QN AUTO: 26.7 PG (ref 26.5–33)
MCHC RBC AUTO-ENTMCNC: 30.3 G/DL (ref 31.5–36.5)
MCV RBC AUTO: 88 FL (ref 78–100)
MONOCYTES # BLD AUTO: 0.7 10E9/L (ref 0–1.3)
MONOCYTES NFR BLD AUTO: 6.1 %
NEUTROPHILS # BLD AUTO: 7.9 10E9/L (ref 1.6–8.3)
NEUTROPHILS NFR BLD AUTO: 71.5 %
PLATELET # BLD AUTO: 288 10E9/L (ref 150–450)
RBC # BLD AUTO: 4.38 10E12/L (ref 3.8–5.2)
WBC # BLD AUTO: 11.1 10E9/L (ref 4–11)

## 2018-02-23 PROCEDURE — 99214 OFFICE O/P EST MOD 30 MIN: CPT | Performed by: NURSE PRACTITIONER

## 2018-02-23 PROCEDURE — 85025 COMPLETE CBC W/AUTO DIFF WBC: CPT | Performed by: NURSE PRACTITIONER

## 2018-02-23 PROCEDURE — 36415 COLL VENOUS BLD VENIPUNCTURE: CPT | Performed by: NURSE PRACTITIONER

## 2018-02-23 ASSESSMENT — ANXIETY QUESTIONNAIRES
7. FEELING AFRAID AS IF SOMETHING AWFUL MIGHT HAPPEN: NOT AT ALL
4. TROUBLE RELAXING: NOT AT ALL
GAD7 TOTAL SCORE: 2
5. BEING SO RESTLESS THAT IT IS HARD TO SIT STILL: NOT AT ALL
GAD7 TOTAL SCORE: 2
1. FEELING NERVOUS, ANXIOUS, OR ON EDGE: SEVERAL DAYS
GAD7 TOTAL SCORE: 2
2. NOT BEING ABLE TO STOP OR CONTROL WORRYING: NOT AT ALL
6. BECOMING EASILY ANNOYED OR IRRITABLE: NOT AT ALL
3. WORRYING TOO MUCH ABOUT DIFFERENT THINGS: SEVERAL DAYS
7. FEELING AFRAID AS IF SOMETHING AWFUL MIGHT HAPPEN: NOT AT ALL

## 2018-02-23 ASSESSMENT — PATIENT HEALTH QUESTIONNAIRE - PHQ9
10. IF YOU CHECKED OFF ANY PROBLEMS, HOW DIFFICULT HAVE THESE PROBLEMS MADE IT FOR YOU TO DO YOUR WORK, TAKE CARE OF THINGS AT HOME, OR GET ALONG WITH OTHER PEOPLE: NOT DIFFICULT AT ALL
SUM OF ALL RESPONSES TO PHQ QUESTIONS 1-9: 1
SUM OF ALL RESPONSES TO PHQ QUESTIONS 1-9: 1

## 2018-02-23 ASSESSMENT — PAIN SCALES - GENERAL: PAINLEVEL: NO PAIN (0)

## 2018-02-23 NOTE — PROGRESS NOTES
[facility] [place]  [address], [address]  [place] [address]  [phone]  Dept: [phone]    PRE-OP EVALUATION:  Today's date: 2018    Soo Timmons (: 1980) presents for pre-operative evaluation assessment as requested by Dr. Hoang Rodriguez.  She requires evaluation and anesthesia risk assessment prior to undergoing Upper endoscopy surgery/procedure for treatment of rechecking esophageal strictures .    Fax number for surgical facility: [phone]  Primary Physician: Rossi Ambrose  Type of Anesthesia Anticipated: MAC    Patient has a Health Care Directive or Living Will:  NO    Preop Questions 2018   Who is doing your surgery? [facility]   What are you having done? upper endoscopy   Date of Surgery/Procedure:    Facility or Hospital where procedure/surgery will be performed: [facility]   1.  Do you have a history of Heart attack, stroke, stent, coronary bypass surgery, or other heart surgery? No   2.  Do you ever have any pain or discomfort in your chest? No   3.  Do you have a history of  Heart Failure? No   4.   Are you troubled by shortness of breath when:  walking on a level surface, or up a slight hill, or at night? No   5.  Do you currently have a cold, bronchitis or other respiratory infection? No   6.  Do you have a cough, shortness of breath, or wheezing? No   7.  Do you sometimes get pains in the calves of your legs when you walk? No   8. Do you or anyone in your family have previous history of blood clots? No   9.  Do you or does anyone in your family have a serious bleeding problem such as prolonged bleeding following surgeries or cuts? No   10. Have you ever had problems with anemia or been told to take iron pills? YES - current anemia   11. Have you had any abnormal blood loss such as black, tarry or bloody stools, or abnormal vaginal bleeding? No   12. Have you ever had a blood transfusion? No   13. Have you  or any of your relatives ever had problems with anesthesia? No   14. Do you have sleep apnea, excessive snoring or daytime drowsiness? No   15. Do you have any prosthetic heart valves? No   16. Do you have prosthetic joints? No   17. Is there any chance that you may be pregnant? No         HPI:     HPI related to upcoming procedure: had esophageal dilation with upper endoscopy in Jan. 2018. Exam for examination and possible dilation of esophageal strictures.   S/o partial colectomy Cory. 10/18 due to Crohn's colitis and SBO. Progressing well, nutrition has improved since surgery. Ferritin levels are low and is planning future hematology consult for iron infusions, but is currently tolerating MVI with iron, daily.   Is anticipating Entyvio infusions starting next week. On prednisone 5 mg daily.       See problem list for active medical problems.  Problems all longstanding and stable, except as noted/documented.  See ROS for pertinent symptoms related to these conditions.                                                                                                  .    MEDICAL HISTORY:     Patient Active Problem List    Diagnosis Date Noted     Health Care Home 08/19/2011     Priority: High       Status:  Closed  Care Coordinator:  NILSON VALDOVINOS    See Letters for HC Care Plan  Date:  December 22, 2015    Status:  Accepted  Care Coordinator:  NILSON VALDOVINOS    See Letters for HC Care Plan  Date:  November 5, 2015             Irritable bowel syndrome 10/02/2015     Priority: Medium     Vitamin B 12 deficiency 08/19/2015     Priority: Medium     Inflammatory bowel disease (Crohn's disease) (H) 11/17/2014     Priority: Medium     Iron deficiency anemia 11/17/2014     Priority: Medium     Malabsorption 03/28/2014     Priority: Medium     Crohn's ileitis (H) 01/10/2014     Priority: Medium     GERD (gastroesophageal reflux disease) 07/11/2013     Priority: Medium     Mild major depression (H) 01/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 Provider to review        Past Medical History:   Diagnosis Date     Depressive disorder 01/01/1997     [facility]     Fatigue 7/1/2010     Fatigue 7/1/2010     Fatigue      Inflammatory bowel disease (Crohn's disease) 11/17/2014     Inflammatory bowel disease (Crohn's disease) (H) 11/17/2014     Inflammatory bowel disease (Crohn's disease) (H)      Unspecified contraceptive management      Variants of migraine, not elsewhere classified, without mention of intractable migraine without mention of status migrainosus      Past Surgical History:   Procedure Laterality Date     COLONOSCOPY  10/04/06    [facility]      COLONOSCOPY  12/20/2013    Procedure: COMBINED COLONOSCOPY, SINGLE BIOPSY/POLYPECTOMY BY BIOPSY;;  Surgeon: Presley Ocampo MD;  Location:  OR      REMOVE INTRAUTERINE DEVICE  09/02/08      UGI ENDOSCOPY, SIMPLE EXAM  10/4/2006     LAPAROSCOPIC CHOLECYSTECTOMY  5/16/2011    Procedure:LAPAROSCOPIC CHOLECYSTECTOMY; Surgeon:LIYAH AVELAR; Location: OR     Current Outpatient Prescriptions   Medication Sig Dispense Refill     predniSONE (DELTASONE) 10 MG tablet TAKE 4 TABLETS (40 MG) BY MOUTH DAILY FOR 5 DAYS THEN 3 TABLETS (30 MG) DAILY UNTIL GI CLINIC FOLLOW UP  0     FLUoxetine 20 MG tablet Take 2 tablets (40 mg) by mouth daily 180 tablet 1     hydrOXYzine (ATARAX) 25 MG tablet Take 0.5-1 tablets (12.5-25 mg) by mouth every 6 hours as needed for anxiety 20  "tablet 1     pantoprazole (PROTONIX) 40 MG EC tablet Take 1 tablet (40 mg) by mouth daily 90 tablet 0     Acetaminophen (TYLENOL PO) Take 500 mg by mouth       oxyCODONE IR (ROXICODONE) 5 MG tablet Take 0.5-1 tablets (2.5-5 mg) by mouth every 6 hours as needed for pain 20 tablet 0     busPIRone (BUSPAR) 5 MG tablet Start at 5 mg twice daily for 3 days, then 7.5 mg (1.5 tabs) twice daily for 3 days, then 10 mg (2 tabs) twice daily for 3 days, then 12.5 mg (2.5 tabs) twice daily for 3 days, then 15 mg (3 tabs) twice daily and stay at that dose 150 tablet 0     Probiotic Product (PROBIOTIC ADVANCED PO)        clonazePAM (KLONOPIN) 0.5 MG tablet Take 0.5-1 tablets (0.25-0.5 mg) by mouth daily as needed for anxiety 10 tablet 0     multivitamin, therapeutic with minerals (MULTI-VITAMIN) TABS Take 1 tablet by mouth daily       MIRENA 20 MCG/24HR IU IUD intrauterine uterine device placed 1 Intra Uterine Device 0     OTC products: None, except as noted above    Allergies   Allergen Reactions     Cimzia [Certolizumab Pegol] Other (See Comments)     Redness and swelling @ injection site     Humira Other (See Comments)     Inflammation, redness and swelling @ injection site     Remicade [Infliximab] Swelling     Banana      Anaphylaxis     Latex      Anaphylaxis     Prilosec [Omeprazole Magnesium] Diarrhea      Latex Allergy: YES: Precautions to take: contact    Social History   Substance Use Topics     Smoking status: Never Smoker     Smokeless tobacco: Never Used     Alcohol use No      Comment: rare     History   Drug Use No       REVIEW OF SYSTEMS:   Constitutional, neuro, ENT, endocrine, pulmonary, cardiac, gastrointestinal, genitourinary, musculoskeletal, integument and psychiatric systems are negative, except as otherwise noted.    EXAM:   /64  Pulse 82  Temp 99.1  F (37.3  C) (Temporal)  Resp 12  Ht 5' 6\" (1.676 m)  Wt 123 lb (55.8 kg)  BMI 19.85 kg/m2    GENERAL APPEARANCE: healthy, alert and no distress    "  EYES: EOMI, PERRL     HENT: ear canals and TM's normal and nose and mouth without ulcers or lesions     NECK: no adenopathy, no asymmetry, masses, or scars and thyroid normal to palpation     RESP: lungs clear to auscultation - no rales, rhonchi or wheezes     CV: regular rates and rhythm, normal S1 S2, no S3 or S4 and no murmur, click or rub     ABDOMEN:  soft, nontender, no HSM or masses and bowel sounds normal     MS: extremities normal- no gross deformities noted, no evidence of inflammation in joints, FROM in all extremities.     SKIN: no suspicious lesions or rashes     NEURO: Normal strength and tone, sensory exam grossly normal, mentation intact and speech normal     PSYCH: mentation appears normal. and affect normal/bright    DIAGNOSTICS:   Hemoglobin (indicated for history of anemia or procedure with significant blood loss such as tonsillectomy, major intraperitoneal surgery, vascular surgery, major spine surgery, total joint replacement)    Recent Labs   Lab Test  02/08/18   1012  01/02/18   2147  12/26/17   1910   HGB  11.5*  12.2  13.7   PLT  328  367  456*   NA   --   143  141   POTASSIUM   --   3.4  4.0   CR   --   0.90  0.80        IMPRESSION:   Reason for surgery/procedure: esophageal strictures  Diagnosis/reason for consult: pre-op clearance, prednisone use- daily with Crohn's, Anemia    The proposed surgical procedure is considered LOW risk.    REVISED CARDIAC RISK INDEX  The patient has the following serious cardiovascular risks for perioperative complications such as (MI, PE, VFib and 3  AV Block):  No serious cardiac risks  INTERPRETATION: 0 risks: Class I (very low risk - 0.4% complication rate)    The patient has the following additional risks for perioperative complications:  No identified additional risks      ICD-10-CM    1. Preop general physical exam Z01.818 CBC with platelets and differential     CANCELED: Hemoglobin   2. Esophageal stricture K22.2 CBC with platelets and differential      CANCELED: Hemoglobin   3. Iron deficiency anemia, unspecified iron deficiency anemia type D50.9 CBC with platelets and differential     CANCELED: Hemoglobin   4. Crohn's disease of small intestine with complication (H) K50.019 CBC with platelets and differential       RECOMMENDATIONS:       Anemia  Anemia and does not require treatment prior to surgery.  Monitor Hemoglobin postoperatively.      --Patient is to take all scheduled medications on the day of surgery EXCEPT for modifications listed below.    Chronic Corticosteroid Use  Moderate procedure:   Methylprednisolone 10-15 mg IV on day of surgery.  Taper to baseline preoperative dose over the subsequent 1-2 days.      APPROVAL GIVEN to proceed with proposed procedure, without further diagnostic evaluation       Signed Electronically by: FRANCESCA Jones CNP    Copy of this evaluation report is provided to requesting physician.    [facility] Preop Guidelines  Answers for HPI/ROS submitted by the patient on 2/23/2018   If you checked off any problems, how difficult have these problems made it for you to do your work, take care of things at home, or get along with other people?: Not difficult at all  PHQ9 TOTAL SCORE: 1  JUJU 7 TOTAL SCORE: 2

## 2018-02-23 NOTE — MR AVS SNAPSHOT
After Visit Summary   2/23/2018    Soo Timmons    MRN: [id number]           Patient Information     Date Of Birth          1980        Visit Information        Provider Department      2/23/2018 11:20 AM Rossi Ambrose APRN [facility]        Today's Diagnoses     Preop general physical exam    -  1    Esophageal stricture        Iron deficiency anemia, unspecified iron deficiency anemia type        Crohn's disease of small intestine with complication (H)          Care Instructions      Before Your Surgery      Call your surgeon if there is any change in your health. This includes signs of a cold or flu (such as a sore throat, runny nose, cough, rash or fever).    Do not smoke, drink alcohol or take over the counter medicine (unless your surgeon or primary care doctor tells you to) for the 24 hours before and after surgery.    If you take prescribed drugs: Follow your doctor s orders about which medicines to take and which to stop until after surgery.    Eating and drinking prior to surgery: follow the instructions from your surgeon    Take a shower or bath the night before surgery. Use the soap your surgeon gave you to gently clean your skin. If you do not have soap from your surgeon, use your regular soap. Do not shave or scrub the surgery site.  Wear clean pajamas and have clean sheets on your bed.           Follow-ups after your visit        Your next 10 appointments already scheduled     Mar 12, 2018  1:00 PM CDT   Level 3 with NL INFUSION CHAIR 1   [facility] ([facility])    [address] [place] [address]   [phone]            Apr 09, 2018  1:00 PM CDT   Level 3 with NL INFUSION CHAIR 1   [facility])    [address] [place] [address]   [phone]              Who to contact     If you have questions or need follow up information about today's  "clinic visit or your schedule please contact [facility] [place] directly at [phone].  Normal or non-critical lab and imaging results will be communicated to you by MyChart, letter or phone within 4 business days after the clinic has received the results. If you do not hear from us within 7 days, please contact the clinic through [employer] or phone. If you have a critical or abnormal lab result, we will notify you by phone as soon as possible.  Submit refill requests through [email or web address] or call your pharmacy and they will forward the refill request to us. Please allow 3 business days for your refill to be completed.          Additional Information About Your Visit        [employer] Information     [email or web address] gives you secure access to your electronic health record. If you see a primary care provider, you can also send messages to your care team and make appointments. If you have questions, please call your primary care clinic.  If you do not have a primary care provider, please call [phone] and they will assist you.        Care EveryWhere ID     This is your Care EveryWhere ID. This could be used by other organizations to access your [facility] medical records  [id number]        Your Vitals Were     Pulse Temperature Respirations Height BMI (Body Mass Index)       82 99.1  F (37.3  C) (Temporal) 12 5' 6\" (1.676 m) 19.85 kg/m2        Blood Pressure from Last 3 Encounters:   02/26/18 125/70   02/23/18 106/64   02/08/18 104/62    Weight from Last 3 Encounters:   02/26/18 126 lb 9.6 oz (57.4 kg)   02/23/18 123 lb (55.8 kg)   02/08/18 118 lb 6.4 oz (53.7 kg)              We Performed the Following     CBC with platelets and differential        Primary Care Provider Office Phone # Fax #    FRANCESCA Patiño [facility] [phone]       [address]  [place] [address]        Goals        General    Psychosocial I will devlope a safety plan (pt-stated)     Notes - Note created  11/5/2015  3:13 PM by Kelvin " MECHE Bingham    As of today's date 11/5/2015 goal is met at 0 - 25%.   Goal Status:  Active      Psychosocial/ I will go to talk with Lalita Watters (pt-stated)     Notes - Note created  11/5/2015  3:12 PM by Magdalena Warren LSW    As of today's date 11/5/2015 goal is met at 0 - 25%.   Goal Status:  Active        Equal Access to Services     [facility]: Hadii aad ku hadasho Soomaali, waaxda luqadaha, qaybta kaalmada adeegyada, waxay idiin hayaan adeeg kharash la'aan . So [facility] [phone].    ATENCIÓN: Si habla español, tiene a meier disposición servicios gratuitos de asistencia lingüística. Llame al [phone].    We comply with applicable federal civil rights laws and [place] laws. We do not discriminate on the basis of race, color, national origin, age, disability, sex, sexual orientation, or gender identity.            Thank you!     Thank you for choosing [facility]  for your care. Our goal is always to provide you with excellent care. Hearing back from our patients is one way we can continue to improve our services. Please take a few minutes to complete the written survey that you may receive in the mail after your visit with us. Thank you!             Your Updated Medication List - Protect others around you: Learn how to safely use, store and throw away your medicines at www.disposemymeds.org.          This list is accurate as of 2/23/18 11:59 PM.  Always use your most recent med list.                   Brand Name Dispense Instructions for use Diagnosis    busPIRone 5 MG tablet    BUSPAR    150 tablet    Start at 5 mg twice daily for 3 days, then 7.5 mg (1.5 tabs) twice daily for 3 days, then 10 mg (2 tabs) twice daily for 3 days, then 12.5 mg (2.5 tabs) twice daily for 3 days, then 15 mg (3 tabs) twice daily and stay at that dose    JUJU (generalized anxiety disorder)       clonazePAM 0.5 MG tablet    klonoPIN    10 tablet    Take 0.5-1 tablets (0.25-0.5 mg) by mouth daily as needed  for anxiety    JUJU (generalized anxiety disorder)       FLUoxetine 20 MG tablet     180 tablet    Take 2 tablets (40 mg) by mouth daily    Mild major depression (H), Crohn's disease of ileum with other complication (H)       hydrOXYzine 25 MG tablet    ATARAX    20 tablet    Take 0.5-1 tablets (12.5-25 mg) by mouth every 6 hours as needed for anxiety    JUJU (generalized anxiety disorder)       MIRENA (52 MG) 20 MCG/24HR IUD   Generic drug:  levonorgestrel     1 Intra Uterine Device    intrauterine uterine device placed    Menorrhagia, Well woman exam with routine gynecological exam, Insertion of IUD, Contraception       Multi-vitamin Tabs tablet      Take 1 tablet by mouth daily        oxyCODONE IR 5 MG tablet    ROXICODONE    20 tablet    Take 0.5-1 tablets (2.5-5 mg) by mouth every 6 hours as needed for pain        pantoprazole 40 MG EC tablet    PROTONIX    90 tablet    Take 1 tablet (40 mg) by mouth daily    Crohn's disease of ileum with other complication (H)       predniSONE 10 MG tablet    DELTASONE     TAKE 4 TABLETS (40 MG) BY MOUTH DAILY FOR 5 DAYS THEN 3 TABLETS (30 MG) DAILY UNTIL GI CLINIC FOLLOW UP        PROBIOTIC ADVANCED PO           TYLENOL PO      Take 500 mg by mouth

## 2018-02-24 ASSESSMENT — PATIENT HEALTH QUESTIONNAIRE - PHQ9: SUM OF ALL RESPONSES TO PHQ QUESTIONS 1-9: 1

## 2018-02-24 ASSESSMENT — ANXIETY QUESTIONNAIRES: GAD7 TOTAL SCORE: 2

## 2018-02-26 ENCOUNTER — INFUSION THERAPY VISIT (OUTPATIENT)
Dept: INFUSION THERAPY | Facility: CLINIC | Age: 38
End: 2018-02-26
Attending: NURSE PRACTITIONER
Payer: COMMERCIAL

## 2018-02-26 VITALS
DIASTOLIC BLOOD PRESSURE: 70 MMHG | RESPIRATION RATE: 16 BRPM | HEART RATE: 92 BPM | WEIGHT: 126.6 LBS | TEMPERATURE: 98.1 F | BODY MASS INDEX: 20.43 KG/M2 | SYSTOLIC BLOOD PRESSURE: 125 MMHG

## 2018-02-26 DIAGNOSIS — K50.00 CROHN'S ILEITIS (H): ICD-10-CM

## 2018-02-26 DIAGNOSIS — K50.90 INFLAMMATORY BOWEL DISEASE (CROHN'S DISEASE) (H): Primary | ICD-10-CM

## 2018-02-26 LAB
ALBUMIN SERPL-MCNC: 3.6 G/DL (ref 3.4–5)
ALP SERPL-CCNC: 58 U/L (ref 40–150)
ALT SERPL W P-5'-P-CCNC: 30 U/L (ref 0–50)
AST SERPL W P-5'-P-CCNC: 13 U/L (ref 0–45)
BILIRUB DIRECT SERPL-MCNC: <0.1 MG/DL (ref 0–0.2)
BILIRUB SERPL-MCNC: 0.3 MG/DL (ref 0.2–1.3)
CRP SERPL-MCNC: <2.9 MG/L (ref 0–8)
ERYTHROCYTE [SEDIMENTATION RATE] IN BLOOD BY WESTERGREN METHOD: 5 MM/H (ref 0–20)
PROT SERPL-MCNC: 6.4 G/DL (ref 6.8–8.8)

## 2018-02-26 PROCEDURE — 36415 COLL VENOUS BLD VENIPUNCTURE: CPT | Performed by: NURSE PRACTITIONER

## 2018-02-26 PROCEDURE — 85652 RBC SED RATE AUTOMATED: CPT | Performed by: NURSE PRACTITIONER

## 2018-02-26 PROCEDURE — 96413 CHEMO IV INFUSION 1 HR: CPT

## 2018-02-26 PROCEDURE — 25000128 H RX IP 250 OP 636: Performed by: FAMILY MEDICINE

## 2018-02-26 PROCEDURE — 80076 HEPATIC FUNCTION PANEL: CPT | Performed by: NURSE PRACTITIONER

## 2018-02-26 PROCEDURE — 86140 C-REACTIVE PROTEIN: CPT | Performed by: NURSE PRACTITIONER

## 2018-02-26 RX ADMIN — SODIUM CHLORIDE 250 ML: 9 INJECTION, SOLUTION INTRAVENOUS at 13:40

## 2018-02-26 RX ADMIN — VEDOLIZUMAB 300 MG: 300 INJECTION, POWDER, LYOPHILIZED, FOR SOLUTION INTRAVENOUS at 13:41

## 2018-02-26 ASSESSMENT — PAIN SCALES - GENERAL: PAINLEVEL: NO PAIN (0)

## 2018-02-26 NOTE — PATIENT INSTRUCTIONS
Pt to return on 3/12 for Entyvio. Copies of medication list and upcoming appointments given prior to discharge.

## 2018-02-26 NOTE — MR AVS SNAPSHOT
After Visit Summary   2/26/2018    Soo Timmons    MRN: [id number]           Patient Information     Date Of Birth          1980        Visit Information        Provider Department      2/26/2018 1:00 PM NL INFUSION CHAIR 5 [facility]        Today's Diagnoses     Inflammatory bowel disease (Crohn's disease) (H)    -  1    Crohn's ileitis (H)          Care Instructions    Pt to return on 3/12 for Entyvio. Copies of medication list and upcoming appointments given prior to discharge.           Follow-ups after your visit        Your next 10 appointments already scheduled     Mar 12, 2018  1:00 PM CDT   Level 3 with NL INFUSION CHAIR 1   [facility] ([facility])    [address] [place] [id number] [phone]            Apr 09, 2018  1:00 PM CDT   Level 3 with NL INFUSION CHAIR 1   [facility] ([facility])    [address] [place] [id number] [phone]              Who to contact     If you have questions or need follow up information about today's clinic visit or your schedule please contact [facility] IN[facility] directly at [phone].  Normal or non-critical lab and imaging results will be communicated to you by iListhart, letter or phone within 4 business days after the clinic has received the results. If you do not hear from us within 7 days, please contact the clinic through iListhart or phone. If you have a critical or abnormal lab result, we will notify you by phone as soon as possible.  Submit refill requests through [employer] or call your pharmacy and they will forward the refill request to us. Please allow 3 business days for your refill to be completed.          Additional Information About Your Visit        MyChart Information     [employer] gives you secure access to your electronic health record. If you see a primary care provider, you can  also send messages to your care team and make appointments. If you have questions, please call your primary care clinic.  If you do not have a primary care provider, please call [phone] and they will assist you.        Care EveryWhere ID     This is your Care EveryWhere ID. This could be used by other organizations to access your [place] medical records  [id number]        Your Vitals Were     Pulse Temperature Respirations BMI (Body Mass Index)          92 98.1  F (36.7  C) (Temporal) 16 20.43 kg/m2         Blood Pressure from Last 3 Encounters:   02/26/18 125/70   02/23/18 106/64   02/08/18 104/62    Weight from Last 3 Encounters:   02/26/18 57.4 kg (126 lb 9.6 oz)   02/23/18 55.8 kg (123 lb)   02/08/18 53.7 kg (118 lb 6.4 oz)              We Performed the Following     CRP inflammation     Erythrocyte sedimentation rate auto     Hepatic panel        Primary Care Provider Office Phone # Fax #    RossiFRANCESCA Trejo [facility] [phone]       [address] [facility] [address]        Goals        General    Psychosocial I will devlope a safety plan (pt-stated)     Notes - Note created  11/5/2015  3:13 PM by Magdalena Warren LSW    As of today's date 11/5/2015 goal is met at 0 - 25%.   Goal Status:  Active      Psychosocial/ I will go to talk with Lalita Watters (pt-stated)     Notes - Note created  11/5/2015  3:12 PM by Magdalena Warren LSW    As of today's date 11/5/2015 goal is met at 0 - 25%.   Goal Status:  Active        Equal Access to Services     JASMINE COBIAN AH: Hadii fidel Dunlap, waalejada luqadaha, qaybta kaalhira chavez. So [facility] [phone].    ATENCIÓN: Si habla español, tiene a meier disposición servicios gratuitos de asistencia lingüística. Llame al [phone].    We comply with applicable federal civil rights laws and [place] laws. We do not discriminate on the basis of race, color, national origin, age, disability,  sex, sexual orientation, or gender identity.            Thank you!     Thank you for choosing [facility]  for your care. Our goal is always to provide you with excellent care. Hearing back from our patients is one way we can continue to improve our services. Please take a few minutes to complete the written survey that you may receive in the mail after your visit with us. Thank you!             Your Updated Medication List - Protect others around you: Learn how to safely use, store and throw away your medicines at www.disposemymeds.org.          This list is accurate as of 2/26/18  3:32 PM.  Always use your most recent med list.                   Brand Name Dispense Instructions for use Diagnosis    busPIRone 5 MG tablet    BUSPAR    150 tablet    Start at 5 mg twice daily for 3 days, then 7.5 mg (1.5 tabs) twice daily for 3 days, then 10 mg (2 tabs) twice daily for 3 days, then 12.5 mg (2.5 tabs) twice daily for 3 days, then 15 mg (3 tabs) twice daily and stay at that dose    JUJU (generalized anxiety disorder)       FLUoxetine 20 MG tablet     180 tablet    Take 2 tablets (40 mg) by mouth daily    Mild major depression (H), Crohn's disease of ileum with other complication (H)       hydrOXYzine 25 MG tablet    ATARAX    20 tablet    Take 0.5-1 tablets (12.5-25 mg) by mouth every 6 hours as needed for anxiety    JUJU (generalized anxiety disorder)       MIRENA (52 MG) 20 MCG/24HR IUD   Generic drug:  levonorgestrel     1 Intra Uterine Device    intrauterine uterine device placed    Menorrhagia, Well woman exam with routine gynecological exam, Insertion of IUD, Contraception       Multi-vitamin Tabs tablet      Take 1 tablet by mouth daily        oxyCODONE IR 5 MG tablet    ROXICODONE    20 tablet    Take 0.5-1 tablets (2.5-5 mg) by mouth every 6 hours as needed for pain        pantoprazole 40 MG EC tablet    PROTONIX    90 tablet    Take 1 tablet (40 mg) by mouth daily    Crohn's disease of  ileum with other complication (H)       predniSONE 10 MG tablet    DELTASONE     TAKE 4 TABLETS (40 MG) BY MOUTH DAILY FOR 5 DAYS THEN 3 TABLETS (30 MG) DAILY UNTIL GI CLINIC FOLLOW UP        PROBIOTIC ADVANCED PO           TYLENOL PO      Take 500 mg by mouth

## 2018-02-26 NOTE — PROGRESS NOTES
Pt here for IV Entyvio, initial dose, tolerated well.  Pt observed x 15 mins post infusion and discharged in stable condition.

## 2018-03-07 ENCOUNTER — TELEPHONE (OUTPATIENT)
Dept: INFUSION THERAPY | Facility: CLINIC | Age: 38
End: 2018-03-07

## 2018-03-12 ENCOUNTER — INFUSION THERAPY VISIT (OUTPATIENT)
Dept: INFUSION THERAPY | Facility: CLINIC | Age: 38
End: 2018-03-12
Attending: NURSE PRACTITIONER
Payer: COMMERCIAL

## 2018-03-12 VITALS
DIASTOLIC BLOOD PRESSURE: 75 MMHG | SYSTOLIC BLOOD PRESSURE: 136 MMHG | TEMPERATURE: 98.9 F | HEART RATE: 104 BPM | RESPIRATION RATE: 16 BRPM | OXYGEN SATURATION: 99 %

## 2018-03-12 DIAGNOSIS — K50.90 INFLAMMATORY BOWEL DISEASE (CROHN'S DISEASE) (H): Primary | ICD-10-CM

## 2018-03-12 LAB
ALBUMIN SERPL-MCNC: 3.7 G/DL (ref 3.4–5)
ALP SERPL-CCNC: 62 U/L (ref 40–150)
ALT SERPL W P-5'-P-CCNC: 22 U/L (ref 0–50)
AST SERPL W P-5'-P-CCNC: 9 U/L (ref 0–45)
BASOPHILS # BLD AUTO: 0.1 10E9/L (ref 0–0.2)
BASOPHILS NFR BLD AUTO: 0.4 %
BILIRUB DIRECT SERPL-MCNC: 0.1 MG/DL (ref 0–0.2)
BILIRUB SERPL-MCNC: 0.4 MG/DL (ref 0.2–1.3)
CRP SERPL-MCNC: <2.9 MG/L (ref 0–8)
DIFFERENTIAL METHOD BLD: ABNORMAL
EOSINOPHIL # BLD AUTO: 0.4 10E9/L (ref 0–0.7)
EOSINOPHIL NFR BLD AUTO: 2.8 %
ERYTHROCYTE [DISTWIDTH] IN BLOOD BY AUTOMATED COUNT: 15.8 % (ref 10–15)
ERYTHROCYTE [SEDIMENTATION RATE] IN BLOOD BY WESTERGREN METHOD: 4 MM/H (ref 0–20)
HCT VFR BLD AUTO: 42.7 % (ref 35–47)
HGB BLD-MCNC: 12.4 G/DL (ref 11.7–15.7)
IMM GRANULOCYTES # BLD: 0.1 10E9/L (ref 0–0.4)
IMM GRANULOCYTES NFR BLD: 1 %
LYMPHOCYTES # BLD AUTO: 3 10E9/L (ref 0.8–5.3)
LYMPHOCYTES NFR BLD AUTO: 21.6 %
MCH RBC QN AUTO: 25.7 PG (ref 26.5–33)
MCHC RBC AUTO-ENTMCNC: 29 G/DL (ref 31.5–36.5)
MCV RBC AUTO: 88 FL (ref 78–100)
MONOCYTES # BLD AUTO: 0.8 10E9/L (ref 0–1.3)
MONOCYTES NFR BLD AUTO: 5.6 %
NEUTROPHILS # BLD AUTO: 9.5 10E9/L (ref 1.6–8.3)
NEUTROPHILS NFR BLD AUTO: 68.6 %
PLATELET # BLD AUTO: 345 10E9/L (ref 150–450)
PROT SERPL-MCNC: 6.7 G/DL (ref 6.8–8.8)
RBC # BLD AUTO: 4.83 10E12/L (ref 3.8–5.2)
WBC # BLD AUTO: 13.8 10E9/L (ref 4–11)

## 2018-03-12 PROCEDURE — 40000268 ZZH STATISTIC NO CHARGES

## 2018-03-12 PROCEDURE — 80076 HEPATIC FUNCTION PANEL: CPT | Performed by: NURSE PRACTITIONER

## 2018-03-12 PROCEDURE — 86140 C-REACTIVE PROTEIN: CPT | Performed by: NURSE PRACTITIONER

## 2018-03-12 PROCEDURE — 36415 COLL VENOUS BLD VENIPUNCTURE: CPT | Performed by: NURSE PRACTITIONER

## 2018-03-12 PROCEDURE — 85025 COMPLETE CBC W/AUTO DIFF WBC: CPT | Performed by: NURSE PRACTITIONER

## 2018-03-12 PROCEDURE — 85652 RBC SED RATE AUTOMATED: CPT | Performed by: NURSE PRACTITIONER

## 2018-03-12 ASSESSMENT — PAIN SCALES - GENERAL: PAINLEVEL: NO PAIN (0)

## 2018-03-12 NOTE — PATIENT INSTRUCTIONS
Pt to f/u with primary provider for c/o symptoms expressed in Infusion and to r/o any infection prior to further dosing of Entyvio.

## 2018-03-12 NOTE — MR AVS SNAPSHOT
After Visit Summary   3/12/2018    Soo Timmons    MRN: [id number]           Patient Information     Date Of Birth          1980        Visit Information        Provider Department      3/12/2018 1:00 PM NL INFUSION CHAIR 1 [facility]        Today's Diagnoses     Inflammatory bowel disease (Crohn's disease) (H)    -  1      Care Instructions    Pt to f/u with primary provider for c/o symptoms expressed in Infusion and to r/o any infection prior to further dosing of Entyvio.           Follow-ups after your visit        Your next 10 appointments already scheduled     Mar 13, 2018  4:00 PM CDT   SHORT with FRANCESCA Patiño CNP   [facility] ([facility])    [address], [address]  [place] [id number] [phone]            Apr 09, 2018  1:00 PM CDT   Level 3 with NL INFUSION CHAIR 1   [facility] ([facility])    [address] [place] [address] [phone]              Who to contact     If you have questions or need follow up information about today's clinic visit or your schedule please contact [facility] INFUSION SERVICES directly at [phone].  Normal or non-critical lab and imaging results will be communicated to you by MyChart, letter or phone within 4 business days after the clinic has received the results. If you do not hear from us within 7 days, please contact the clinic through [employer]hart or phone. If you have a critical or abnormal lab result, we will notify you by phone as soon as possible.  Submit refill requests through [employer] or call your pharmacy and they will forward the refill request to us. Please allow 3 business days for your refill to be completed.          Additional Information About Your Visit        [employer]hart Information     [employer] gives you secure access to your electronic health record. If you see a primary care provider, you can also send  messages to your care team and make appointments. If you have questions, please call your primary care clinic.  If you do not have a primary care provider, please call [phone] and they will assist you.        Care EveryWhere ID     This is your Care EveryWhere ID. This could be used by other organizations to access your [place] medical records  [id number]        Your Vitals Were     Pulse Temperature Respirations Pulse Oximetry          104 98.9  F (37.2  C) (Temporal) 16 99%         Blood Pressure from Last 3 Encounters:   03/12/18 136/75   02/26/18 125/70   02/23/18 106/64    Weight from Last 3 Encounters:   02/26/18 57.4 kg (126 lb 9.6 oz)   02/23/18 55.8 kg (123 lb)   02/08/18 53.7 kg (118 lb 6.4 oz)              We Performed the Following     CBC with platelets differential     CRP inflammation     Erythrocyte sedimentation rate auto     Hepatic panel        Primary Care Provider Office Phone # Fax #    Rossi FRANCESCA Wiseman [facility] [phone] [address] [facility] [address]        Goals        General    Psychosocial I will devlope a safety plan (pt-stated)     Notes - Note created  11/5/2015  3:13 PM by Magdalena Warren LSW    As of today's date 11/5/2015 goal is met at 0 - 25%.   Goal Status:  Active      Psychosocial/ I will go to talk with Lalita Watters (pt-stated)     Notes - Note created  11/5/2015  3:12 PM by Magdalena Warren LSW    As of today's date 11/5/2015 goal is met at 0 - 25%.   Goal Status:  Active        Equal Access to Services     [facility] : Hadii fidel andrade hadasho Sosusan, waaxda luqadaha, qaybta kaalmada hira peña. So [facility] [phone].    ATENCIÓN: Si habla español, tiene a meier disposición servicios gratuitos de asistencia lingüística. Llame al [phone].    We comply with applicable federal civil rights laws and [place] laws. We do not discriminate on the basis of race, color, national origin, age,  disability, sex, sexual orientation, or gender identity.            Thank you!     Thank you for choosing [facility]  for your care. Our goal is always to provide you with excellent care. Hearing back from our patients is one way we can continue to improve our services. Please take a few minutes to complete the written survey that you may receive in the mail after your visit with us. Thank you!             Your Updated Medication List - Protect others around you: Learn how to safely use, store and throw away your medicines at www.disposemymeds.org.          This list is accurate as of 3/12/18  3:06 PM.  Always use your most recent med list.                   Brand Name Dispense Instructions for use Diagnosis    busPIRone 5 MG tablet    BUSPAR    150 tablet    Start at 5 mg twice daily for 3 days, then 7.5 mg (1.5 tabs) twice daily for 3 days, then 10 mg (2 tabs) twice daily for 3 days, then 12.5 mg (2.5 tabs) twice daily for 3 days, then 15 mg (3 tabs) twice daily and stay at that dose    JUJU (generalized anxiety disorder)       FLUoxetine 20 MG tablet     180 tablet    Take 2 tablets (40 mg) by mouth daily    Mild major depression (H), Crohn's disease of ileum with other complication (H)       hydrOXYzine 25 MG tablet    ATARAX    20 tablet    Take 0.5-1 tablets (12.5-25 mg) by mouth every 6 hours as needed for anxiety    JUJU (generalized anxiety disorder)       MIRENA (52 MG) 20 MCG/24HR IUD   Generic drug:  levonorgestrel     1 Intra Uterine Device    intrauterine uterine device placed    Menorrhagia, Well woman exam with routine gynecological exam, Insertion of IUD, Contraception       Multi-vitamin Tabs tablet      Take 1 tablet by mouth daily        oxyCODONE IR 5 MG tablet    ROXICODONE    20 tablet    Take 0.5-1 tablets (2.5-5 mg) by mouth every 6 hours as needed for pain        pantoprazole 40 MG EC tablet    PROTONIX    90 tablet    Take 1 tablet (40 mg) by mouth daily    Crohn's  disease of ileum with other complication (H)       predniSONE 10 MG tablet    DELTASONE     TAKE 4 TABLETS (40 MG) BY MOUTH DAILY FOR 5 DAYS THEN 3 TABLETS (30 MG) DAILY UNTIL GI CLINIC FOLLOW UP        PROBIOTIC ADVANCED PO           TYLENOL PO      Take 500 mg by mouth

## 2018-03-12 NOTE — PROGRESS NOTES
Pt here for Entyvio.  Pt c/o multiple symptoms - painful joints/ muscles starting about 24 hours after first dose of Entyvio (knees, shoulders, fingers).  Cough started about 1 week ago, mostly at night.  Night sweats 2-3 /night, needing to get up and change clothes and bedding. Bruising.  Decreased appetite, mouth and nose sores, ringing in ears w/ dizziness, constipation.  Jolie URBINA notified of above symptoms.  Recommendation made to hold Entyvio dose today and have pt see primary provider prior to subsequent dosing of Entyvio to r/o any infection. Pt discharged in stable condition.

## 2018-03-13 ENCOUNTER — OFFICE VISIT (OUTPATIENT)
Dept: FAMILY MEDICINE | Facility: CLINIC | Age: 38
End: 2018-03-13
Payer: COMMERCIAL

## 2018-03-13 VITALS
BODY MASS INDEX: 21.1 KG/M2 | OXYGEN SATURATION: 99 % | DIASTOLIC BLOOD PRESSURE: 64 MMHG | HEIGHT: 66 IN | TEMPERATURE: 98 F | SYSTOLIC BLOOD PRESSURE: 100 MMHG | RESPIRATION RATE: 16 BRPM | WEIGHT: 131.3 LBS | HEART RATE: 93 BPM

## 2018-03-13 DIAGNOSIS — T45.1X5A IMMUNOSUPPRESSED DUE TO CHEMOTHERAPY (H): ICD-10-CM

## 2018-03-13 DIAGNOSIS — Z79.899 IMMUNOSUPPRESSED DUE TO CHEMOTHERAPY (H): ICD-10-CM

## 2018-03-13 DIAGNOSIS — R05.9 COUGH: Primary | ICD-10-CM

## 2018-03-13 DIAGNOSIS — K50.019 CROHN'S DISEASE OF ILEUM WITH COMPLICATION (H): ICD-10-CM

## 2018-03-13 DIAGNOSIS — D84.821 IMMUNOSUPPRESSED DUE TO CHEMOTHERAPY (H): ICD-10-CM

## 2018-03-13 PROCEDURE — 99214 OFFICE O/P EST MOD 30 MIN: CPT | Performed by: NURSE PRACTITIONER

## 2018-03-13 RX ORDER — AZITHROMYCIN 250 MG/1
TABLET, FILM COATED ORAL
Qty: 6 TABLET | Refills: 0 | Status: SHIPPED | OUTPATIENT
Start: 2018-03-13 | End: 2018-05-04

## 2018-03-13 ASSESSMENT — PAIN SCALES - GENERAL: PAINLEVEL: MILD PAIN (3)

## 2018-03-13 ASSESSMENT — ANXIETY QUESTIONNAIRES
5. BEING SO RESTLESS THAT IT IS HARD TO SIT STILL: NOT AT ALL
IF YOU CHECKED OFF ANY PROBLEMS ON THIS QUESTIONNAIRE, HOW DIFFICULT HAVE THESE PROBLEMS MADE IT FOR YOU TO DO YOUR WORK, TAKE CARE OF THINGS AT HOME, OR GET ALONG WITH OTHER PEOPLE: NOT DIFFICULT AT ALL
2. NOT BEING ABLE TO STOP OR CONTROL WORRYING: NOT AT ALL
GAD7 TOTAL SCORE: 3
6. BECOMING EASILY ANNOYED OR IRRITABLE: SEVERAL DAYS
3. WORRYING TOO MUCH ABOUT DIFFERENT THINGS: NOT AT ALL
1. FEELING NERVOUS, ANXIOUS, OR ON EDGE: SEVERAL DAYS
7. FEELING AFRAID AS IF SOMETHING AWFUL MIGHT HAPPEN: NOT AT ALL

## 2018-03-13 ASSESSMENT — PATIENT HEALTH QUESTIONNAIRE - PHQ9: 5. POOR APPETITE OR OVEREATING: SEVERAL DAYS

## 2018-03-13 NOTE — MR AVS SNAPSHOT
After Visit Summary   3/13/2018    Soo Timmons    MRN: [id number]           Patient Information     Date Of Birth          1980        Visit Information        Provider Department      3/13/2018 4:00 PM Rossi Ambrose, FRANCESCA [facility] [place]        Today's Diagnoses     Cough    -  1    Immunosuppressed due to chemotherapy        Crohn's disease of ileum with complication (H)           Follow-ups after your visit        Your next 10 appointments already scheduled     Mar 23, 2018 11:30 AM CDT   Level 3 with NL INFUSION CHAIR 3   [facility] ([facility])    [address] [place] [address] [phone]            Apr 09, 2018  1:00 PM CDT   Level 3 with NL INFUSION CHAIR 1   [facility] ([facility])    [address] [place] [id number] [phone]              Who to contact     If you have questions or need follow up information about today's clinic visit or your schedule please contact [facility] directly at [phone].  Normal or non-critical lab and imaging results will be communicated to you by [employer], letter or phone within 4 business days after the clinic has received the results. If you do not hear from us within 7 days, please contact the clinic through [employer] or phone. If you have a critical or abnormal lab result, we will notify you by phone as soon as possible.  Submit refill requests through [employer] or call your pharmacy and they will forward the refill request to us. Please allow 3 business days for your refill to be completed.          Additional Information About Your Visit        [employer] Information     [employer] gives you secure access to your electronic health record. If you see a primary care provider, you can also send messages to your care team and make appointments. If you have questions, please call your primary care clinic.  If you do not  "have a primary care provider, please call [phone] and they will assist you.        Care EveryWhere ID     This is your Care EveryWhere ID. This could be used by other organizations to access your [place] medical records  [id number]        Your Vitals Were     Pulse Temperature Respirations Height Pulse Oximetry BMI (Body Mass Index)    93 98  F (36.7  C) (Temporal) 16 5' 5.5\" (1.664 m) 99% 21.52 kg/m2       Blood Pressure from Last 3 Encounters:   03/13/18 100/64   03/12/18 136/75   02/26/18 125/70    Weight from Last 3 Encounters:   03/13/18 131 lb 4.8 oz (59.6 kg)   02/26/18 126 lb 9.6 oz (57.4 kg)   02/23/18 123 lb (55.8 kg)              Today, you had the following     No orders found for display         Today's Medication Changes          These changes are accurate as of 3/13/18 11:59 PM.  If you have any questions, ask your nurse or doctor.               Start taking these medicines.        Dose/Directions    azithromycin 250 MG tablet   Commonly known as:  ZITHROMAX   Used for:  Cough   Started by:  Rossi Ambrose APRN CNP        2 tablets daily on day one, then one tablet po daily until gone.   Quantity:  6 tablet   Refills:  0            Where to get your medicines      These medications were sent to [id number] - [place], [place] - [address] [address]  [address] [address] [address]     Phone:  [phone]     azithromycin 250 MG tablet                Primary Care Provider Office Phone # Fax #    FRANCESCA Patiño [phone] [address] [place] [address]        Goals        General    Psychosocial I will devlope a safety plan (pt-stated)     Notes - Note created  11/5/2015  3:13 PM by Magdalena Warren LSW    As of today's date 11/5/2015 goal is met at 0 - 25%.   Goal Status:  Active      Psychosocial/ I will go to talk with Lalita Watters (pt-stated)     Notes - Note created  11/5/2015  3:12 PM by Magdalena Warren LSW    As of today's date " 11/5/2015 goal is met at 0 - 25%.   Goal Status:  Active        Equal Access to Services     DELORES [employer] GroupARSALAN : Hadii fidel andrade reina Dunlap, waalejada luqecho, qacindita kaclarkda mariana, hira joein hayaajose mccormackchapin sanchez laBaljinderale silverman. So [facility] [phone].    ATENCIÓN: Si habla español, tiene a meier disposición servicios gratuitos de asistencia lingüística. Llame al [phone].    We comply with applicable federal civil rights laws and [place] laws. We do not discriminate on the basis of race, color, national origin, age, disability, sex, sexual orientation, or gender identity.            Thank you!     Thank you for choosing [facility]  for your care. Our goal is always to provide you with excellent care. Hearing back from our patients is one way we can continue to improve our services. Please take a few minutes to complete the written survey that you may receive in the mail after your visit with us. Thank you!             Your Updated Medication List - Protect others around you: Learn how to safely use, store and throw away your medicines at www.disposemymeds.org.          This list is accurate as of 3/13/18 11:59 PM.  Always use your most recent med list.                   Brand Name Dispense Instructions for use Diagnosis    azithromycin 250 MG tablet    ZITHROMAX    6 tablet    2 tablets daily on day one, then one tablet po daily until gone.    Cough       busPIRone 5 MG tablet    BUSPAR    150 tablet    Start at 5 mg twice daily for 3 days, then 7.5 mg (1.5 tabs) twice daily for 3 days, then 10 mg (2 tabs) twice daily for 3 days, then 12.5 mg (2.5 tabs) twice daily for 3 days, then 15 mg (3 tabs) twice daily and stay at that dose    JUJU (generalized anxiety disorder)       FLUoxetine 20 MG tablet     180 tablet    Take 2 tablets (40 mg) by mouth daily    Mild major depression (H), Crohn's disease of ileum with other complication (H)       hydrOXYzine 25 MG tablet    ATARAX    20 tablet    Take 0.5-1  tablets (12.5-25 mg) by mouth every 6 hours as needed for anxiety    JUJU (generalized anxiety disorder)       MIRENA (52 MG) 20 MCG/24HR IUD   Generic drug:  levonorgestrel     1 Intra Uterine Device    intrauterine uterine device placed    Menorrhagia, Well woman exam with routine gynecological exam, Insertion of IUD, Contraception       Multi-vitamin Tabs tablet      Take 1 tablet by mouth daily        oxyCODONE IR 5 MG tablet    ROXICODONE    20 tablet    Take 0.5-1 tablets (2.5-5 mg) by mouth every 6 hours as needed for pain        pantoprazole 40 MG EC tablet    PROTONIX    90 tablet    Take 1 tablet (40 mg) by mouth daily    Crohn's disease of ileum with other complication (H)       predniSONE 10 MG tablet    DELTASONE     TAKE 4 TABLETS (40 MG) BY MOUTH DAILY FOR 5 DAYS THEN 3 TABLETS (30 MG) DAILY UNTIL GI CLINIC FOLLOW UP        PROBIOTIC ADVANCED PO           TYLENOL PO      Take 500 mg by mouth

## 2018-03-13 NOTE — PROGRESS NOTES
SUBJECTIVE:   Soo Timmons is a 37 year old female who presents to clinic today for the following health issues:      History of Present Illness     Migraines:     Headache Symptoms are:  Worsening    Migraine frequency::  5 per week    Migraine Duration::  >3 days    Ability to perform ADL's::  No    Migraine Rescue/Relief Medications::  Tylenol    Effectiveness of rescue/relief medications::  Moderate relief    Migraine Preventative Medications::  None    Neurological symptoms::  Numbness    ER or UC Visits::  None    Diet:  Gluten-free/reduced and Other  Frequency of exercise:  2-3 days/week  Duration of exercise:  15-30 minutes  Taking medications regularly:  Yes  Medication side effects:  Muscle aches, Significant flushing and Other  Additional concerns today:  No    Acute Illness   Acute illness concerns: cough , congestion   Onset: 2 weeks     Fever: no     Chills/Sweats: YES, night sweats     Headache (location?): YES    Sinus Pressure:no    Conjunctivitis:  no    Ear Pain: no    Rhinorrhea: YES    Congestion: YES    Sore Throat: no      Cough: YES-productive of green sputum    Wheeze: YES    Decreased Appetite: YES    Nausea: YES, after infusion     Vomiting: no     Diarrhea:  no     Dysuria/Freq.: no     Fatigue/Achiness: YES, body aches , no fatigue     Sick/Strep Exposure: no      Therapies Tried and outcome: No therapies tried.     Depression and Anxiety Follow-Up    Status since last visit: Improved    Other associated symptoms: situationall panic attacks     Complicating factors:     Significant life event: No     Current substance abuse: None    PHQ-9 2/8/2018 2/23/2018 3/13/2018   Total Score 5 1 3   Q9: Suicide Ideation Not at all Not at all Not at all     JUJU-7 SCORE 2/8/2018 2/23/2018 3/13/2018   Total Score - - -   Total Score 6 (mild anxiety) 2 (minimal anxiety) -   Total Score 6 2 3     In the past two weeks have you had thoughts of suicide or self-harm?  No.    Do you have concerns  "about your personal safety or the safety of others?   No  PHQ-9  English  PHQ-9   Any Language  JUJU-7  Suicide Assessment Five-step Evaluation and Treatment (SAFE-T)  Problem list and histories reviewed & adjusted, as indicated.  Additional history: as documented    Patient presents to clinic to be cleared for infusion. Reports having fevers, cough and shortness of breath. Her WBC and neutrophil levels are slightly elevated.     Reports night sweats, muscle weakness/soreness, and bruising on left upper thigh that appeared after infusion. Reports bruising not being unusual as she is \"clutzy\"    Reports home life as going well and stress has decreased greatly. Her  has stayed sober around the family, but does go out and drink. No possibility of pregnancy. Body feels swollen as she is gaining weight. Work is going well and accommodations meeting went well. Reports continuing with organic food diet and not eating processed foods.    Patient Active Problem List   Diagnosis     Migraine with aura     Fatigue     Surveillance of previously prescribed intrauterine contraceptive device     Anemia     CARDIOVASCULAR SCREENING; LDL GOAL LESS THAN 160     Health Care Home     Anxiety     Colitis     Mild major depression (H)     GERD (gastroesophageal reflux disease)     Crohn's ileitis (H)     Malabsorption     Inflammatory bowel disease (Crohn's disease) (H)     Iron deficiency anemia     Vitamin B 12 deficiency     Irritable bowel syndrome     Past Surgical History:   Procedure Laterality Date     COLONOSCOPY  10/04/06    [facility]      COLONOSCOPY  12/20/2013    Procedure: COMBINED COLONOSCOPY, SINGLE BIOPSY/POLYPECTOMY BY BIOPSY;;  Surgeon: Presley Ocampo MD;  Location:  OR      REMOVE INTRAUTERINE DEVICE  09/02/08      UGI ENDOSCOPY, SIMPLE EXAM  10/4/2006     LAPAROSCOPIC CHOLECYSTECTOMY  5/16/2011    Procedure:LAPAROSCOPIC CHOLECYSTECTOMY; Surgeon:LIYAH AVELAR; Location: OR       [facility] " History   Substance Use Topics     Smoking status: Never Smoker     Smokeless tobacco: Never Used     Alcohol use No     Family History   Problem Relation Age of Onset     Hypertension Mother      Alzheimer Disease Maternal Grandmother      Asthma No family hx of      C.A.D. No family hx of      DIABETES No family hx of      CEREBROVASCULAR DISEASE No family hx of      Breast Cancer No family hx of      Cancer - colorectal No family hx of      Prostate Cancer No family hx of      Alcohol/Drug No family hx of      Allergies No family hx of      Anesthesia Reaction No family hx of      Arthritis No family hx of      Blood Disease No family hx of      CANCER No family hx of      Circulatory No family hx of      Depression No family hx of      Endocrine Disease No family hx of      Eye Disorder No family hx of      Genetic Disorder No family hx of      Gynecology No family hx of      GASTROINTESTINAL DISEASE No family hx of      Genitourinary Problems No family hx of      HEART DISEASE No family hx of      Lipids No family hx of      Neurologic Disorder No family hx of      Obesity No family hx of      Hearing Loss No family hx of      Respiratory No family hx of      OSTEOPOROSIS No family hx of      Musculoskeletal Disorder No family hx of      Thyroid Disease No family hx of      Psychotic Disorder No family hx of      Cardiovascular No family hx of      Congenital Anomalies No family hx of      Connective Tissue Disorder No family hx of      Coronary Artery Disease No family hx of      Hyperlipidemia No family hx of      Ovarian Cancer No family hx of      Depression/Anxiety No family hx of      Thyroid Disease No family hx of      Chemical Addiction No family hx of      Known Genetic Syndrome No family hx of      Anxiety Disorder No family hx of      MENTAL ILLNESS No family hx of      Substance Abuse No family hx of      Colon Cancer No family hx of      Other Cancer No family hx of          Current Outpatient  Prescriptions   Medication Sig Dispense Refill     azithromycin (ZITHROMAX) 250 MG tablet 2 tablets daily on day one, then one tablet po daily until gone. 6 tablet 0     FLUoxetine 20 MG tablet Take 2 tablets (40 mg) by mouth daily 180 tablet 1     hydrOXYzine (ATARAX) 25 MG tablet Take 0.5-1 tablets (12.5-25 mg) by mouth every 6 hours as needed for anxiety 20 tablet 1     pantoprazole (PROTONIX) 40 MG EC tablet Take 1 tablet (40 mg) by mouth daily 90 tablet 0     Acetaminophen (TYLENOL PO) Take 500 mg by mouth       busPIRone (BUSPAR) 5 MG tablet Start at 5 mg twice daily for 3 days, then 7.5 mg (1.5 tabs) twice daily for 3 days, then 10 mg (2 tabs) twice daily for 3 days, then 12.5 mg (2.5 tabs) twice daily for 3 days, then 15 mg (3 tabs) twice daily and stay at that dose 150 tablet 0     Probiotic Product (PROBIOTIC ADVANCED PO)        multivitamin, therapeutic with minerals (MULTI-VITAMIN) TABS Take 1 tablet by mouth daily       MIRENA 20 MCG/24HR IU IUD intrauterine uterine device placed 1 Intra Uterine Device 0     predniSONE (DELTASONE) 10 MG tablet TAKE 4 TABLETS (40 MG) BY MOUTH DAILY FOR 5 DAYS THEN 3 TABLETS (30 MG) DAILY UNTIL GI CLINIC FOLLOW UP  0     oxyCODONE IR (ROXICODONE) 5 MG tablet Take 0.5-1 tablets (2.5-5 mg) by mouth every 6 hours as needed for pain (Patient not taking: Reported on 3/13/2018) 20 tablet 0     Allergies   Allergen Reactions     Cimzia [Certolizumab Pegol] Other (See Comments)     Redness and swelling @ injection site     Humira Other (See Comments)     Inflammation, redness and swelling @ injection site     Remicade [Infliximab] Swelling     Banana      Anaphylaxis     Latex      Anaphylaxis     Prilosec [Omeprazole Magnesium] Diarrhea     Recent Labs   Lab Test  03/12/18   1255  02/26/18   1312  01/02/18   2147  12/26/17   1910   05/22/17   0959   01/09/15   0816   06/24/13   1040  07/12/12   0819   LDL   --    --    --    --    --   73   --    --    --    --   79   HDL   --    " --    --    --    --   31*   --    --    --    --   28*   TRIG   --    --    --    --    --   75   --    --    --    --   83   ALT  22  30  14  13   < >   --    < >  9   < >  16   --    CR   --    --   0.90  0.80   < >   --    < >  0.86   < >  0.73   --    GFRESTIMATED   --    --   70  80   < >   --    < >  76   < >  >90   --    GFRESTBLACK   --    --   85  >90   < >   --    < >  >90   GFR Calc     < >  >90   --    POTASSIUM   --    --   3.4  4.0   < >   --    < >  3.4   < >  3.8   --    TSH   --    --    --    --    --    --    --   2.49   --   2.16  2.11    < > = values in this interval not displayed.      BP Readings from Last 3 Encounters:   03/13/18 100/64   03/12/18 136/75   02/26/18 125/70    Wt Readings from Last 3 Encounters:   03/13/18 131 lb 4.8 oz (59.6 kg)   02/26/18 126 lb 9.6 oz (57.4 kg)   02/23/18 123 lb (55.8 kg)        ROS:  Constitutional, HEENT, cardiovascular, pulmonary, GI, , musculoskeletal, neuro, skin, endocrine and psych systems are negative, except as otherwise noted.    This document serves as a record of the services and decisions personally performed and made by Rossi Ambrose CNP. It was created on his/her behalf by Negin Cohen, trained medical scribe. The creation of this document is based the provider's statements to the medical scribes.    Valentino Cohen 4:09 PM, March 13, 2018    OBJECTIVE:     /64  Pulse 93  Temp 98  F (36.7  C) (Temporal)  Resp 16  Ht 5' 5.5\" (1.664 m)  Wt 131 lb 4.8 oz (59.6 kg)  SpO2 99%  BMI 21.52 kg/m2  Body mass index is 21.52 kg/(m^2).  GENERAL: healthy, alert and no distress  HENT: ear canals and TM's normal, nose and mouth without ulcers or lesions  NECK: no adenopathy, no asymmetry, masses, or scars and thyroid normal to palpation  RESP: lungs clear to auscultation - no rales, rhonchi or wheezes  CV: regular rate and rhythm, normal S1 S2, no S3 or S4, no murmur, click or rub, no peripheral edema and peripheral " pulses strong  ABDOMEN: soft, nontender, no hepatosplenomegaly, no masses and bowel sounds normal    Diagnostic Test Results:  none     ASSESSMENT/PLAN:       ICD-10-CM    1. Cough R05 azithromycin (ZITHROMAX) 250 MG tablet   2. Immunosuppressed due to chemotherapy Z79.899    3. Crohn's disease of ileum with complication (H) K50.019      Immunosuppressed status with infusion therapy. Need to address CBC. Mild URI currently. Crohn's and weight is stable.   Mood improving with family issues.     Antiobiotic, see orders. Return to clinic if symptoms persist or worsen. OTC's antypyretics/analgesics prn, fluids, rest, supportive cares. OTC decongestants, prn.    Patient will continue on current organic, healthy diet for weight management.    Has pre-infusion labs arranged. Will approve is WBC is normalized.     Follow up with PCP as needed if not improving.    The information in this document, created by the medical scribe for me, accurately reflects the services I personally performed and the decisions made by me. I have reviewed and approved this document for accuracy prior to leaving the patient care area.  Rossi Ambrose CNP  4:09 PM, 03/13/18    FRANCESCA Jones [facility]

## 2018-03-14 ASSESSMENT — PATIENT HEALTH QUESTIONNAIRE - PHQ9: SUM OF ALL RESPONSES TO PHQ QUESTIONS 1-9: 3

## 2018-03-14 ASSESSMENT — ANXIETY QUESTIONNAIRES: GAD7 TOTAL SCORE: 3

## 2018-03-14 NOTE — TELEPHONE ENCOUNTER
Per Provider- Jolie Yang works with [employer]   Faxed lab result to [phone] to be reviewed. Asked her to call KL at the pod number to advise.

## 2018-03-14 NOTE — TELEPHONE ENCOUNTER
On 3/12/2018, Rossi guajardo PCP asked that we forward lab results to Jolie Yang with [facility] , to review the results and advise Rossi.  Was unable to route the encounter to this employee, so I faxed the lab results to  [phone].      Today, received a response via fax noting to call [phone].  Left detailed message on that voicemail asking that Jolie Yang return call to our clinic.

## 2018-03-14 NOTE — TELEPHONE ENCOUNTER
Radha, nurse called back and stated that Jolie Yang does not work at that site.  Provider, please advise if you want the results sent to someone else to be reviewed.  Thanks.

## 2018-03-19 NOTE — TELEPHONE ENCOUNTER
OK to close encounter. Pt. treated for URI symptoms and has lab draw prior to next infusion. Rossi Ambrose

## 2018-03-23 ENCOUNTER — INFUSION THERAPY VISIT (OUTPATIENT)
Dept: INFUSION THERAPY | Facility: CLINIC | Age: 38
End: 2018-03-23
Attending: PHYSICIAN ASSISTANT
Payer: COMMERCIAL

## 2018-03-23 VITALS
RESPIRATION RATE: 16 BRPM | DIASTOLIC BLOOD PRESSURE: 76 MMHG | OXYGEN SATURATION: 98 % | TEMPERATURE: 98.7 F | SYSTOLIC BLOOD PRESSURE: 113 MMHG | WEIGHT: 137.2 LBS | HEART RATE: 72 BPM | BODY MASS INDEX: 22.48 KG/M2

## 2018-03-23 DIAGNOSIS — K50.019 CROHN'S DISEASE OF SMALL INTESTINE WITH COMPLICATION (H): Primary | ICD-10-CM

## 2018-03-23 LAB
ALBUMIN SERPL-MCNC: 3.7 G/DL (ref 3.4–5)
ALP SERPL-CCNC: 70 U/L (ref 40–150)
ALT SERPL W P-5'-P-CCNC: 41 U/L (ref 0–50)
AST SERPL W P-5'-P-CCNC: 25 U/L (ref 0–45)
BASOPHILS # BLD AUTO: 0 10E9/L (ref 0–0.2)
BASOPHILS NFR BLD AUTO: 0.4 %
BILIRUB DIRECT SERPL-MCNC: <0.1 MG/DL (ref 0–0.2)
BILIRUB SERPL-MCNC: 0.3 MG/DL (ref 0.2–1.3)
CRP SERPL-MCNC: <2.9 MG/L (ref 0–8)
DIFFERENTIAL METHOD BLD: ABNORMAL
EOSINOPHIL # BLD AUTO: 0.2 10E9/L (ref 0–0.7)
EOSINOPHIL NFR BLD AUTO: 3 %
ERYTHROCYTE [DISTWIDTH] IN BLOOD BY AUTOMATED COUNT: 15.3 % (ref 10–15)
ERYTHROCYTE [SEDIMENTATION RATE] IN BLOOD BY WESTERGREN METHOD: 5 MM/H (ref 0–20)
HCT VFR BLD AUTO: 40.5 % (ref 35–47)
HGB BLD-MCNC: 12.2 G/DL (ref 11.7–15.7)
IMM GRANULOCYTES # BLD: 0 10E9/L (ref 0–0.4)
IMM GRANULOCYTES NFR BLD: 0.4 %
LYMPHOCYTES # BLD AUTO: 2.1 10E9/L (ref 0.8–5.3)
LYMPHOCYTES NFR BLD AUTO: 28 %
MCH RBC QN AUTO: 26.3 PG (ref 26.5–33)
MCHC RBC AUTO-ENTMCNC: 30.1 G/DL (ref 31.5–36.5)
MCV RBC AUTO: 87 FL (ref 78–100)
MONOCYTES # BLD AUTO: 0.6 10E9/L (ref 0–1.3)
MONOCYTES NFR BLD AUTO: 7.5 %
NEUTROPHILS # BLD AUTO: 4.4 10E9/L (ref 1.6–8.3)
NEUTROPHILS NFR BLD AUTO: 60.7 %
PLATELET # BLD AUTO: 297 10E9/L (ref 150–450)
PROT SERPL-MCNC: 6.5 G/DL (ref 6.8–8.8)
RBC # BLD AUTO: 4.64 10E12/L (ref 3.8–5.2)
WBC # BLD AUTO: 7.3 10E9/L (ref 4–11)

## 2018-03-23 PROCEDURE — 80076 HEPATIC FUNCTION PANEL: CPT | Performed by: PHYSICIAN ASSISTANT

## 2018-03-23 PROCEDURE — 36415 COLL VENOUS BLD VENIPUNCTURE: CPT

## 2018-03-23 PROCEDURE — 25000128 H RX IP 250 OP 636: Performed by: FAMILY MEDICINE

## 2018-03-23 PROCEDURE — 86140 C-REACTIVE PROTEIN: CPT | Performed by: PHYSICIAN ASSISTANT

## 2018-03-23 PROCEDURE — 85652 RBC SED RATE AUTOMATED: CPT | Performed by: PHYSICIAN ASSISTANT

## 2018-03-23 PROCEDURE — 96413 CHEMO IV INFUSION 1 HR: CPT

## 2018-03-23 PROCEDURE — 85025 COMPLETE CBC W/AUTO DIFF WBC: CPT | Performed by: PHYSICIAN ASSISTANT

## 2018-03-23 PROCEDURE — 96409 CHEMO IV PUSH SNGL DRUG: CPT

## 2018-03-23 RX ADMIN — VEDOLIZUMAB 300 MG: 300 INJECTION, POWDER, LYOPHILIZED, FOR SOLUTION INTRAVENOUS at 13:34

## 2018-03-23 RX ADMIN — SODIUM CHLORIDE 250 ML: 9 INJECTION, SOLUTION INTRAVENOUS at 13:32

## 2018-03-23 NOTE — PROGRESS NOTES
Patient reports the painful joints and muscles have improved, still has a slight cough, mostly at night, but improved. Denies any further bruising, mouth or nose sores, ringing in ears/dizziness or constipation.  States she was on a z-pack which she completed the course last week.  Overall is feeling much better, and appetite is good.  Tolerated Entyvio infusion, PIV d/c'd per protocol. Discharged in stable condition and ambulatory.

## 2018-03-23 NOTE — MR AVS SNAPSHOT
After Visit Summary   3/23/2018    Soo Timmons    MRN: [id number]           Patient Information     Date Of Birth          1980        Visit Information        Provider Department      3/23/2018 11:30 AM NL INFUSION CHAIR 3 [facility]        Today's Diagnoses     Crohn's disease of small intestine with complication (H)    -  1       Follow-ups after your visit        Follow-up notes from your care team     Return in about 5 weeks (around 4/30/2018).      Your next 10 appointments already scheduled     Apr 09, 2018  1:00 PM CDT   Level 3 with NL INFUSION CHAIR 1   [facility] ([facility])    [address] [place] [id number] [phone]              Who to contact     If you have questions or need follow up information about today's clinic visit or your schedule please contact [facility] directly at [phone].  Normal or non-critical lab and imaging results will be communicated to you by MyChart, letter or phone within 4 business days after the clinic has received the results. If you do not hear from us within 7 days, please contact the clinic through [employer] or phone. If you have a critical or abnormal lab result, we will notify you by phone as soon as possible.  Submit refill requests through [employer] or call your pharmacy and they will forward the refill request to us. Please allow 3 business days for your refill to be completed.          Additional Information About Your Visit        MyChart Information     [employer] gives you secure access to your electronic health record. If you see a primary care provider, you can also send messages to your care team and make appointments. If you have questions, please call your primary care clinic.  If you do not have a primary care provider, please call [phone] and they will assist you.        Care EveryWhere ID     This is your Care  EveryWhere ID. This could be used by other organizations to access your [place] medical records  [id number]        Your Vitals Were     Pulse Temperature Respirations Pulse Oximetry BMI (Body Mass Index)       72 98.7  F (37.1  C) (Temporal) 16 98% 22.48 kg/m2        Blood Pressure from Last 3 Encounters:   03/23/18 113/76   03/13/18 100/64   03/12/18 136/75    Weight from Last 3 Encounters:   03/23/18 62.2 kg (137 lb 3.2 oz)   03/13/18 59.6 kg (131 lb 4.8 oz)   02/26/18 57.4 kg (126 lb 9.6 oz)              We Performed the Following     CBC with platelets differential     CRP inflammation     Erythrocyte sedimentation rate auto     Hepatic panel        Primary Care Provider Office Phone # Fax #    Rossi FRANCESCA Wiseman [facility] [phone] [address] [address]        Goals        General    Psychosocial I will devlope a safety plan (pt-stated)     Notes - Note created  11/5/2015  3:13 PM by Magdalena Warren LSW    As of today's date 11/5/2015 goal is met at 0 - 25%.   Goal Status:  Active      Psychosocial/ I will go to talk with Lalita Watters (pt-stated)     Notes - Note created  11/5/2015  3:12 PM by Magdalena Warren LSW    As of today's date 11/5/2015 goal is met at 0 - 25%.   Goal Status:  Active        Equal Access to Services     [facility] AH: Hadii fidel sofiao Sojonathanali, waaxda luqadaha, qaybta kaalmada kseniaegyada, hira jordan [facility] Center[facility] silverman. So [facility] [phone].    ATENCIÓN: Si habla español, tiene a meier disposición servicios gratuitos de asistencia lingüística. Llame al [phone].    We comply with applicable federal civil rights laws and [place] laws. We do not discriminate on the basis of race, color, national origin, age, disability, sex, sexual orientation, or gender identity.            Thank you!     Thank you for choosing [facility]  for your care. Our goal is always to provide you with excellent care. Hearing  back from our patients is one way we can continue to improve our services. Please take a few minutes to complete the written survey that you may receive in the mail after your visit with us. Thank you!             Your Updated Medication List - Protect others around you: Learn how to safely use, store and throw away your medicines at www.disposemymeds.org.          This list is accurate as of 3/23/18  2:31 PM.  Always use your most recent med list.                   Brand Name Dispense Instructions for use Diagnosis    azithromycin 250 MG tablet    ZITHROMAX    6 tablet    2 tablets daily on day one, then one tablet po daily until gone.    Cough       busPIRone 5 MG tablet    BUSPAR    150 tablet    Start at 5 mg twice daily for 3 days, then 7.5 mg (1.5 tabs) twice daily for 3 days, then 10 mg (2 tabs) twice daily for 3 days, then 12.5 mg (2.5 tabs) twice daily for 3 days, then 15 mg (3 tabs) twice daily and stay at that dose    JUJU (generalized anxiety disorder)       FLUoxetine 20 MG tablet     180 tablet    Take 2 tablets (40 mg) by mouth daily    Mild major depression (H), Crohn's disease of ileum with other complication (H)       hydrOXYzine 25 MG tablet    ATARAX    20 tablet    Take 0.5-1 tablets (12.5-25 mg) by mouth every 6 hours as needed for anxiety    JUJU (generalized anxiety disorder)       MIRENA (52 MG) 20 MCG/24HR IUD   Generic drug:  levonorgestrel     1 Intra Uterine Device    intrauterine uterine device placed    Menorrhagia, Well woman exam with routine gynecological exam, Insertion of IUD, Contraception       Multi-vitamin Tabs tablet      Take 1 tablet by mouth daily        oxyCODONE IR 5 MG tablet    ROXICODONE    20 tablet    Take 0.5-1 tablets (2.5-5 mg) by mouth every 6 hours as needed for pain        pantoprazole 40 MG EC tablet    PROTONIX    90 tablet    Take 1 tablet (40 mg) by mouth daily    Crohn's disease of ileum with other complication (H)       predniSONE 10 MG tablet    DELTASONE      TAKE 4 TABLETS (40 MG) BY MOUTH DAILY FOR 5 DAYS THEN 3 TABLETS (30 MG) DAILY UNTIL GI CLINIC FOLLOW UP        PROBIOTIC ADVANCED PO           TYLENOL PO      Take 500 mg by mouth

## 2018-04-20 ENCOUNTER — INFUSION THERAPY VISIT (OUTPATIENT)
Dept: INFUSION THERAPY | Facility: CLINIC | Age: 38
End: 2018-04-20
Attending: NURSE PRACTITIONER
Payer: COMMERCIAL

## 2018-04-20 VITALS
SYSTOLIC BLOOD PRESSURE: 112 MMHG | TEMPERATURE: 98.4 F | WEIGHT: 139.4 LBS | HEIGHT: 66 IN | DIASTOLIC BLOOD PRESSURE: 73 MMHG | BODY MASS INDEX: 22.4 KG/M2 | HEART RATE: 103 BPM | OXYGEN SATURATION: 100 % | RESPIRATION RATE: 16 BRPM

## 2018-04-20 DIAGNOSIS — K50.019 CROHN'S DISEASE OF SMALL INTESTINE WITH COMPLICATION (H): Primary | ICD-10-CM

## 2018-04-20 PROCEDURE — 96413 CHEMO IV INFUSION 1 HR: CPT

## 2018-04-20 PROCEDURE — 25000128 H RX IP 250 OP 636: Performed by: FAMILY MEDICINE

## 2018-04-20 RX ADMIN — SODIUM CHLORIDE 250 ML: 9 INJECTION, SOLUTION INTRAVENOUS at 12:37

## 2018-04-20 RX ADMIN — VEDOLIZUMAB 300 MG: 300 INJECTION, POWDER, LYOPHILIZED, FOR SOLUTION INTRAVENOUS at 12:39

## 2018-04-20 ASSESSMENT — PAIN SCALES - GENERAL: PAINLEVEL: SEVERE PAIN (7)

## 2018-04-20 NOTE — PATIENT INSTRUCTIONS
Pt to return on 06/15/18 for Entyvio/Labs. Copies of medication list and upcoming appointments given prior to discharge.

## 2018-04-20 NOTE — PROGRESS NOTES
Patient here today for Entyvio infusion. No premeds given, tolerated infusion well. Blood return pre and post infusion. Denies signs or symptoms of reaction, discharged to home in stable condition.

## 2018-04-20 NOTE — MR AVS SNAPSHOT
After Visit Summary   4/20/2018    Soo Timmons    MRN: [id number]           Patient Information     Date Of Birth          1980        Visit Information        Provider Department      4/20/2018 12:00 PM NL INFUSION CHAIR 1 [facility]        Today's Diagnoses     Crohn's disease of small intestine with complication (H)    -  1      Care Instructions    Pt to return on 06/15/18 for Entyvio/Labs. Copies of medication list and upcoming appointments given prior to discharge.             Follow-ups after your visit        Follow-up notes from your care team     Return in about 8 weeks (around 6/15/2018).      Your next 10 appointments already scheduled     Baljinder 15, 2018  9:00 AM CDT   Level 3 with NL INFUSION CHAIR 4   [facility] ([facility])    [address] [place] [address] [phone]              Who to contact     If you have questions or need follow up information about today's clinic visit or your schedule please contact [facility] directly at [phone].  Normal or non-critical lab and imaging results will be communicated to you by Azuquahart, letter or phone within 4 business days after the clinic has received the results. If you do not hear from us within 7 days, please contact the clinic through Azuquahart or phone. If you have a critical or abnormal lab result, we will notify you by phone as soon as possible.  Submit refill requests through Saqina or call your pharmacy and they will forward the refill request to us. Please allow 3 business days for your refill to be completed.          Additional Information About Your Visit        MyChart Information     Saqina gives you secure access to your electronic health record. If you see a primary care provider, you can also send messages to your care team and make appointments. If you have questions, please call your primary care clinic.   "If you do not have a primary care provider, please call [phone] and they will assist you.        Care EveryWhere ID     This is your Care EveryWhere ID. This could be used by other organizations to access your [place] medical records  [id number]        Your Vitals Were     Pulse Temperature Respirations Height Pulse Oximetry BMI (Body Mass Index)    103 98.4  F (36.9  C) (Temporal) 16 1.664 m (5' 5.5\") 100% 22.84 kg/m2       Blood Pressure from Last 3 Encounters:   04/20/18 112/73   03/23/18 113/76   03/13/18 100/64    Weight from Last 3 Encounters:   04/20/18 63.2 kg (139 lb 6.4 oz)   03/23/18 62.2 kg (137 lb 3.2 oz)   03/13/18 59.6 kg (131 lb 4.8 oz)              Today, you had the following     No orders found for display       Primary Care Provider Office Phone # Fax #    FRANCECSA Patiño [facility] [phone]       [address] [address] [address]        Goals        General    Psychosocial I will devlope a safety plan (pt-stated)     Notes - Note created  11/5/2015  3:13 PM by Magdalena Warren LSW    As of today's date 11/5/2015 goal is met at 0 - 25%.   Goal Status:  Active      Psychosocial/ I will go to talk with Lalita Watters (pt-stated)     Notes - Note created  11/5/2015  3:12 PM by Magdalena Warren LSW    As of today's date 11/5/2015 goal is met at 0 - 25%.   Goal Status:  Active        Equal Access to Services     JASMINE COBIAN AH: Hadii fidel Dunlap, jacoboda lususan, qaybta hira beach. So [facility] [phone].    ATENCIÓN: Si habla español, tiene a meier disposición servicios gratuitos de asistencia lingüística. Llame al [phone].    We comply with applicable federal civil rights laws and [place] laws. We do not discriminate on the basis of race, color, national origin, age, disability, sex, sexual orientation, or gender identity.            Thank you!     Thank you for choosing [facility] " SERVICES  for your care. Our goal is always to provide you with excellent care. Hearing back from our patients is one way we can continue to improve our services. Please take a few minutes to complete the written survey that you may receive in the mail after your visit with us. Thank you!             Your Updated Medication List - Protect others around you: Learn how to safely use, store and throw away your medicines at www.disposemymeds.org.          This list is accurate as of 4/20/18  2:49 PM.  Always use your most recent med list.                   Brand Name Dispense Instructions for use Diagnosis    azithromycin 250 MG tablet    ZITHROMAX    6 tablet    2 tablets daily on day one, then one tablet po daily until gone.    Cough       busPIRone 5 MG tablet    BUSPAR    150 tablet    Start at 5 mg twice daily for 3 days, then 7.5 mg (1.5 tabs) twice daily for 3 days, then 10 mg (2 tabs) twice daily for 3 days, then 12.5 mg (2.5 tabs) twice daily for 3 days, then 15 mg (3 tabs) twice daily and stay at that dose    JUJU (generalized anxiety disorder)       FLUoxetine 20 MG tablet     180 tablet    Take 2 tablets (40 mg) by mouth daily    Mild major depression (H), Crohn's disease of ileum with other complication (H)       hydrOXYzine 25 MG tablet    ATARAX    20 tablet    Take 0.5-1 tablets (12.5-25 mg) by mouth every 6 hours as needed for anxiety    JUJU (generalized anxiety disorder)       MIRENA (52 MG) 20 MCG/24HR IUD   Generic drug:  levonorgestrel     1 Intra Uterine Device    intrauterine uterine device placed    Menorrhagia, Well woman exam with routine gynecological exam, Insertion of IUD, Contraception       Multi-vitamin Tabs tablet      Take 1 tablet by mouth daily        oxyCODONE IR 5 MG tablet    ROXICODONE    20 tablet    Take 0.5-1 tablets (2.5-5 mg) by mouth every 6 hours as needed for pain        pantoprazole 40 MG EC tablet    PROTONIX    90 tablet    Take 1 tablet (40 mg) by mouth daily    Crohn's  disease of ileum with other complication (H)       predniSONE 10 MG tablet    DELTASONE     TAKE 4 TABLETS (40 MG) BY MOUTH DAILY FOR 5 DAYS THEN 3 TABLETS (30 MG) DAILY UNTIL GI CLINIC FOLLOW UP        PROBIOTIC ADVANCED PO           TYLENOL PO      Take 500 mg by mouth

## 2018-05-03 NOTE — PROGRESS NOTES
SUBJECTIVE:   Soo Timmons is a 37 year old female who presents to clinic today for the following health issues:    HPI  Acute Illness   Acute illness concerns: Cough  Onset: 3 mos    Fever: no    Chills/Sweats: no    Headache (location?): YES    Sinus Pressure:no    Conjunctivitis:  no    Ear Pain: no    Rhinorrhea: no    Congestion: YES    Sore Throat: no     Cough: YES    Wheeze: YES    Decreased Appetite: YES    Nausea: YES    Vomiting: no    Diarrhea:  no    Dysuria/Freq.: no    Fatigue/Achiness: no    Sick/Strep Exposure: no     Therapies Tried and outcome: Allegra & Benadryl    Problem list and histories reviewed & adjusted, as indicated.  Additional history: as documented    Patient presents to clinic with her daughter concerning a persistent cough that has become nasal drainage. She declined any steroids to treat. She is here to figure out what she can take OTC but she has been taking benadryl since onset. Otherwise, she is running and healthy.    Patient Active Problem List   Diagnosis     Migraine with aura     Fatigue     Surveillance of previously prescribed intrauterine contraceptive device     Anemia     CARDIOVASCULAR SCREENING; LDL GOAL LESS THAN 160     Health Care Home     Anxiety     Colitis     Mild major depression (H)     GERD (gastroesophageal reflux disease)     Crohn's ileitis (H)     Malabsorption     Inflammatory bowel disease (Crohn's disease) (H)     Iron deficiency anemia     Vitamin B 12 deficiency     Irritable bowel syndrome     Past Surgical History:   Procedure Laterality Date     COLONOSCOPY  10/04/06    [facility]      COLONOSCOPY  12/20/2013    Procedure: COMBINED COLONOSCOPY, SINGLE BIOPSY/POLYPECTOMY BY BIOPSY;;  Surgeon: Presley Ocampo MD;  Location:  OR      REMOVE INTRAUTERINE DEVICE  09/02/08      UGI ENDOSCOPY, SIMPLE EXAM  10/4/2006     LAPAROSCOPIC CHOLECYSTECTOMY  5/16/2011    Procedure:LAPAROSCOPIC CHOLECYSTECTOMY; Surgeon:LIYAH AVELAR;  Location:[facility] OR       Social History   Substance Use Topics     Smoking status: Never Smoker     Smokeless tobacco: Never Used     Alcohol use No     Family History   Problem Relation Age of Onset     Hypertension Mother      Alzheimer Disease Maternal Grandmother      Asthma No family hx of      C.A.D. No family hx of      DIABETES No family hx of      CEREBROVASCULAR DISEASE No family hx of      Breast Cancer No family hx of      Cancer - colorectal No family hx of      Prostate Cancer No family hx of      Alcohol/Drug No family hx of      Allergies No family hx of      Anesthesia Reaction No family hx of      Arthritis No family hx of      Blood Disease No family hx of      CANCER No family hx of      Circulatory No family hx of      Depression No family hx of      Endocrine Disease No family hx of      Eye Disorder No family hx of      Genetic Disorder No family hx of      Gynecology No family hx of      GASTROINTESTINAL DISEASE No family hx of      Genitourinary Problems No family hx of      HEART DISEASE No family hx of      Lipids No family hx of      Neurologic Disorder No family hx of      Obesity No family hx of      Hearing Loss No family hx of      Respiratory No family hx of      OSTEOPOROSIS No family hx of      Musculoskeletal Disorder No family hx of      Thyroid Disease No family hx of      Psychotic Disorder No family hx of      Cardiovascular No family hx of      Congenital Anomalies No family hx of      Connective Tissue Disorder No family hx of      Coronary Artery Disease No family hx of      Hyperlipidemia No family hx of      Ovarian Cancer No family hx of      Depression/Anxiety No family hx of      Thyroid Disease No family hx of      Chemical Addiction No family hx of      Known Genetic Syndrome No family hx of      Anxiety Disorder No family hx of      MENTAL ILLNESS No family hx of      Substance Abuse No family hx of      Colon Cancer No family hx of      Other Cancer No family hx of           Current Outpatient Prescriptions   Medication Sig Dispense Refill     Acetaminophen (TYLENOL PO) Take 500 mg by mouth       benzonatate (TESSALON) 200 MG capsule Take 1 capsule (200 mg) by mouth 3 times daily as needed for cough 21 capsule 0     busPIRone (BUSPAR) 5 MG tablet Start at 5 mg twice daily for 3 days, then 7.5 mg (1.5 tabs) twice daily for 3 days, then 10 mg (2 tabs) twice daily for 3 days, then 12.5 mg (2.5 tabs) twice daily for 3 days, then 15 mg (3 tabs) twice daily and stay at that dose 150 tablet 0     DiphenhydrAMINE HCl (BENADRYL PO)        FLUoxetine 20 MG tablet Take 2 tablets (40 mg) by mouth daily 180 tablet 1     hydrOXYzine (ATARAX) 25 MG tablet Take 0.5-1 tablets (12.5-25 mg) by mouth every 6 hours as needed for anxiety 20 tablet 1     MIRENA 20 MCG/24HR IU IUD intrauterine uterine device placed 1 Intra Uterine Device 0     multivitamin, therapeutic with minerals (MULTI-VITAMIN) TABS Take 1 tablet by mouth daily       pantoprazole (PROTONIX) 40 MG EC tablet Take 1 tablet (40 mg) by mouth daily 90 tablet 0     Probiotic Product (PROBIOTIC ADVANCED PO)        oxyCODONE IR (ROXICODONE) 5 MG tablet Take 0.5-1 tablets (2.5-5 mg) by mouth every 6 hours as needed for pain (Patient not taking: Reported on 5/4/2018) 20 tablet 0     predniSONE (DELTASONE) 10 MG tablet TAKE 4 TABLETS (40 MG) BY MOUTH DAILY FOR 5 DAYS THEN 3 TABLETS (30 MG) DAILY UNTIL GI CLINIC FOLLOW UP  0     Allergies   Allergen Reactions     Cimzia [Certolizumab Pegol] Other (See Comments)     Redness and swelling @ injection site     Humira Other (See Comments)     Inflammation, redness and swelling @ injection site     Remicade [Infliximab] Swelling     Banana      Anaphylaxis     Latex      Anaphylaxis     Prilosec [Omeprazole Magnesium] Diarrhea     Recent Labs   Lab Test  03/23/18   1210  03/12/18   1255  02/26/18   1312  01/02/18   2147  12/26/17   1910   05/22/17   0959   01/09/15   0816   06/24/13   1040  07/12/12    "0819   LDL   --    --    --    --    --    --   73   --    --    --    --   79   HDL   --    --    --    --    --    --   31*   --    --    --    --   28*   TRIG   --    --    --    --    --    --   75   --    --    --    --   83   ALT  41  22  30  14  13   < >   --    < >  9   < >  16   --    CR   --    --    --   0.90  0.80   < >   --    < >  0.86   < >  0.73   --    GFRESTIMATED   --    --    --   70  80   < >   --    < >  76   < >  >90   --    GFRESTBLACK   --    --    --   85  >90   < >   --    < >  >90   GFR Calc     < >  >90   --    POTASSIUM   --    --    --   3.4  4.0   < >   --    < >  3.4   < >  3.8   --    TSH   --    --    --    --    --    --    --    --   2.49   --   2.16  2.11    < > = values in this interval not displayed.      BP Readings from Last 3 Encounters:   05/04/18 98/70   04/20/18 112/73   03/23/18 113/76    Wt Readings from Last 3 Encounters:   05/04/18 137 lb 3.2 oz (62.2 kg)   04/20/18 139 lb 6.4 oz (63.2 kg)   03/23/18 137 lb 3.2 oz (62.2 kg)        ROS:  Constitutional, HEENT, cardiovascular, pulmonary, GI, , musculoskeletal, neuro, skin, endocrine and psych systems are negative, except as otherwise noted.    This document serves as a record of the services and decisions personally performed and made by Rossi Ambrose CNP. It was created on his/her behalf by Negin Cohen, trained medical scribe. The creation of this document is based the provider's statements to the medical scribes.    Valentino Cohen 8:33 AM, May 4, 2018  OBJECTIVE:     BP 98/70  Pulse 74  Temp 98.5  F (36.9  C) (Temporal)  Resp 16  Ht 5' 5.5\" (1.664 m)  Wt 137 lb 3.2 oz (62.2 kg)  SpO2 100%  BMI 22.48 kg/m2  Body mass index is 22.48 kg/(m^2).     GENERAL: healthy, alert and no distress  HENT: ear canals and TM's normal, nose and mouth without ulcers or lesions  NECK: no adenopathy, no asymmetry, masses, or scars and thyroid normal to palpation  RESP: lungs clear to auscultation - no " rales, rhonchi or wheezes  CV: regular rate and rhythm, normal S1 S2, no S3 or S4, no murmur, click or rub, no peripheral edema and peripheral pulses strong  ABDOMEN: soft, nontender, no hepatosplenomegaly, no masses and bowel sounds normal  Neuro/Psych: pleasant affect, no gross deficits.     Diagnostic Test Results:  No results found for this or any previous visit (from the past 24 hour(s)).    ASSESSMENT/PLAN:       ICD-10-CM    1. Acute bronchitis, unspecified organism J20.9 benzonatate (TESSALON) 200 MG capsule   2. Crohn's disease of small intestine with complication (H) K50.019      Reviewed symptoms and etiology patient presents today. Patient declined treating with steroids today. Advised starting Tessalon 200 mg TID for 7 days. Reviewed directions, benefits, and side effects of medication with patient. Rx provided today.    Crohn's stable, pt. prefers to not use steroids, I agree. Home symptomatic cares. Avoiding antibiotics as no fever.     Follow up with PCP if symptoms do not improve or worsen or PRN      The information in this document, created by the medical scribe for me, accurately reflects the services I personally performed and the decisions made by me. I have reviewed and approved this document for accuracy prior to leaving the patient care area.  Rossi Ambrose CNP  8:33 AM, 05/04/18    FRANCESCA Jones CNP  [facility]

## 2018-05-04 ENCOUNTER — OFFICE VISIT (OUTPATIENT)
Dept: FAMILY MEDICINE | Facility: CLINIC | Age: 38
End: 2018-05-04
Payer: COMMERCIAL

## 2018-05-04 VITALS
SYSTOLIC BLOOD PRESSURE: 98 MMHG | HEIGHT: 66 IN | RESPIRATION RATE: 16 BRPM | DIASTOLIC BLOOD PRESSURE: 70 MMHG | HEART RATE: 74 BPM | TEMPERATURE: 98.5 F | BODY MASS INDEX: 22.05 KG/M2 | OXYGEN SATURATION: 100 % | WEIGHT: 137.2 LBS

## 2018-05-04 DIAGNOSIS — J20.9 ACUTE BRONCHITIS, UNSPECIFIED ORGANISM: Primary | ICD-10-CM

## 2018-05-04 DIAGNOSIS — K50.019 CROHN'S DISEASE OF SMALL INTESTINE WITH COMPLICATION (H): ICD-10-CM

## 2018-05-04 PROCEDURE — 99214 OFFICE O/P EST MOD 30 MIN: CPT | Performed by: NURSE PRACTITIONER

## 2018-05-04 RX ORDER — BENZONATATE 200 MG/1
200 CAPSULE ORAL 3 TIMES DAILY PRN
Qty: 21 CAPSULE | Refills: 0 | Status: SHIPPED | OUTPATIENT
Start: 2018-05-04 | End: 2019-02-27

## 2018-05-04 ASSESSMENT — PAIN SCALES - GENERAL: PAINLEVEL: NO PAIN (0)

## 2018-05-04 NOTE — MR AVS SNAPSHOT
After Visit Summary   5/4/2018    Soo Timmons    MRN: [id number]           Patient Information     Date Of Birth          1980        Visit Information        Provider Department      5/4/2018 8:20 AM Rossi Ambrose APRN CNP [facility]        Today's Diagnoses     Acute bronchitis, unspecified organism    -  1    Crohn's disease of small intestine with complication (H)           Follow-ups after your visit        Your next 10 appointments already scheduled     Baljinder 15, 2018  9:00 AM CDT   Level 3 with NL INFUSION CHAIR 4   [facility] ([facility])    [address] [place] [address] [phone]              Who to contact     If you have questions or need follow up information about today's clinic visit or your schedule please contact [facility] directly at [phone].  Normal or non-critical lab and imaging results will be communicated to you by MongoHQhart, letter or phone within 4 business days after the clinic has received the results. If you do not hear from us within 7 days, please contact the clinic through MongoHQhart or phone. If you have a critical or abnormal lab result, we will notify you by phone as soon as possible.  Submit refill requests through Moneylib or call your pharmacy and they will forward the refill request to us. Please allow 3 business days for your refill to be completed.          Additional Information About Your Visit        MyChart Information     Moneylib gives you secure access to your electronic health record. If you see a primary care provider, you can also send messages to your care team and make appointments. If you have questions, please call your primary care clinic.  If you do not have a primary care provider, please call [phone] and they will assist you.        Care EveryWhere ID     This is your Care EveryWhere ID. This could be used by other organizations to access your  "[place] medical records  [id number]        Your Vitals Were     Pulse Temperature Respirations Height Pulse Oximetry BMI (Body Mass Index)    74 98.5  F (36.9  C) (Temporal) 16 5' 5.5\" (1.664 m) 100% 22.48 kg/m2       Blood Pressure from Last 3 Encounters:   05/04/18 98/70   04/20/18 112/73   03/23/18 113/76    Weight from Last 3 Encounters:   05/04/18 137 lb 3.2 oz (62.2 kg)   04/20/18 139 lb 6.4 oz (63.2 kg)   03/23/18 137 lb 3.2 oz (62.2 kg)              Today, you had the following     No orders found for display         Today's Medication Changes          These changes are accurate as of 5/4/18  4:39 PM.  If you have any questions, ask your nurse or doctor.               Start taking these medicines.        Dose/Directions    benzonatate 200 MG capsule   Commonly known as:  TESSALON   Used for:  Acute bronchitis, unspecified organism   Started by:  Rossi Ambrose APRN CNP        Dose:  200 mg   Take 1 capsule (200 mg) by mouth 3 times daily as needed for cough   Quantity:  21 capsule   Refills:  0            Where to get your medicines      These medications were sent to [id number] - [place], [place] - [address] [address]  [address] [address] [address]     Phone:  [phone]     benzonatate 200 MG capsule                Primary Care Provider Office Phone # Fax #    FRANCESCA Patiño [phone] [address] [address]        Goals        General    Psychosocial I will devlope a safety plan (pt-stated)     Notes - Note created  11/5/2015  3:13 PM by Magdalena Warren LSW    As of today's date 11/5/2015 goal is met at 0 - 25%.   Goal Status:  Active      Psychosocial/ I will go to talk with Lalita Watters (pt-stated)     Notes - Note created  11/5/2015  3:12 PM by Magdalena Warren LSW    As of today's date 11/5/2015 goal is met at 0 - 25%.   Goal Status:  Active        Equal Access to Services     DELORES COBIAN AH: Kathi Dunlap, " waalejada luqadaha, qaybta kaalmada mariana, hira maiaajose ah. So [facility] [phone].    ATENCIÓN: Si zhen vickers, tiene a meier disposición servicios gratuitos de asistencia lingüística. Felix al [phone].    We comply with applicable federal civil rights laws and [place] laws. We do not discriminate on the basis of race, color, national origin, age, disability, sex, sexual orientation, or gender identity.            Thank you!     Thank you for choosing [facility]  for your care. Our goal is always to provide you with excellent care. Hearing back from our patients is one way we can continue to improve our services. Please take a few minutes to complete the written survey that you may receive in the mail after your visit with us. Thank you!             Your Updated Medication List - Protect others around you: Learn how to safely use, store and throw away your medicines at www.disposemymeds.org.          This list is accurate as of 5/4/18  4:39 PM.  Always use your most recent med list.                   Brand Name Dispense Instructions for use Diagnosis    BENADRYL PO           benzonatate 200 MG capsule    TESSALON    21 capsule    Take 1 capsule (200 mg) by mouth 3 times daily as needed for cough    Acute bronchitis, unspecified organism       busPIRone 5 MG tablet    BUSPAR    150 tablet    Start at 5 mg twice daily for 3 days, then 7.5 mg (1.5 tabs) twice daily for 3 days, then 10 mg (2 tabs) twice daily for 3 days, then 12.5 mg (2.5 tabs) twice daily for 3 days, then 15 mg (3 tabs) twice daily and stay at that dose    JUJU (generalized anxiety disorder)       FLUoxetine 20 MG tablet     180 tablet    Take 2 tablets (40 mg) by mouth daily    Mild major depression (H), Crohn's disease of ileum with other complication (H)       hydrOXYzine 25 MG tablet    ATARAX    20 tablet    Take 0.5-1 tablets (12.5-25 mg) by mouth every 6 hours as needed for anxiety    JUJU (generalized  anxiety disorder)       MIRENA (52 MG) 20 MCG/24HR IUD   Generic drug:  levonorgestrel     1 Intra Uterine Device    intrauterine uterine device placed    Menorrhagia, Well woman exam with routine gynecological exam, Insertion of IUD, Contraception       Multi-vitamin Tabs tablet      Take 1 tablet by mouth daily        oxyCODONE IR 5 MG tablet    ROXICODONE    20 tablet    Take 0.5-1 tablets (2.5-5 mg) by mouth every 6 hours as needed for pain        pantoprazole 40 MG EC tablet    PROTONIX    90 tablet    Take 1 tablet (40 mg) by mouth daily    Crohn's disease of ileum with other complication (H)       predniSONE 10 MG tablet    DELTASONE     TAKE 4 TABLETS (40 MG) BY MOUTH DAILY FOR 5 DAYS THEN 3 TABLETS (30 MG) DAILY UNTIL GI CLINIC FOLLOW UP        PROBIOTIC ADVANCED PO           TYLENOL PO      Take 500 mg by mouth

## 2018-06-15 ENCOUNTER — INFUSION THERAPY VISIT (OUTPATIENT)
Dept: INFUSION THERAPY | Facility: CLINIC | Age: 38
End: 2018-06-15
Attending: FAMILY MEDICINE
Payer: COMMERCIAL

## 2018-06-15 VITALS
OXYGEN SATURATION: 100 % | DIASTOLIC BLOOD PRESSURE: 71 MMHG | HEART RATE: 73 BPM | RESPIRATION RATE: 16 BRPM | HEIGHT: 66 IN | TEMPERATURE: 97.9 F | SYSTOLIC BLOOD PRESSURE: 95 MMHG | WEIGHT: 138.5 LBS | BODY MASS INDEX: 22.26 KG/M2

## 2018-06-15 DIAGNOSIS — K50.019 CROHN'S DISEASE OF SMALL INTESTINE WITH COMPLICATION (H): Primary | ICD-10-CM

## 2018-06-15 LAB
ALBUMIN SERPL-MCNC: 3.9 G/DL (ref 3.4–5)
ALP SERPL-CCNC: 90 U/L (ref 40–150)
ALT SERPL W P-5'-P-CCNC: 26 U/L (ref 0–50)
AST SERPL W P-5'-P-CCNC: 20 U/L (ref 0–45)
BASOPHILS # BLD AUTO: 0 10E9/L (ref 0–0.2)
BASOPHILS NFR BLD AUTO: 0.4 %
BILIRUB DIRECT SERPL-MCNC: 0.2 MG/DL (ref 0–0.2)
BILIRUB SERPL-MCNC: 0.5 MG/DL (ref 0.2–1.3)
CRP SERPL-MCNC: <2.9 MG/L (ref 0–8)
DIFFERENTIAL METHOD BLD: ABNORMAL
EOSINOPHIL NFR BLD AUTO: 4.6 %
ERYTHROCYTE [DISTWIDTH] IN BLOOD BY AUTOMATED COUNT: 14.3 % (ref 10–15)
ERYTHROCYTE [SEDIMENTATION RATE] IN BLOOD BY WESTERGREN METHOD: 4 MM/H (ref 0–20)
HCT VFR BLD AUTO: 42.5 % (ref 35–47)
HGB BLD-MCNC: 13.3 G/DL (ref 11.7–15.7)
IMM GRANULOCYTES # BLD: 0 10E9/L (ref 0–0.4)
IMM GRANULOCYTES NFR BLD: 0.3 %
LYMPHOCYTES # BLD AUTO: 1.5 10E9/L (ref 0.8–5.3)
LYMPHOCYTES NFR BLD AUTO: 21.1 %
MCH RBC QN AUTO: 26.9 PG (ref 26.5–33)
MCHC RBC AUTO-ENTMCNC: 31.3 G/DL (ref 31.5–36.5)
MCV RBC AUTO: 86 FL (ref 78–100)
MONOCYTES # BLD AUTO: 0.5 10E9/L (ref 0–1.3)
MONOCYTES NFR BLD AUTO: 6.8 %
NEUTROPHILS # BLD AUTO: 4.8 10E9/L (ref 1.6–8.3)
NEUTROPHILS NFR BLD AUTO: 66.8 %
NRBC # BLD AUTO: 0 10*3/UL
NRBC BLD AUTO-RTO: 0 /100
PLATELET # BLD AUTO: 293 10E9/L (ref 150–450)
PROT SERPL-MCNC: 6.9 G/DL (ref 6.8–8.8)
RBC # BLD AUTO: 4.94 10E12/L (ref 3.8–5.2)
WBC # BLD AUTO: 7.2 10E9/L (ref 4–11)

## 2018-06-15 PROCEDURE — 96413 CHEMO IV INFUSION 1 HR: CPT

## 2018-06-15 PROCEDURE — 36415 COLL VENOUS BLD VENIPUNCTURE: CPT | Performed by: PHYSICIAN ASSISTANT

## 2018-06-15 PROCEDURE — 86140 C-REACTIVE PROTEIN: CPT | Performed by: PHYSICIAN ASSISTANT

## 2018-06-15 PROCEDURE — 85025 COMPLETE CBC W/AUTO DIFF WBC: CPT | Performed by: PHYSICIAN ASSISTANT

## 2018-06-15 PROCEDURE — 25000128 H RX IP 250 OP 636: Performed by: FAMILY MEDICINE

## 2018-06-15 PROCEDURE — 85652 RBC SED RATE AUTOMATED: CPT | Performed by: PHYSICIAN ASSISTANT

## 2018-06-15 PROCEDURE — 80076 HEPATIC FUNCTION PANEL: CPT | Performed by: PHYSICIAN ASSISTANT

## 2018-06-15 RX ADMIN — VEDOLIZUMAB 300 MG: 300 INJECTION, POWDER, LYOPHILIZED, FOR SOLUTION INTRAVENOUS at 09:58

## 2018-06-15 RX ADMIN — SODIUM CHLORIDE 250 ML: 9 INJECTION, SOLUTION INTRAVENOUS at 09:30

## 2018-06-15 ASSESSMENT — PAIN SCALES - GENERAL: PAINLEVEL: NO PAIN (0)

## 2018-06-15 NOTE — PATIENT INSTRUCTIONS
Pt to return on 08/10/18 for Entyvio. Copies of medication list and upcoming appointments given prior to discharge.

## 2018-06-15 NOTE — MR AVS SNAPSHOT
After Visit Summary   6/15/2018    Soo Timmons    MRN: [id number]           Patient Information     Date Of Birth          1980        Visit Information        Provider Department      6/15/2018 9:00 AM NL INFUSION CHAIR 4 [facility]fusion [facility]        Today's Diagnoses     Crohn's disease of small intestine with complication (H)    -  1      Care Instructions    Pt to return on 08/10/18 for Entyvio. Copies of medication list and upcoming appointments given prior to discharge.             Follow-ups after your visit        Follow-up notes from your care team     Return in about 8 weeks (around 8/10/2018).      Your next 10 appointments already scheduled     Aug 10, 2018  9:00 AM CDT   Level 3 with NL INFUSION CHAIR 1   [facility]fusion [facility] ([facility])    [address] [place] [address] [phone]              Who to contact     If you have questions or need follow up information about today's clinic visit or your schedule please contact [facility] INFUSION SERVICES directly at [phone].  Normal or non-critical lab and imaging results will be communicated to you by Bubblyhart, letter or phone within 4 business days after the clinic has received the results. If you do not hear from us within 7 days, please contact the clinic through Bubblyhart or phone. If you have a critical or abnormal lab result, we will notify you by phone as soon as possible.  Submit refill requests through [email or web address] or call your pharmacy and they will forward the refill request to us. Please allow 3 business days for your refill to be completed.          Additional Information About Your Visit        Bubblyhart Information     [email or web address] gives you secure access to your electronic health record. If you see a primary care provider, you can also send messages to your care team and make appointments. If you have questions, please call your primary care clinic.  If  "you do not have a primary care provider, please call [phone] and they will assist you.        Care EveryWhere ID     This is your Care EveryWhere ID. This could be used by other organizations to access your [place] medical records  [id number]        Your Vitals Were     Pulse Temperature Respirations Height Pulse Oximetry BMI (Body Mass Index)    73 97.9  F (36.6  C) (Temporal) 16 1.664 m (5' 5.5\") 100% 22.7 kg/m2       Blood Pressure from Last 3 Encounters:   06/15/18 95/71   05/04/18 98/70   04/20/18 112/73    Weight from Last 3 Encounters:   06/15/18 62.8 kg (138 lb 8 oz)   05/04/18 62.2 kg (137 lb 3.2 oz)   04/20/18 63.2 kg (139 lb 6.4 oz)              We Performed the Following     CBC with platelets differential     CRP inflammation     Erythrocyte sedimentation rate auto     Hepatic panel        Primary Care Provider Office Phone # Fax #    RossiFRANCESCA Trejo [facility] [phone]       [address] [address] [facility] [address]        Goals        General    Psychosocial I will devlope a safety plan (pt-stated)     Notes - Note created  11/5/2015  3:13 PM by Magdalena Warren LSW    As of today's date 11/5/2015 goal is met at 0 - 25%.   Goal Status:  Active      Psychosocial/ I will go to talk with Lalita Watters (pt-stated)     Notes - Note created  11/5/2015  3:12 PM by Magdalena Warren LSW    As of today's date 11/5/2015 goal is met at 0 - 25%.   Goal Status:  Active        Equal Access to Services     DELORES COBIAN : Hadii fidel Dunlap, waaxda luqecho, qaybta dinorahalhira chavez. So [facility] [phone].    ATENCIÓN: Si habla español, tiene a meier disposición servicios gratuitos de asistencia lingüística. Llame al [phone].    We comply with applicable federal civil rights laws and [place] laws. We do not discriminate on the basis of race, color, national origin, age, disability, sex, sexual orientation, or gender identity.          "   Thank you!     Thank you for choosing [facility] INFUSION SERVICES  for your care. Our goal is always to provide you with excellent care. Hearing back from our patients is one way we can continue to improve our services. Please take a few minutes to complete the written survey that you may receive in the mail after your visit with us. Thank you!             Your Updated Medication List - Protect others around you: Learn how to safely use, store and throw away your medicines at www.disposemymeds.org.          This list is accurate as of 6/15/18  1:30 PM.  Always use your most recent med list.                   Brand Name Dispense Instructions for use Diagnosis    BENADRYL PO           benzonatate 200 MG capsule    TESSALON    21 capsule    Take 1 capsule (200 mg) by mouth 3 times daily as needed for cough    Acute bronchitis, unspecified organism       busPIRone 5 MG tablet    BUSPAR    150 tablet    Start at 5 mg twice daily for 3 days, then 7.5 mg (1.5 tabs) twice daily for 3 days, then 10 mg (2 tabs) twice daily for 3 days, then 12.5 mg (2.5 tabs) twice daily for 3 days, then 15 mg (3 tabs) twice daily and stay at that dose    JUJU (generalized anxiety disorder)       FLUoxetine 20 MG tablet     180 tablet    Take 2 tablets (40 mg) by mouth daily    Mild major depression (H), Crohn's disease of ileum with other complication (H)       hydrOXYzine 25 MG tablet    ATARAX    20 tablet    Take 0.5-1 tablets (12.5-25 mg) by mouth every 6 hours as needed for anxiety    JUJU (generalized anxiety disorder)       MIRENA (52 MG) 20 MCG/24HR IUD   Generic drug:  levonorgestrel     1 Intra Uterine Device    intrauterine uterine device placed    Menorrhagia, Well woman exam with routine gynecological exam, Insertion of IUD, Contraception       Multi-vitamin Tabs tablet      Take 1 tablet by mouth daily        oxyCODONE IR 5 MG tablet    ROXICODONE    20 tablet    Take 0.5-1 tablets (2.5-5 mg) by mouth every 6 hours as  needed for pain        pantoprazole 40 MG EC tablet    PROTONIX    90 tablet    Take 1 tablet (40 mg) by mouth daily    Crohn's disease of ileum with other complication (H)       predniSONE 10 MG tablet    DELTASONE     TAKE 4 TABLETS (40 MG) BY MOUTH DAILY FOR 5 DAYS THEN 3 TABLETS (30 MG) DAILY UNTIL GI CLINIC FOLLOW UP        PROBIOTIC ADVANCED PO           TYLENOL PO      Take 500 mg by mouth

## 2018-06-15 NOTE — PROGRESS NOTES
Infusion Nursing Note:  Soo Timmons presents today for Entyvio.    Patient seen by provider today: No   present during visit today: Not Applicable.    Note: Labs drawn today.    Intravenous Access:  Peripheral IV placed.    Treatment Conditions:  Lab Results   Component Value Date    HGB 13.3 06/15/2018     Lab Results   Component Value Date    WBC 7.2 06/15/2018      Lab Results   Component Value Date    ANEU 4.8 06/15/2018     Lab Results   Component Value Date     06/15/2018      Lab Results   Component Value Date     01/02/2018                   Lab Results   Component Value Date    POTASSIUM 3.4 01/02/2018           Lab Results   Component Value Date    MAG 2.1 02/14/2014            Lab Results   Component Value Date    CR 0.90 01/02/2018                   Lab Results   Component Value Date    RUDOLPH 7.6 01/02/2018                Lab Results   Component Value Date    BILITOTAL 0.5 06/15/2018           Lab Results   Component Value Date    ALBUMIN 3.9 06/15/2018                    Lab Results   Component Value Date    ALT 26 06/15/2018           Lab Results   Component Value Date    AST 20 06/15/2018           Post Infusion Assessment:  Patient tolerated infusion without incident.  Blood return noted pre and post infusion.  Site patent and intact, free from redness, edema or discomfort.  No evidence of extravasations.  Access discontinued per protocol.    Discharge Plan:   Patient and/or family verbalized understanding of discharge instructions and all questions answered.  Patient discharged in stable condition accompanied by: self.  Departure Mode: Ambulatory.    Jasmyn Thompson RN

## 2018-07-27 LAB
C TRACH DNA SPEC QL PROBE+SIG AMP: NORMAL
N GONORRHOEA DNA SPEC QL PROBE+SIG AMP: NORMAL
SPECIMEN DESCRIP: NORMAL
SPECIMEN DESCRIPTION: NORMAL

## 2018-08-10 ENCOUNTER — INFUSION THERAPY VISIT (OUTPATIENT)
Dept: INFUSION THERAPY | Facility: CLINIC | Age: 38
End: 2018-08-10
Attending: FAMILY MEDICINE
Payer: COMMERCIAL

## 2018-08-10 VITALS
WEIGHT: 142.3 LBS | HEIGHT: 66 IN | SYSTOLIC BLOOD PRESSURE: 115 MMHG | BODY MASS INDEX: 22.87 KG/M2 | HEART RATE: 69 BPM | TEMPERATURE: 98 F | DIASTOLIC BLOOD PRESSURE: 68 MMHG | RESPIRATION RATE: 16 BRPM | OXYGEN SATURATION: 98 %

## 2018-08-10 DIAGNOSIS — K50.019 CROHN'S DISEASE OF SMALL INTESTINE WITH COMPLICATION (H): Primary | ICD-10-CM

## 2018-08-10 PROCEDURE — 96365 THER/PROPH/DIAG IV INF INIT: CPT

## 2018-08-10 PROCEDURE — 96413 CHEMO IV INFUSION 1 HR: CPT

## 2018-08-10 PROCEDURE — 25000128 H RX IP 250 OP 636: Performed by: FAMILY MEDICINE

## 2018-08-10 RX ADMIN — SODIUM CHLORIDE 250 ML: 9 INJECTION, SOLUTION INTRAVENOUS at 08:57

## 2018-08-10 RX ADMIN — VEDOLIZUMAB 300 MG: 300 INJECTION, POWDER, LYOPHILIZED, FOR SOLUTION INTRAVENOUS at 09:15

## 2018-08-10 ASSESSMENT — PAIN SCALES - GENERAL: PAINLEVEL: MODERATE PAIN (4)

## 2018-08-10 NOTE — MR AVS SNAPSHOT
After Visit Summary   8/10/2018    Soo Timmons    MRN: [id number]           Patient Information     Date Of Birth          1980        Visit Information        Provider Department      8/10/2018 9:00 AM NL INFUSION CHAIR 1 [facility]fusion [facility]        Today's Diagnoses     Crohn's disease of small intestine with complication (H)    -  1      Care Instructions    Pt to return on 10/05/18 for Entyvio. Copies of medication list and upcoming appointments given prior to discharge.             Follow-ups after your visit        Your next 10 appointments already scheduled     Oct 05, 2018  9:00 AM CDT   Level 3 with NL INFUSION CHAIR 5   [facility] Infusion [facility] ([facility])    [address] [place] [address] [phone]              Who to contact     If you have questions or need follow up information about today's clinic visit or your schedule please contact [facility] INFUSION SERVICES directly at [phone].  Normal or non-critical lab and imaging results will be communicated to you by Asantihart, letter or phone within 4 business days after the clinic has received the results. If you do not hear from us within 7 days, please contact the clinic through SpotFodot or phone. If you have a critical or abnormal lab result, we will notify you by phone as soon as possible.  Submit refill requests through Quandoo or call your pharmacy and they will forward the refill request to us. Please allow 3 business days for your refill to be completed.          Additional Information About Your Visit        MyChart Information     Quandoo gives you secure access to your electronic health record. If you see a primary care provider, you can also send messages to your care team and make appointments. If you have questions, please call your primary care clinic.  If you do not have a primary care provider, please call [phone] and they will assist  "you.        Care EveryWhere ID     This is your Care EveryWhere ID. This could be used by other organizations to access your [place] medical records  [id number]        Your Vitals Were     Pulse Temperature Respirations Height Pulse Oximetry BMI (Body Mass Index)    69 98  F (36.7  C) (Temporal) 16 1.664 m (5' 5.5\") 98% 23.32 kg/m2       Blood Pressure from Last 3 Encounters:   08/10/18 115/68   06/15/18 95/71   05/04/18 98/70    Weight from Last 3 Encounters:   08/10/18 64.5 kg (142 lb 4.8 oz)   06/15/18 62.8 kg (138 lb 8 oz)   05/04/18 62.2 kg (137 lb 3.2 oz)              Today, you had the following     No orders found for display       Primary Care Provider Office Phone # Fax #    Rossi FRANCESCA Wiseman [facility] [phone] [address] [facility] [address]        Goals        General    Psychosocial I will devlope a safety plan (pt-stated)     Notes - Note created  11/5/2015  3:13 PM by Magdalena Warren LSW    As of today's date 11/5/2015 goal is met at 0 - 25%.   Goal Status:  Active      Psychosocial/ I will go to talk with Lalita Watters (pt-stated)     Notes - Note created  11/5/2015  3:12 PM by Magdalena Warren LSW    As of today's date 11/5/2015 goal is met at 0 - 25%.   Goal Status:  Active        Equal Access to Services     [facility] AH: Hadii aad ku hadasho Soomaali, waaxda luqadaha, qaybta kaalmada adeegyada, hira jordan kseniachapin silverman. So [facility] [phone].    ATENCIÓN: Si habla español, tiene a meier disposición servicios gratuitos de asistencia lingüística. Llame al [phone].    We comply with applicable federal civil rights laws and [place] laws. We do not discriminate on the basis of race, color, national origin, age, disability, sex, sexual orientation, or gender identity.            Thank you!     Thank you for choosing [facility]  for your care. Our goal is always to provide you with excellent care. Hearing back from our " patients is one way we can continue to improve our services. Please take a few minutes to complete the written survey that you may receive in the mail after your visit with us. Thank you!             Your Updated Medication List - Protect others around you: Learn how to safely use, store and throw away your medicines at www.disposemymeds.org.          This list is accurate as of 8/10/18  2:47 PM.  Always use your most recent med list.                   Brand Name Dispense Instructions for use Diagnosis    BENADRYL PO           benzonatate 200 MG capsule    TESSALON    21 capsule    Take 1 capsule (200 mg) by mouth 3 times daily as needed for cough    Acute bronchitis, unspecified organism       busPIRone 5 MG tablet    BUSPAR    150 tablet    Start at 5 mg twice daily for 3 days, then 7.5 mg (1.5 tabs) twice daily for 3 days, then 10 mg (2 tabs) twice daily for 3 days, then 12.5 mg (2.5 tabs) twice daily for 3 days, then 15 mg (3 tabs) twice daily and stay at that dose    JUJU (generalized anxiety disorder)       FLUoxetine 20 MG tablet     180 tablet    Take 2 tablets (40 mg) by mouth daily    Mild major depression (H), Crohn's disease of ileum with other complication (H)       hydrOXYzine 25 MG tablet    ATARAX    20 tablet    Take 0.5-1 tablets (12.5-25 mg) by mouth every 6 hours as needed for anxiety    JUJU (generalized anxiety disorder)       MIRENA (52 MG) 20 MCG/24HR IUD   Generic drug:  levonorgestrel     1 Intra Uterine Device    intrauterine uterine device placed    Menorrhagia, Well woman exam with routine gynecological exam, Insertion of IUD, Contraception       Multi-vitamin Tabs tablet      Take 1 tablet by mouth daily        oxyCODONE IR 5 MG tablet    ROXICODONE    20 tablet    Take 0.5-1 tablets (2.5-5 mg) by mouth every 6 hours as needed for pain        pantoprazole 40 MG EC tablet    PROTONIX    90 tablet    Take 1 tablet (40 mg) by mouth daily    Crohn's disease of ileum with other complication  (H)       predniSONE 10 MG tablet    DELTASONE     TAKE 4 TABLETS (40 MG) BY MOUTH DAILY FOR 5 DAYS THEN 3 TABLETS (30 MG) DAILY UNTIL GI CLINIC FOLLOW UP        PROBIOTIC ADVANCED PO           TYLENOL PO      Take 500 mg by mouth

## 2018-08-10 NOTE — PROGRESS NOTES
Infusion Nursing Note:  [facility]    Patient seen by provider today: No   present during visit today: Not Applicable.    Note: Patient starts to feel symptoms around week 6 of the 8 she needs to wait in between.    Intravenous Access:    PIV placed.    Treatment Conditions:  Not Applicable.      Post Infusion Assessment:  Patient tolerated infusion without incident except slightly nauseated at end of infusion. Patient states she has Zofran at home that she will try. We will monitor next infusion for nausea.     Discharge Plan:   Discharge instructions reviewed with: Patient and Family.  Patient and/or family verbalized understanding of discharge instructions and all questions answered.  Patient discharged in stable condition accompanied by: self and son.  Departure Mode: Ambulatory.    Jasmyn Thompson RN

## 2018-08-10 NOTE — PATIENT INSTRUCTIONS
Pt to return on 10/05/18 for Entyvio. Copies of medication list and upcoming appointments given prior to discharge.

## 2018-10-05 ENCOUNTER — INFUSION THERAPY VISIT (OUTPATIENT)
Dept: INFUSION THERAPY | Facility: CLINIC | Age: 38
End: 2018-10-05
Attending: FAMILY MEDICINE
Payer: COMMERCIAL

## 2018-10-05 VITALS
RESPIRATION RATE: 16 BRPM | DIASTOLIC BLOOD PRESSURE: 67 MMHG | BODY MASS INDEX: 22.79 KG/M2 | TEMPERATURE: 98.2 F | WEIGHT: 141.8 LBS | HEIGHT: 66 IN | SYSTOLIC BLOOD PRESSURE: 99 MMHG | HEART RATE: 71 BPM | OXYGEN SATURATION: 98 %

## 2018-10-05 DIAGNOSIS — K50.019 CROHN'S DISEASE OF SMALL INTESTINE WITH COMPLICATION (H): Primary | ICD-10-CM

## 2018-10-05 LAB
ALBUMIN SERPL-MCNC: 3.5 G/DL (ref 3.4–5)
ALP SERPL-CCNC: 92 U/L (ref 40–150)
ALT SERPL W P-5'-P-CCNC: 16 U/L (ref 0–50)
AST SERPL W P-5'-P-CCNC: 10 U/L (ref 0–45)
BASOPHILS # BLD AUTO: 0 10E9/L (ref 0–0.2)
BASOPHILS NFR BLD AUTO: 0.1 %
BILIRUB DIRECT SERPL-MCNC: 0.1 MG/DL (ref 0–0.2)
BILIRUB SERPL-MCNC: 0.5 MG/DL (ref 0.2–1.3)
CRP SERPL-MCNC: 73.2 MG/L (ref 0–8)
DIFFERENTIAL METHOD BLD: NORMAL
EOSINOPHIL NFR BLD AUTO: 2.1 %
ERYTHROCYTE [DISTWIDTH] IN BLOOD BY AUTOMATED COUNT: 13.6 % (ref 10–15)
ERYTHROCYTE [SEDIMENTATION RATE] IN BLOOD BY WESTERGREN METHOD: 19 MM/H (ref 0–20)
HCG UR QL: NEGATIVE
HCT VFR BLD AUTO: 39.8 % (ref 35–47)
HGB BLD-MCNC: 13 G/DL (ref 11.7–15.7)
IMM GRANULOCYTES # BLD: 0 10E9/L (ref 0–0.4)
IMM GRANULOCYTES NFR BLD: 0.4 %
LYMPHOCYTES # BLD AUTO: 1.7 10E9/L (ref 0.8–5.3)
LYMPHOCYTES NFR BLD AUTO: 17.1 %
MCH RBC QN AUTO: 28.8 PG (ref 26.5–33)
MCHC RBC AUTO-ENTMCNC: 32.7 G/DL (ref 31.5–36.5)
MCV RBC AUTO: 88 FL (ref 78–100)
MONOCYTES # BLD AUTO: 0.8 10E9/L (ref 0–1.3)
MONOCYTES NFR BLD AUTO: 7.8 %
NEUTROPHILS # BLD AUTO: 7.2 10E9/L (ref 1.6–8.3)
NEUTROPHILS NFR BLD AUTO: 72.5 %
NRBC # BLD AUTO: 0 10*3/UL
NRBC BLD AUTO-RTO: 0 /100
PLATELET # BLD AUTO: 252 10E9/L (ref 150–450)
PROT SERPL-MCNC: 7.2 G/DL (ref 6.8–8.8)
RBC # BLD AUTO: 4.52 10E12/L (ref 3.8–5.2)
WBC # BLD AUTO: 9.9 10E9/L (ref 4–11)

## 2018-10-05 PROCEDURE — 36415 COLL VENOUS BLD VENIPUNCTURE: CPT | Performed by: PHYSICIAN ASSISTANT

## 2018-10-05 PROCEDURE — 96413 CHEMO IV INFUSION 1 HR: CPT

## 2018-10-05 PROCEDURE — 80076 HEPATIC FUNCTION PANEL: CPT | Performed by: PHYSICIAN ASSISTANT

## 2018-10-05 PROCEDURE — 85652 RBC SED RATE AUTOMATED: CPT | Performed by: PHYSICIAN ASSISTANT

## 2018-10-05 PROCEDURE — 85025 COMPLETE CBC W/AUTO DIFF WBC: CPT | Performed by: PHYSICIAN ASSISTANT

## 2018-10-05 PROCEDURE — 86140 C-REACTIVE PROTEIN: CPT | Performed by: PHYSICIAN ASSISTANT

## 2018-10-05 PROCEDURE — 25000128 H RX IP 250 OP 636: Performed by: FAMILY MEDICINE

## 2018-10-05 PROCEDURE — 81025 URINE PREGNANCY TEST: CPT | Performed by: FAMILY MEDICINE

## 2018-10-05 RX ADMIN — SODIUM CHLORIDE 250 ML: 9 INJECTION, SOLUTION INTRAVENOUS at 09:35

## 2018-10-05 RX ADMIN — VEDOLIZUMAB 300 MG: 300 INJECTION, POWDER, LYOPHILIZED, FOR SOLUTION INTRAVENOUS at 09:58

## 2018-10-05 NOTE — MR AVS SNAPSHOT
After Visit Summary   10/5/2018    Soo Timmons    MRN: [id number]           Patient Information     Date Of Birth          1980        Visit Information        Provider Department      10/5/2018 9:00 AM NL INFUSION CHAIR 5 [facility] Infusion [facility]        Today's Diagnoses     Crohn's disease of small intestine with complication (H)    -  1      Care Instructions    Pt to return on 11/30/18 for Entyvio. Copies of medication list and upcoming appointments given prior to discharge.             Follow-ups after your visit        Your next 10 appointments already scheduled     Nov 30, 2018  9:30 AM CST   Level 3 with NL INFUSION CHAIR 1   [facility] Infusion [facility] ([facility])    [address] [place] [address] [phone]              Who to contact     If you have questions or need follow up information about today's clinic visit or your schedule please contact [facility] INFUSION SERVICES directly at [phone].  Normal or non-critical lab and imaging results will be communicated to you by Fulcrum Bioenergyhart, letter or phone within 4 business days after the clinic has received the results. If you do not hear from us within 7 days, please contact the clinic through Fulcrum Bioenergyhart or phone. If you have a critical or abnormal lab result, we will notify you by phone as soon as possible.  Submit refill requests through Blue Perch or call your pharmacy and they will forward the refill request to us. Please allow 3 business days for your refill to be completed.          Additional Information About Your Visit        Fulcrum Bioenergyhart Information     Blue Perch gives you secure access to your electronic health record. If you see a primary care provider, you can also send messages to your care team and make appointments. If you have questions, please call your primary care clinic.  If you do not have a primary care provider, please call [phone] and they will assist  "you.        Care EveryWhere ID     This is your Care EveryWhere ID. This could be used by other organizations to access your [place] medical records  [id number]        Your Vitals Were     Pulse Temperature Respirations Height Pulse Oximetry BMI (Body Mass Index)    71 98.2  F (36.8  C) (Temporal) 16 1.664 m (5' 5.5\") 98% 23.24 kg/m2       Blood Pressure from Last 3 Encounters:   10/05/18 99/67   08/10/18 115/68   06/15/18 95/71    Weight from Last 3 Encounters:   10/05/18 64.3 kg (141 lb 12.8 oz)   08/10/18 64.5 kg (142 lb 4.8 oz)   06/15/18 62.8 kg (138 lb 8 oz)              We Performed the Following     CBC with platelets differential     CRP inflammation     Erythrocyte sedimentation rate auto     HCG qualitative urine     Hepatic panel        Primary Care Provider Office Phone # Fax #    Rossi FRANCESCA Wiseman [facility] [phone]       [address] [facility] [address]        Goals        General    Psychosocial I will devlope a safety plan (pt-stated)     Notes - Note created  11/5/2015  3:13 PM by Magdalena Warren LSW    As of today's date 11/5/2015 goal is met at 0 - 25%.   Goal Status:  Active      Psychosocial/ I will go to talk with Lalita Watters (pt-stated)     Notes - Note created  11/5/2015  3:12 PM by Magdalena Warren LSW    As of today's date 11/5/2015 goal is met at 0 - 25%.   Goal Status:  Active        Equal Access to Services     [facility] : Hadii fidel sofiao Sosusan, waaxda luqadaha, qaybta kaalmagricel guadarramaprabhjot idiin hayaan adeeg kharash la'aan . So [facility] [phone].    ATENCIÓN: Si habla español, tiene a meier disposición servicios gratuitos de asistencia lingüística. Llame al [phone].    We comply with applicable federal civil rights laws and [place] laws. We do not discriminate on the basis of race, color, national origin, age, disability, sex, sexual orientation, or gender identity.            Thank you!     Thank you for choosing [facility] INFUSION SERVICES  for your care. Our goal is always to provide you with excellent care. Hearing back from our patients is one way we can continue to improve our services. Please take a few minutes to complete the written survey that you may receive in the mail after your visit with us. Thank you!             Your Updated Medication List - Protect others around you: Learn how to safely use, store and throw away your medicines at www.disposemymeds.org.          This list is accurate as of 10/5/18 11:09 AM.  Always use your most recent med list.                   Brand Name Dispense Instructions for use Diagnosis    BENADRYL PO           benzonatate 200 MG capsule    TESSALON    21 capsule    Take 1 capsule (200 mg) by mouth 3 times daily as needed for cough    Acute bronchitis, unspecified organism       busPIRone 5 MG tablet    BUSPAR    150 tablet    Start at 5 mg twice daily for 3 days, then 7.5 mg (1.5 tabs) twice daily for 3 days, then 10 mg (2 tabs) twice daily for 3 days, then 12.5 mg (2.5 tabs) twice daily for 3 days, then 15 mg (3 tabs) twice daily and stay at that dose    JUJU (generalized anxiety disorder)       FLUoxetine 20 MG tablet     180 tablet    Take 2 tablets (40 mg) by mouth daily    Mild major depression (H), Crohn's disease of ileum with other complication (H)       hydrOXYzine 25 MG tablet    ATARAX    20 tablet    Take 0.5-1 tablets (12.5-25 mg) by mouth every 6 hours as needed for anxiety    JUJU (generalized anxiety disorder)       MIRENA (52 MG) 20 MCG/24HR IUD   Generic drug:  levonorgestrel     1 Intra Uterine Device    intrauterine uterine device placed    Menorrhagia, Well woman exam with routine gynecological exam, Insertion of IUD, Contraception       Multi-vitamin Tabs tablet      Take 1 tablet by mouth daily        oxyCODONE IR 5 MG tablet    ROXICODONE    20 tablet    Take 0.5-1 tablets (2.5-5 mg) by mouth every 6 hours as needed for pain        pantoprazole 40 MG EC tablet     PROTONIX    90 tablet    Take 1 tablet (40 mg) by mouth daily    Crohn's disease of ileum with other complication (H)       predniSONE 10 MG tablet    DELTASONE     TAKE 4 TABLETS (40 MG) BY MOUTH DAILY FOR 5 DAYS THEN 3 TABLETS (30 MG) DAILY UNTIL GI CLINIC FOLLOW UP        PROBIOTIC ADVANCED PO           TYLENOL PO      Take 500 mg by mouth

## 2018-10-05 NOTE — PROGRESS NOTES
Infusion Nursing Note:  Soo NOONAN Shanelle presents today for Entjeovanny.    Patient seen by provider today: No   present during visit today: Not Applicable.    Note: Patient c/o nausea and vomiting and skipped menses. Patient requesting a pregnancy test, order received from Rossi Ambrose. Test results negative, ok to proceed with treatment.    Intravenous Access:  Peripheral IV placed.    Treatment Conditions:  Results reviewed, labs MET treatment parameters, ok to proceed with treatment.      Post Infusion Assessment:  Patient tolerated infusion without incident.  Patient observed for 15 minutes post infusion per protocol.  Blood return noted pre and post infusion.  No evidence of extravasations.  Access discontinued per protocol.    Discharge Plan:   Patient and/or family verbalized understanding of discharge instructions and all questions answered.  Patient discharged in stable condition accompanied by: self and friend.  Departure Mode: Ambulatory.    Jasmyn Thompson RN

## 2018-10-05 NOTE — PATIENT INSTRUCTIONS
Pt to return on 11/30/18 for Entyvio. Copies of medication list and upcoming appointments given prior to discharge.

## 2018-11-30 DIAGNOSIS — K50.90 CROHN'S DISEASE (H): Primary | ICD-10-CM

## 2018-12-06 ENCOUNTER — INFUSION THERAPY VISIT (OUTPATIENT)
Dept: INFUSION THERAPY | Facility: CLINIC | Age: 38
End: 2018-12-06
Attending: NURSE PRACTITIONER
Payer: MEDICAID

## 2018-12-06 VITALS
DIASTOLIC BLOOD PRESSURE: 67 MMHG | HEART RATE: 77 BPM | WEIGHT: 141 LBS | RESPIRATION RATE: 16 BRPM | SYSTOLIC BLOOD PRESSURE: 108 MMHG | OXYGEN SATURATION: 99 % | HEIGHT: 66 IN | TEMPERATURE: 98.2 F | BODY MASS INDEX: 22.66 KG/M2

## 2018-12-06 DIAGNOSIS — K50.019 CROHN'S DISEASE OF SMALL INTESTINE WITH COMPLICATION (H): Primary | ICD-10-CM

## 2018-12-06 PROCEDURE — 96365 THER/PROPH/DIAG IV INF INIT: CPT

## 2018-12-06 PROCEDURE — 96413 CHEMO IV INFUSION 1 HR: CPT

## 2018-12-06 PROCEDURE — 25000128 H RX IP 250 OP 636: Performed by: FAMILY MEDICINE

## 2018-12-06 RX ADMIN — SODIUM CHLORIDE 250 ML: 9 INJECTION, SOLUTION INTRAVENOUS at 14:29

## 2018-12-06 RX ADMIN — VEDOLIZUMAB 300 MG: 300 INJECTION, POWDER, LYOPHILIZED, FOR SOLUTION INTRAVENOUS at 14:33

## 2018-12-06 ASSESSMENT — PAIN SCALES - GENERAL: PAINLEVEL: SEVERE PAIN (6)

## 2018-12-06 NOTE — PROGRESS NOTES
Infusion Nursing Note:  Soo Timmons presents today for EntTidePooljeovanny.    Patient seen by provider today: No   present during visit today: Not Applicable.    Note: Labs done on 11/30/18.    Intravenous Access:  Peripheral IV placed.    Treatment Conditions:  Not Applicable.      Post Infusion Assessment:  Patient tolerated infusion without incident.  Patient observed for 15 minutes post infusion per protocol.  Blood return noted pre and post infusion.  Site patent and intact, free from redness, edema or discomfort.  No evidence of extravasations.  Access discontinued per protocol.    Discharge Plan:   Discharge instructions reviewed with: Patient.  Patient and/or family verbalized understanding of discharge instructions and all questions answered.  Patient discharged in stable condition accompanied by: self.  Departure Mode: Ambulatory.    Jasmyn Thompson RN

## 2018-12-06 NOTE — MR AVS SNAPSHOT
After Visit Summary   12/6/2018    Soo Timmons    MRN: [id number]           Patient Information     Date Of Birth          1980        Visit Information        Provider Department      12/6/2018 2:00 PM NL INFUSION CHAIR 3 [facility]        Today's Diagnoses     Crohn's disease of small intestine with complication (H)    -  1      Care Instructions    Pt to return on 02/01/19 for Entyvio. Copies of medication list and upcoming appointments given prior to discharge.             Follow-ups after your visit        Your next 10 appointments already scheduled     Feb 01, 2019  9:00 AM CST   Level 2 with NL INFUSION CHAIR 5   [facility] ([facility])    [address] [place] [address] [phone]              Who to contact     If you have questions or need follow up information about today's clinic visit or your schedule please contact [facility] directly at [phone].  Normal or non-critical lab and imaging results will be communicated to you by [employer], letter or phone within 4 business days after the clinic has received the results. If you do not hear from us within 7 days, please contact the clinic through [employer] [employer] or phone. If you have a critical or abnormal lab result, we will notify you by phone as soon as possible.  Submit refill requests through [employer] or call your pharmacy and they will forward the refill request to us. Please allow 3 business days for your refill to be completed.          Additional Information About Your Visit        [employer] Information     [employer] gives you secure access to your electronic health record. If you see a primary care provider, you can also send messages to your care team and make appointments. If you have questions, please call your primary care clinic.  If you do not have a primary care provider, please call [phone] and they will assist  "you.        Care EveryWhere ID     This is your Care EveryWhere ID. This could be used by other organizations to access your [place] medical records  [id number]        Your Vitals Were     Pulse Temperature Respirations Height Pulse Oximetry BMI (Body Mass Index)    77 98.2  F (36.8  C) (Temporal) 16 1.664 m (5' 5.5\") 99% 23.11 kg/m2       Blood Pressure from Last 3 Encounters:   12/06/18 108/67   10/05/18 99/67   08/10/18 115/68    Weight from Last 3 Encounters:   12/06/18 64 kg (141 lb)   10/05/18 64.3 kg (141 lb 12.8 oz)   08/10/18 64.5 kg (142 lb 4.8 oz)              Today, you had the following     No orders found for display       Primary Care Provider Office Phone # Fax #    Rossi FRANCESCA Wiseman [facility] [phone]       [address] [address] [id number]        Goals        General    Psychosocial I will devlope a safety plan (pt-stated)     Notes - Note created  11/5/2015  3:13 PM by Magdalena Warren LSW    As of today's date 11/5/2015 goal is met at 0 - 25%.   Goal Status:  Active      Psychosocial/ I will go to talk with Lalita Watters (pt-stated)     Notes - Note created  11/5/2015  3:12 PM by Magdalena Warren LSW    As of today's date 11/5/2015 goal is met at 0 - 25%.   Goal Status:  Active        Equal Access to Services     [facility] AH: Hadii aad ku hadasho Soomaali, waaxda luqadaha, qaybta kaalmada adeegyada, hira silverman. So [facility] [phone].    ATENCIÓN: Si habla español, tiene a meier disposición servicios gratuitos de asistencia lingüística. Llame al [phone].    We comply with applicable federal civil rights laws and [place] laws. We do not discriminate on the basis of race, color, national origin, age, disability, sex, sexual orientation, or gender identity.            Thank you!     Thank you for choosing [facility]  for your care. Our goal is always to provide you with excellent care. Hearing back from our " patients is one way we can continue to improve our services. Please take a few minutes to complete the written survey that you may receive in the mail after your visit with us. Thank you!             Your Updated Medication List - Protect others around you: Learn how to safely use, store and throw away your medicines at www.disposemymeds.org.          This list is accurate as of 12/6/18  5:19 PM.  Always use your most recent med list.                   Brand Name Dispense Instructions for use Diagnosis    BENADRYL PO           benzonatate 200 MG capsule    TESSALON    21 capsule    Take 1 capsule (200 mg) by mouth 3 times daily as needed for cough    Acute bronchitis, unspecified organism       busPIRone 5 MG tablet    BUSPAR    150 tablet    Start at 5 mg twice daily for 3 days, then 7.5 mg (1.5 tabs) twice daily for 3 days, then 10 mg (2 tabs) twice daily for 3 days, then 12.5 mg (2.5 tabs) twice daily for 3 days, then 15 mg (3 tabs) twice daily and stay at that dose    JUJU (generalized anxiety disorder)       FLUoxetine 20 MG tablet     180 tablet    Take 2 tablets (40 mg) by mouth daily    Mild major depression (H), Crohn's disease of ileum with other complication (H)       hydrOXYzine 25 MG tablet    ATARAX    20 tablet    Take 0.5-1 tablets (12.5-25 mg) by mouth every 6 hours as needed for anxiety    JUJU (generalized anxiety disorder)       MIRENA (52 MG) 20 MCG/24HR IUD   Generic drug:  levonorgestrel     1 Intra Uterine Device    intrauterine uterine device placed    Menorrhagia, Well woman exam with routine gynecological exam, Insertion of IUD, Contraception       Multi-vitamin tablet      Take 1 tablet by mouth daily        OMEGA ESSENTIALS BASIC PO      Take by mouth daily        pantoprazole 40 MG EC tablet    PROTONIX    90 tablet    Take 1 tablet (40 mg) by mouth daily    Crohn's disease of ileum with other complication (H)       predniSONE 10 MG tablet    DELTASONE     TAKE 4 TABLETS (40 MG) BY MOUTH  DAILY FOR 5 DAYS THEN 3 TABLETS (30 MG) DAILY UNTIL GI CLINIC FOLLOW UP        PROBIOTIC ADVANCED PO           TYLENOL PO      Take 500 mg by mouth        VITAMIN D (CHOLECALCIFEROL) PO      Take 1,000 Units by mouth 2 times daily

## 2018-12-06 NOTE — PATIENT INSTRUCTIONS
Pt to return on 02/01/19 for Entyvio. Copies of medication list and upcoming appointments given prior to discharge.

## 2019-01-22 NOTE — ED PROVIDER NOTES
[facility] ED Provider Note   CC:     Chief Complaint   Patient presents with     Abdominal Pain     HPI:  Soo Timmons is a 37 year old female who presented to the emergency department with acute onset of urinary tract symptoms associated with bilateral flank pain, urinary frequency, urgency, nausea and vomiting and low-grade fever.  Patient has a history of small bowel Crohn's disease, and has been seeing specialist at the [place] [facility], with current workup in progress.  She had an MR enterography last week, and is scheduled for a double prep colonoscopy since it was not successful a month ago.  She is not on any current medications for her Crohn's as they are waiting to decide after the colonoscopy.  She has failed biologic agents in the past, and had been on steroids but the plan is to keep her off the steroids.  Patient states that her abdominal pain consists of her usual Crohn's disease pain which is located in the midepigastric region.  She also has bilateral flank and lower quadrant abdominal pain.  She started to have increased pain last night, and this morning, but try to push through the day.  She started to have some vomiting, and presents now with pain that is described as both sharp and crampy rated 8/10.  There is been no alleviating factors.  She states she has low-grade fever of 101  earlier today and feels chilled.  She has been prone to urinary tract and particularly pyelonephritis.  He states there is no possibility of pregnancy as she has an IUD, and her  has had a vasectomy.  Patient has been making urine today, but feels thirsty.  She does not feel like she could drink as much as she needs.  She has no other significant past medical history and no previous abdominal surgeries.  She was here in July, and states that the medication she received then helped tremendously during her Crohn's  flareup.    Problem List:  Patient Active Problem List    Diagnosis     Health Care Home     Irritable bowel syndrome     Vitamin B 12 deficiency     Inflammatory bowel disease (Crohn's disease) (H)     Iron deficiency anemia     Malabsorption     Crohn's ileitis (H)     GERD (gastroesophageal reflux disease)     Mild major depression (H)     Colitis     Anxiety     CARDIOVASCULAR SCREENING; LDL GOAL LESS THAN 160     Surveillance of previously prescribed intrauterine contraceptive device     Anemia     Fatigue     Migraine with aura       MEDS:   Discharge Medication List as of 12/26/2017  9:18 PM      CONTINUE these medications which have NOT CHANGED    Details   albuterol (PROAIR HFA/PROVENTIL HFA/VENTOLIN HFA) 108 (90 BASE) MCG/ACT Inhaler Inhale 2 puffs into the lungs every 6 hours as needed for shortness of breath / dyspnea or wheezing, Disp-1 Inhaler, R-0, E-Prescribe      FLUoxetine (PROZAC) 40 MG capsule Take 1 capsule (40 mg) by mouth daily, Disp-90 capsule, R-1, E-Prescribe      benzonatate (TESSALON) 200 MG capsule Take 1 capsule (200 mg) by mouth 3 times daily as needed for cough, Disp-21 capsule, R-0, E-Prescribe      azithromycin (ZITHROMAX) 250 MG tablet 2 tablets daily on day one, then one tablet po daily until gone., Disp-6 tablet, R-0, E-Prescribe      busPIRone (BUSPAR) 5 MG tablet Start at 5 mg twice daily for 3 days, then 7.5 mg (1.5 tabs) twice daily for 3 days, then 10 mg (2 tabs) twice daily for 3 days, then 12.5 mg (2.5 tabs) twice daily for 3 days, then 15 mg (3 tabs) twice daily and stay at that dose, Disp-150 tablet, R-0,  Local Print      hydrOXYzine (ATARAX) 25 MG tablet Take 0.5-1 tablets (12.5-25 mg) by mouth every 6 hours as needed for anxiety, Disp-20 tablet, R-1, E-Prescribe      oxyCODONE-acetaminophen (PERCOCET) 5-325 MG per tablet Take 1 tablet by mouth every 6 hours as needed, Disp-10 tablet, R-0, Local Print      Probiotic Product (PROBIOTIC ADVANCED PO) Historical     "  clonazePAM (KLONOPIN) 0.5 MG tablet Take 0.5-1 tablets (0.25-0.5 mg) by mouth daily as needed for anxiety, Disp-10 tablet, R-0, Local Print      multivitamin, therapeutic with minerals (MULTI-VITAMIN) TABS Take 1 tablet by mouth daily, Historical      MIRENA 20 MCG/24HR IU IUD intrauterine uterine device placed, Disp-1 Intra Uterine Device, R-0, No Print Out             ALLERGIES:    Allergies   Allergen Reactions     Banana      Anaphylaxis     Latex      Anaphylaxis     Prilosec [Omeprazole Magnesium] Diarrhea       Past Surgical History:   Procedure Laterality Date     COLONOSCOPY  10/04/06    [facility]      COLONOSCOPY  12/20/2013    Procedure: COMBINED COLONOSCOPY, SINGLE BIOPSY/POLYPECTOMY BY BIOPSY;;  Surgeon: Presley Ocampo MD;  Location: [facility] OR     HC REMOVE INTRAUTERINE DEVICE  09/02/08     HC UGI ENDOSCOPY, SIMPLE EXAM  10/4/2006     LAPAROSCOPIC CHOLECYSTECTOMY  5/16/2011    Procedure:LAPAROSCOPIC CHOLECYSTECTOMY; Surgeon:LIYAH AVELAR; Location:[facility] OR       Social History   Substance Use Topics     Smoking status: Never Smoker     Smokeless tobacco: Never Used     Alcohol use No      Comment: rare         Review of Systems   Except as noted in HPI, all other systems were reviewed and are negative    Physical Exam     Vitals were reviewed  Patient Vitals for the past 8 hrs:   BP Temp Temp src Pulse Resp SpO2 Height Weight   12/26/17 2130 110/74 - - 88 18 96 % - -   12/26/17 1858 114/73 97.7  F (36.5  C) Oral 96 16 98 % 1.702 m (5' 7\") 54.9 kg (121 lb)     GENERAL APPEARANCE: Alert, mild to moderate distress due to pain  FACE: normal facies  EYES: Pupils are equal; no scleral icterus  HENT: normal external exam  NECK: no adenopathy or asymmetry  RESP: normal respiratory effort; clear breath sounds bilaterally  CV: regular rate and rhythm; no significant murmurs, gallops or rubs  ABD: soft, epigastric and left CVA greater than right CVA tenderness; no rebound or guarding; bowel sounds are " present  MS: no gross deformities noted; normal muscle tone.  EXT: No calf tenderness or pitting edema  SKIN: no worrisome rash  NEURO: no facial droop; no focal deficits, speech is normal  PSYCH: normal mood and affect      Available Lab/Imaging Results     Results for orders placed or performed during the hospital encounter of 12/26/17 (from the past 24 hour(s))   UA with Microscopic   Result Value Ref Range    Color Urine Yellow     Appearance Urine Slightly Cloudy     Glucose Urine Negative NEG^Negative mg/dL    Bilirubin Urine Negative NEG^Negative    Ketones Urine 20 (A) NEG^Negative mg/dL    Specific Gravity Urine 1.026 1.003 - 1.035    Blood Urine Negative NEG^Negative    pH Urine 5.0 5.0 - 7.0 pH    Protein Albumin Urine Negative NEG^Negative mg/dL    Urobilinogen mg/dL 0.0 0.0 - 2.0 mg/dL    Nitrite Urine Negative NEG^Negative    Leukocyte Esterase Urine Trace (A) NEG^Negative    Source Midstream Urine     WBC Urine 6 (H) 0 - 2 /HPF    RBC Urine 2 0 - 2 /HPF    Squamous Epithelial /HPF Urine 7 (H) 0 - 1 /HPF    Mucous Urine Present (A) NEG^Negative /LPF   HCG qualitative urine   Result Value Ref Range    HCG Qual Urine Negative NEG^Negative   CBC with platelets differential   Result Value Ref Range    WBC 7.1 4.0 - 11.0 10e9/L    RBC Count 5.06 3.8 - 5.2 10e12/L    Hemoglobin 13.7 11.7 - 15.7 g/dL    Hematocrit 44.1 35.0 - 47.0 %    MCV 87 78 - 100 fl    MCH 27.1 26.5 - 33.0 pg    MCHC 31.1 (L) 31.5 - 36.5 g/dL    RDW 14.8 10.0 - 15.0 %    Platelet Count 456 (H) 150 - 450 10e9/L    Diff Method Automated Method     % Neutrophils 62.2 %    % Lymphocytes 25.1 %    % Monocytes 10.3 %    % Eosinophils 2.0 %    % Basophils 0.3 %    % Immature Granulocytes 0.1 %    Absolute Neutrophil 4.4 1.6 - 8.3 10e9/L    Absolute Lymphocytes 1.8 0.8 - 5.3 10e9/L    Absolute Monocytes 0.7 0.0 - 1.3 10e9/L    Absolute Eosinophils 0.1 0.0 - 0.7 10e9/L    Absolute Basophils 0.0 0.0 - 0.2 10e9/L    Abs Immature Granulocytes 0.0 0  - 0.4 10e9/L   Comprehensive metabolic panel   Result Value Ref Range    Sodium 141 133 - 144 mmol/L    Potassium 4.0 3.4 - 5.3 mmol/L    Chloride 108 94 - 109 mmol/L    Carbon Dioxide 26 20 - 32 mmol/L    Anion Gap 7 3 - 14 mmol/L    Glucose 89 70 - 99 mg/dL    Urea Nitrogen 9 7 - 30 mg/dL    Creatinine 0.80 0.52 - 1.04 mg/dL    GFR Estimate 80 >60 mL/min/1.7m2    GFR Estimate If Black >90 >60 mL/min/1.7m2    Calcium 8.0 (L) 8.5 - 10.1 mg/dL    Bilirubin Total 0.5 0.2 - 1.3 mg/dL    Albumin 2.5 (L) 3.4 - 5.0 g/dL    Protein Total 5.6 (L) 6.8 - 8.8 g/dL    Alkaline Phosphatase 88 40 - 150 U/L    ALT 13 0 - 50 U/L    AST 13 0 - 45 U/L   CRP inflammation   Result Value Ref Range    CRP Inflammation 19.1 (H) 0.0 - 8.0 mg/L         Impression     Final diagnoses:   Abdominal pain, epigastric and bilateral flank   Urinary frequency       ED Course & Medical Decision Making   Soo Timmons is a 37 year old female who presented to the emergency department with acute urinary symptoms associated with low-grade fever, bilateral flank pain left side worse than right and nausea and vomiting.  She has a history of Crohn's disease with additional workup in progress.  Patient states that her midepigastric abdominal pain is typical for the Crohn's disease, but not the flank or lower abdominal pains.  She has been having urinary frequency with urgency and has a significant history for pyelonephritis.  Patient was seen shortly after arrival.  She felt dehydrated, and received a liter of normal saline.  Her urinalysis revealed positive ketones with 6 white blood cells, 2 red blood cells, but 7 squamous epithelial cells.  A urine culture is pending.  The patient's CBC revealed a normal white blood count of 7.1 with 62% neutrophils.  Platelet count however is elevated.  Competency metabolic panel is reassuring although her albumin level is on the low side.  She has normal BUN, creatinine, and electrolytes.  CRP is elevated at 19.1.   Patient felt better after receiving IV fluids, Zofran and Dilaudid.  She has a history of pyelonephritis, but her urinalysis is relatively benign.  Given that she has a fever with acute urinary symptoms, patient will be treated for presumptive UTI with Cipro.  Continue Zofran for nausea, and oxycodone sparingly for pain.  Recheck in 2-3 days if not improving.  Return to the ED at any time if symptoms worsen.  Patient was able to tolerate a oral fluid challenge.  She is scheduled for a double prep colonoscopy next Tuesday.      Written after-visit summary and instructions were given at the time of discharge.      Discharge Medication List as of 12/26/2017  9:18 PM      START taking these medications    Details   ciprofloxacin (CIPRO) 500 MG tablet Take 1 tablet (500 mg) by mouth 2 times daily for 7 days, Disp-14 tablet, R-0, E-Prescribe      ondansetron (ZOFRAN ODT) 4 MG ODT tab Take 1 tablet (4 mg) by mouth every 6 hours as needed for nausea, Disp-8 tablet, R-0, E-Prescribe      oxyCODONE IR (ROXICODONE) 5 MG tablet Take 0.5-1 tablets (2.5-5 mg) by mouth every 6 hours as needed for pain, Disp-20 tablet, R-0, Local Print               This note was completed in part using Dragon voice recognition, and may contain word and grammatical errors.        Arya Mondragon MD  12/27/17 6284     B positive

## 2019-02-01 ENCOUNTER — INFUSION THERAPY VISIT (OUTPATIENT)
Dept: INFUSION THERAPY | Facility: CLINIC | Age: 39
End: 2019-02-01
Attending: PHYSICIAN ASSISTANT
Payer: COMMERCIAL

## 2019-02-01 VITALS
HEART RATE: 60 BPM | SYSTOLIC BLOOD PRESSURE: 102 MMHG | RESPIRATION RATE: 16 BRPM | TEMPERATURE: 98.4 F | DIASTOLIC BLOOD PRESSURE: 63 MMHG | OXYGEN SATURATION: 100 % | WEIGHT: 136.5 LBS | BODY MASS INDEX: 22.37 KG/M2

## 2019-02-01 DIAGNOSIS — K50.019 CROHN'S DISEASE OF SMALL INTESTINE WITH COMPLICATION (H): Primary | ICD-10-CM

## 2019-02-01 PROCEDURE — 96413 CHEMO IV INFUSION 1 HR: CPT

## 2019-02-01 PROCEDURE — 96365 THER/PROPH/DIAG IV INF INIT: CPT

## 2019-02-01 PROCEDURE — 25000128 H RX IP 250 OP 636: Performed by: FAMILY MEDICINE

## 2019-02-01 RX ADMIN — SODIUM CHLORIDE 250 ML: 9 INJECTION, SOLUTION INTRAVENOUS at 09:10

## 2019-02-01 RX ADMIN — VEDOLIZUMAB 300 MG: 300 INJECTION, POWDER, LYOPHILIZED, FOR SOLUTION INTRAVENOUS at 09:16

## 2019-02-01 ASSESSMENT — PAIN SCALES - GENERAL: PAINLEVEL: NO PAIN (0)

## 2019-02-01 NOTE — PROGRESS NOTES
Infusion Nursing Note:  Soo SARAN Timmons presents today for Entyvio.    Patient seen by provider today: No   present during visit today: Not Applicable.    Note: N/A.    Intravenous Access:  Peripheral IV placed.    Treatment Conditions:  Not Applicable.      Post Infusion Assessment:  Patient tolerated infusion without incident.  Patient observed for 15 minutes post entyvio per protocol.  Blood return noted pre and post infusion.  No evidence of extravasations.  Access discontinued per protocol.    Discharge Plan:   Discharge instructions reviewed with: Patient.  Patient and/or family verbalized understanding of discharge instructions and all questions answered.  Copy of AVS reviewed with patient and/or family.  Patient will return 4/5/19 for next appointment.  Patient discharged in stable condition accompanied by: self.  Departure Mode: Ambulatory.    Lady Warren RN

## 2019-02-11 ENCOUNTER — ANCILLARY PROCEDURE (OUTPATIENT)
Dept: GENERAL RADIOLOGY | Facility: CLINIC | Age: 39
End: 2019-02-11
Attending: NURSE PRACTITIONER
Payer: COMMERCIAL

## 2019-02-11 ENCOUNTER — OFFICE VISIT (OUTPATIENT)
Dept: FAMILY MEDICINE | Facility: CLINIC | Age: 39
End: 2019-02-11
Payer: COMMERCIAL

## 2019-02-11 VITALS
DIASTOLIC BLOOD PRESSURE: 82 MMHG | RESPIRATION RATE: 12 BRPM | SYSTOLIC BLOOD PRESSURE: 118 MMHG | WEIGHT: 132.9 LBS | TEMPERATURE: 99.3 F | HEART RATE: 62 BPM | BODY MASS INDEX: 21.36 KG/M2 | HEIGHT: 66 IN

## 2019-02-11 DIAGNOSIS — M54.6 ACUTE THORACIC BACK PAIN, UNSPECIFIED BACK PAIN LATERALITY: ICD-10-CM

## 2019-02-11 DIAGNOSIS — M54.2 NECK PAIN: Primary | ICD-10-CM

## 2019-02-11 DIAGNOSIS — S06.0X0A CONCUSSION WITHOUT LOSS OF CONSCIOUSNESS, INITIAL ENCOUNTER: ICD-10-CM

## 2019-02-11 PROCEDURE — 72070 X-RAY EXAM THORAC SPINE 2VWS: CPT | Mod: FY

## 2019-02-11 PROCEDURE — 99214 OFFICE O/P EST MOD 30 MIN: CPT | Performed by: NURSE PRACTITIONER

## 2019-02-11 RX ORDER — CYCLOBENZAPRINE HCL 5 MG
5-10 TABLET ORAL 3 TIMES DAILY PRN
Qty: 20 TABLET | Refills: 0 | Status: SHIPPED | OUTPATIENT
Start: 2019-02-11 | End: 2019-03-08

## 2019-02-11 ASSESSMENT — ANXIETY QUESTIONNAIRES
5. BEING SO RESTLESS THAT IT IS HARD TO SIT STILL: SEVERAL DAYS
GAD7 TOTAL SCORE: 4
4. TROUBLE RELAXING: SEVERAL DAYS
7. FEELING AFRAID AS IF SOMETHING AWFUL MIGHT HAPPEN: NOT AT ALL
1. FEELING NERVOUS, ANXIOUS, OR ON EDGE: NOT AT ALL
3. WORRYING TOO MUCH ABOUT DIFFERENT THINGS: NOT AT ALL
7. FEELING AFRAID AS IF SOMETHING AWFUL MIGHT HAPPEN: NOT AT ALL
2. NOT BEING ABLE TO STOP OR CONTROL WORRYING: SEVERAL DAYS
6. BECOMING EASILY ANNOYED OR IRRITABLE: SEVERAL DAYS

## 2019-02-11 ASSESSMENT — PATIENT HEALTH QUESTIONNAIRE - PHQ9
SUM OF ALL RESPONSES TO PHQ QUESTIONS 1-9: 6
SUM OF ALL RESPONSES TO PHQ QUESTIONS 1-9: 6
10. IF YOU CHECKED OFF ANY PROBLEMS, HOW DIFFICULT HAVE THESE PROBLEMS MADE IT FOR YOU TO DO YOUR WORK, TAKE CARE OF THINGS AT HOME, OR GET ALONG WITH OTHER PEOPLE: VERY DIFFICULT

## 2019-02-11 ASSESSMENT — MIFFLIN-ST. JEOR: SCORE: 1304.58

## 2019-02-11 ASSESSMENT — PAIN SCALES - GENERAL: PAINLEVEL: SEVERE PAIN (6)

## 2019-02-11 NOTE — LETTER
[facility]  [address], [address]  [place] [address]  Phone: [phone]  Fax: [phone]    February 11, 2019      To Whom It May Concern:    Soo Timmons, 1980, is under my care for a concussion that occurred on 2/8/19.  She is not permitted to participate in any sport or occupational activity until formally cleared for partial return.      Current attendance restrictions: No work Thursday -potentially Friday, then half days as tolerated.        Full or partial days missed due to post-concussion symptoms should be medically excused.    Follow up evaluation and revision of recommendations to occur 1 week.    Please feel free to contact me at the number above with any questions or concerns.    Sincerely,       Rossi Ambrose RN, CNP
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
## 2019-02-11 NOTE — PATIENT INSTRUCTIONS
Neck Spasm Rehabilitation Exercises   You may do all of these exercises right away but avoid any movements that increase your pain.     Neck rotation with flexion:   Right: Turn your head to the right and clasp your hands behind your head. Let the weight of your arms pull your chin to the right side of your chest. Relax. Hold for a count of 15. Do this 3 times.   Left: Turn your head to the left and clasp your hands behind your head. Let the weight of your arms pull your chin to the left side of your chest. Relax. Hold for a count of 15. Do this 3 times.     Chin tuck: Place your fingertips on your chin and gently push your head straight back as if you are trying to make a double chin. Keep looking forward as your head moves back. Hold 5 seconds and repeat 5 times.     Scalene stretch: This stretches the neck muscles that attach to your ribs. Sitting in an upright position, clasp both hands behind your back, lower your left shoulder, and tilt your head toward the right. Hold this position for 15 to 30 seconds and then come back to the starting position. Lower your right shoulder and tilt your head toward the left until you feel a stretch. Hold for 15 to 30 seconds. Repeat 3 times on each side.     Neck rotation stretch   Right side: Rotate your neck by looking over your right shoulder. Lift your right hand and place your palm on the left side of your chin. Push your chin with your palm toward your right shoulder. Hold for a count of 10. Do this 3 times.   Left side: Rotate your neck by looking over your left shoulder. Lift your left hand and place your palm on the right side of your chin. Push your chin with your palm toward your left shoulder. Hold for a count of 10. Do this 3 times.     Scapular squeeze: While sitting or standing with your arms by your sides, squeeze your shoulder blades together and hold for 5 seconds. Do 3 sets of 10.     Thoracic extension: While sitting in a chair, clasp both arms  behind your head. Gently arch backward and look up toward the ceiling. Repeat 10 times. Do this several times per day.                      Concussion  What is a concussion?   A concussion is an injury to the brain caused by a blow to the head. A concussion may cause you to become temporarily confused or disoriented, have memory loss (amnesia), or become unconscious.   Concussions are the most common head injuries in sports.   How does it occur?   A concussion occurs when a blow to the head causes shaking, jarring, stretching, swelling, or tearing of brain tissue and delicate nerve fibers.   What are the symptoms?   If you have had a concussion you may have any of the following symptoms:   headache   confusion   memory loss (amnesia)   loss of consciousness   sleepiness   nausea or vomiting   trouble thinking   dizziness   weakness   seizures   loss of balance   blurred vision   sensitivity to light   You may have these symptoms for several days, weeks, or longer after the injury. This is called post-concussive syndrome.   If your neck hurts after a head injury, it is best to try not to move more than is necessary until it is checked by a healthcare provider. Anyone with a possibly serious neck injury should not move at all and an ambulance should be called.   How is it diagnosed?   Your healthcare provider will examine you and find out what happened. If you can't remember what happened, he or she may need to get this information from other people saw the accident. Your healthcare provider will:   do a neurologic exam   test your strength   test your memory   test your sensation, balance, and reflexes   check your vision   look at your eyes with a flashlight to see if your pupils are the same size   You may be tested again several times during the next hour to check for any worsening of brain function, which might occur if you have any bleeding or swelling in the brain.   Your provider may do a CT scan or an MRI of  your head to be sure there is no damage to your brain. Depending on how your head injury occurred, you may have neck X-rays to check your spine.   How is it treated?   The treatment for a concussion is rest. This means you may need to miss school, work, or other activities. Exercising too soon will make your symptoms last longer and may cause more problems. Limit activities that require thinking and concentration, such as, working on a computer or playing video games. Your brain needs to rest.   Headache may be treated with a mild pain reliever, such as acetaminophen. Nausea may be treated with a prescription medicine. Clear liquids or bland foods may be helpful.   If you have had a concussion, you need to be watched by a friend or relative for 8 to 12 hours. You should be awakened and checked every 2 to 4 hours while sleeping. Symptoms to report to your healthcare provider include:   confusion   seizures   unequal pupil sizes   restlessness or irritability   trouble using your legs or arms   worsening vomiting   headache that will not go away after taking acetaminophen (Tylenol)   garbled speech   bleeding from the ears or nose   decreasing alertness   unusual sleepiness or hard to wake up   a change in personality   If you are stable and recovering during the next 24 hours, you should rest for an another day or two. As your symptoms go away, you can begin to go back to your usual daily routine. However, you should stay away from any activities that would risk reinjury. A second concussion before the first one has healed could be very serious. Your healthcare provider will tell you when it is safe to return to sports and other activities.   How can I prevent a concussion?   It is very important to understand that receiving a second blow to the head before the first injury is fully healed can be fatal, even if the second injury seems minor.   Using proper equipment (such as helmets and seat belts) and following  proper techniques in sports such as football and soccer are the best ways to prevent concussions.     Published by [employer].  This content is reviewed periodically and is subject to change as new health information becomes available. The information is intended to inform and educate and is not a replacement for medical evaluation, advice, diagnosis or treatment by a healthcare professional.   Written by Sandor Kaur MD, and Dania Goode MD, for [employer].   ? 2010 [facility] and/or its affiliates. All Rights Reserved.   Copyright   [employer] 2011    Graduated return to play protocol after concussion  Rehabilitation stage  Functional exercise at each stage of rehabilitation  Objective of each stage    1. No activity Complete physical and cognitive rest Recovery   2. Light aerobic exercise Walking, swimming or stationary cycling keeping intensity <70 percent HR; no resistance training Increase HR   3. Sport-specific exercise Skating drills in ice hockey, running drills in soccer; no head impact activities Add movement   4. Non-contact training drills Progression to more complex training drills, eg, passing drills in football and ice hockey; may start progressive resistance training Exercise, coordination, and cognitive load   5. Full contact practice Following medical clearance, participate in normal training activities Restore confidence and assess functional skills by coaching staff   6. Return to play Normal game play    Six-day return to play protocol. Each day the athlete makes a stepwise increase in functional activity, is evaluated for symptoms, and is allowed to progress to the next stage each successive day if asymptomatic.   Clin J Sport Med 2009; 19:185. Copyright   2009 [employer].

## 2019-02-12 ASSESSMENT — ANXIETY QUESTIONNAIRES: GAD7 TOTAL SCORE: 4

## 2019-02-12 ASSESSMENT — PATIENT HEALTH QUESTIONNAIRE - PHQ9: SUM OF ALL RESPONSES TO PHQ QUESTIONS 1-9: 6

## 2019-02-15 ENCOUNTER — THERAPY VISIT (OUTPATIENT)
Dept: PHYSICAL THERAPY | Facility: CLINIC | Age: 39
End: 2019-02-15
Attending: NURSE PRACTITIONER
Payer: COMMERCIAL

## 2019-02-15 DIAGNOSIS — S06.0X0A CONCUSSION WITHOUT LOSS OF CONSCIOUSNESS, INITIAL ENCOUNTER: ICD-10-CM

## 2019-02-15 DIAGNOSIS — M54.6 ACUTE THORACIC BACK PAIN, UNSPECIFIED BACK PAIN LATERALITY: ICD-10-CM

## 2019-02-15 DIAGNOSIS — M54.2 NECK PAIN: ICD-10-CM

## 2019-02-15 PROCEDURE — 97161 PT EVAL LOW COMPLEX 20 MIN: CPT | Mod: GP | Performed by: PHYSICAL THERAPIST

## 2019-02-15 PROCEDURE — 97110 THERAPEUTIC EXERCISES: CPT | Mod: GP | Performed by: PHYSICAL THERAPIST

## 2019-02-15 PROCEDURE — 97140 MANUAL THERAPY 1/> REGIONS: CPT | Mod: GP | Performed by: PHYSICAL THERAPIST
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
## 2019-02-21 ENCOUNTER — OFFICE VISIT (OUTPATIENT)
Dept: FAMILY MEDICINE | Facility: CLINIC | Age: 39
End: 2019-02-21
Payer: COMMERCIAL

## 2019-02-21 VITALS
OXYGEN SATURATION: 100 % | TEMPERATURE: 98.3 F | DIASTOLIC BLOOD PRESSURE: 62 MMHG | WEIGHT: 132.4 LBS | SYSTOLIC BLOOD PRESSURE: 90 MMHG | HEART RATE: 60 BPM | RESPIRATION RATE: 16 BRPM | BODY MASS INDEX: 21.37 KG/M2

## 2019-02-21 DIAGNOSIS — M54.2 NECK PAIN: ICD-10-CM

## 2019-02-21 DIAGNOSIS — M54.6 MIDLINE THORACIC BACK PAIN, UNSPECIFIED CHRONICITY: ICD-10-CM

## 2019-02-21 DIAGNOSIS — S06.0X0D CONCUSSION WITHOUT LOSS OF CONSCIOUSNESS, SUBSEQUENT ENCOUNTER: Primary | ICD-10-CM

## 2019-02-21 PROCEDURE — 99213 OFFICE O/P EST LOW 20 MIN: CPT | Performed by: NURSE PRACTITIONER

## 2019-02-21 ASSESSMENT — PAIN SCALES - GENERAL: PAINLEVEL: MODERATE PAIN (5)
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
## 2019-02-22 ENCOUNTER — THERAPY VISIT (OUTPATIENT)
Dept: PHYSICAL THERAPY | Facility: CLINIC | Age: 39
End: 2019-02-22
Payer: COMMERCIAL

## 2019-02-22 DIAGNOSIS — M54.2 NECK PAIN: ICD-10-CM

## 2019-02-22 DIAGNOSIS — M54.6 ACUTE THORACIC BACK PAIN, UNSPECIFIED BACK PAIN LATERALITY: ICD-10-CM

## 2019-02-22 PROCEDURE — 97140 MANUAL THERAPY 1/> REGIONS: CPT | Mod: GP | Performed by: PHYSICAL THERAPIST

## 2019-02-27 ENCOUNTER — OFFICE VISIT (OUTPATIENT)
Dept: FAMILY MEDICINE | Facility: CLINIC | Age: 39
End: 2019-02-27
Payer: COMMERCIAL

## 2019-02-27 VITALS
HEIGHT: 66 IN | SYSTOLIC BLOOD PRESSURE: 110 MMHG | WEIGHT: 132.4 LBS | TEMPERATURE: 98.2 F | BODY MASS INDEX: 21.28 KG/M2 | DIASTOLIC BLOOD PRESSURE: 68 MMHG | RESPIRATION RATE: 18 BRPM | HEART RATE: 81 BPM | OXYGEN SATURATION: 100 %

## 2019-02-27 DIAGNOSIS — S06.0X0D CONCUSSION WITHOUT LOSS OF CONSCIOUSNESS, SUBSEQUENT ENCOUNTER: Primary | ICD-10-CM

## 2019-02-27 DIAGNOSIS — K50.019 CROHN'S DISEASE OF SMALL INTESTINE WITH COMPLICATION (H): ICD-10-CM

## 2019-02-27 DIAGNOSIS — F32.0 MILD MAJOR DEPRESSION (H): ICD-10-CM

## 2019-02-27 PROCEDURE — 99214 OFFICE O/P EST MOD 30 MIN: CPT | Performed by: PHYSICIAN ASSISTANT

## 2019-02-27 ASSESSMENT — PAIN SCALES - GENERAL: PAINLEVEL: MODERATE PAIN (5)

## 2019-02-27 ASSESSMENT — MIFFLIN-ST. JEOR: SCORE: 1302.31

## 2019-02-27 NOTE — PATIENT INSTRUCTIONS
You have higher symptom scores this week than last.  You had significantly more sx after being in class for 2 days. Physical sx the brain is having too much stimulus.     Referral to concussion clinic- [facility] due to the vision symptoms. There are specific vision therapies if indicated    You cannot return to work  You should not be in class    You need 24 hr headache free before adding in some light brain stimulus    Be bored! Limit screen time. No reading text books.
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
## 2019-02-27 NOTE — LETTER
[facility]  [address], [address]  [place] [address]  Phone: [phone]  Fax: [phone]    February 27, 2019        Soo Timmons  [address] [facility] [address]          To whom it may concern:    RE: Soo Timmons    Patient was seen and treated today at our clinic and missed work. Soo is not cleared to return to work at this time. She is to remain out of work until further notice.     Please contact me for questions or concerns.      Sincerely,        Trinidad Phoenix PA-C

## 2019-02-28 ENCOUNTER — THERAPY VISIT (OUTPATIENT)
Dept: PHYSICAL THERAPY | Facility: CLINIC | Age: 39
End: 2019-02-28
Payer: COMMERCIAL

## 2019-02-28 DIAGNOSIS — M54.2 NECK PAIN: ICD-10-CM

## 2019-02-28 DIAGNOSIS — M54.6 ACUTE THORACIC BACK PAIN, UNSPECIFIED BACK PAIN LATERALITY: ICD-10-CM

## 2019-02-28 PROCEDURE — 97140 MANUAL THERAPY 1/> REGIONS: CPT | Mod: GP | Performed by: PHYSICAL THERAPIST

## 2019-02-28 PROCEDURE — 97110 THERAPEUTIC EXERCISES: CPT | Mod: GP | Performed by: PHYSICAL THERAPIST

## 2019-03-05 ENCOUNTER — THERAPY VISIT (OUTPATIENT)
Dept: PHYSICAL THERAPY | Facility: CLINIC | Age: 39
End: 2019-03-05
Payer: COMMERCIAL

## 2019-03-05 DIAGNOSIS — M54.2 NECK PAIN: ICD-10-CM

## 2019-03-05 DIAGNOSIS — M54.6 ACUTE THORACIC BACK PAIN, UNSPECIFIED BACK PAIN LATERALITY: ICD-10-CM

## 2019-03-05 PROCEDURE — 97140 MANUAL THERAPY 1/> REGIONS: CPT | Mod: GP | Performed by: PHYSICAL THERAPIST

## 2019-03-05 PROCEDURE — 97112 NEUROMUSCULAR REEDUCATION: CPT | Mod: GP | Performed by: PHYSICAL THERAPIST
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
## 2019-03-07 ENCOUNTER — THERAPY VISIT (OUTPATIENT)
Dept: PHYSICAL THERAPY | Facility: CLINIC | Age: 39
End: 2019-03-07
Payer: COMMERCIAL

## 2019-03-07 DIAGNOSIS — M54.2 NECK PAIN: ICD-10-CM

## 2019-03-07 DIAGNOSIS — M54.6 ACUTE THORACIC BACK PAIN, UNSPECIFIED BACK PAIN LATERALITY: ICD-10-CM

## 2019-03-07 PROCEDURE — 97140 MANUAL THERAPY 1/> REGIONS: CPT | Mod: GP | Performed by: PHYSICAL THERAPIST

## 2019-03-07 PROCEDURE — 97110 THERAPEUTIC EXERCISES: CPT | Mod: GP | Performed by: PHYSICAL THERAPIST
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
## 2019-03-08 ENCOUNTER — OFFICE VISIT (OUTPATIENT)
Dept: FAMILY MEDICINE | Facility: CLINIC | Age: 39
End: 2019-03-08
Payer: COMMERCIAL

## 2019-03-08 VITALS
TEMPERATURE: 97.8 F | WEIGHT: 134.4 LBS | SYSTOLIC BLOOD PRESSURE: 92 MMHG | DIASTOLIC BLOOD PRESSURE: 62 MMHG | HEIGHT: 67 IN | BODY MASS INDEX: 21.09 KG/M2 | OXYGEN SATURATION: 100 % | HEART RATE: 66 BPM

## 2019-03-08 DIAGNOSIS — S06.0X0D CONCUSSION WITHOUT LOSS OF CONSCIOUSNESS, SUBSEQUENT ENCOUNTER: Primary | ICD-10-CM

## 2019-03-08 PROCEDURE — 99214 OFFICE O/P EST MOD 30 MIN: CPT | Performed by: PHYSICIAN ASSISTANT

## 2019-03-08 ASSESSMENT — MIFFLIN-ST. JEOR: SCORE: 1327.26

## 2019-03-08 ASSESSMENT — PAIN SCALES - GENERAL: PAINLEVEL: MODERATE PAIN (4)
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
## 2019-03-08 NOTE — PATIENT INSTRUCTIONS
Continue the same cognitive rest as this week  Some improvement    I am happy with the muscular improvement in your back and neck!  Continue with PT for that    You will be called for vestibular physical therapy for balance, and visual symptoms:  If you have not heard from the scheduling office within 2 business days, please call [phone] for all locations, with the exception of [place], please call [phone] and [place], please call [phone].    Keep scheduled visit for March 21

## 2019-03-11 ENCOUNTER — THERAPY VISIT (OUTPATIENT)
Dept: PHYSICAL THERAPY | Facility: CLINIC | Age: 39
End: 2019-03-11
Payer: COMMERCIAL

## 2019-03-11 DIAGNOSIS — M54.2 NECK PAIN: ICD-10-CM

## 2019-03-11 DIAGNOSIS — M54.6 ACUTE THORACIC BACK PAIN, UNSPECIFIED BACK PAIN LATERALITY: ICD-10-CM

## 2019-03-11 PROCEDURE — 97110 THERAPEUTIC EXERCISES: CPT | Mod: GP | Performed by: PHYSICAL THERAPIST

## 2019-03-11 PROCEDURE — 97140 MANUAL THERAPY 1/> REGIONS: CPT | Mod: GP | Performed by: PHYSICAL THERAPIST

## 2019-03-15 ENCOUNTER — THERAPY VISIT (OUTPATIENT)
Dept: PHYSICAL THERAPY | Facility: CLINIC | Age: 39
End: 2019-03-15
Payer: COMMERCIAL

## 2019-03-15 DIAGNOSIS — M54.2 NECK PAIN: ICD-10-CM

## 2019-03-15 DIAGNOSIS — M54.6 ACUTE THORACIC BACK PAIN, UNSPECIFIED BACK PAIN LATERALITY: ICD-10-CM

## 2019-03-15 PROCEDURE — 97140 MANUAL THERAPY 1/> REGIONS: CPT | Mod: GP | Performed by: PHYSICAL THERAPIST

## 2019-03-15 PROCEDURE — 97110 THERAPEUTIC EXERCISES: CPT | Mod: GP | Performed by: PHYSICAL THERAPIST

## 2019-03-18 NOTE — PROGRESS NOTES
"  SUBJECTIVE:   Soo Timmons is a 38 year old female who presents to clinic today for the following health issues:  {Provider please address medication reconciliation discrepancies--rooming staff please delete if no med/rec issues}    HPI  {additional problems for roomer to add, delete if none:204722}  Problem list and histories reviewed & adjusted, as indicated.  Additional history: {NONE - AS DOCUMENTED:663287::\"as documented\"}    {ACUTE Problem SUPERLIST - brief histories:145457}    {HIST REVIEW/ LINKS 2:930357}    {PROVIDER CHARTING PREFERENCE:536197}  "

## 2019-03-19 ENCOUNTER — OFFICE VISIT (OUTPATIENT)
Dept: FAMILY MEDICINE | Facility: CLINIC | Age: 39
End: 2019-03-19
Payer: COMMERCIAL

## 2019-03-19 ENCOUNTER — THERAPY VISIT (OUTPATIENT)
Dept: PHYSICAL THERAPY | Facility: CLINIC | Age: 39
End: 2019-03-19
Payer: COMMERCIAL

## 2019-03-19 VITALS
RESPIRATION RATE: 16 BRPM | OXYGEN SATURATION: 99 % | BODY MASS INDEX: 21.68 KG/M2 | SYSTOLIC BLOOD PRESSURE: 88 MMHG | HEART RATE: 70 BPM | WEIGHT: 134.9 LBS | DIASTOLIC BLOOD PRESSURE: 66 MMHG | TEMPERATURE: 98.8 F | HEIGHT: 66 IN

## 2019-03-19 DIAGNOSIS — M54.2 NECK PAIN: ICD-10-CM

## 2019-03-19 DIAGNOSIS — K50.018 CROHN'S DISEASE OF ILEUM WITH OTHER COMPLICATION (H): ICD-10-CM

## 2019-03-19 DIAGNOSIS — M54.6 ACUTE THORACIC BACK PAIN, UNSPECIFIED BACK PAIN LATERALITY: ICD-10-CM

## 2019-03-19 DIAGNOSIS — S06.0X0D CONCUSSION WITHOUT LOSS OF CONSCIOUSNESS, SUBSEQUENT ENCOUNTER: Primary | ICD-10-CM

## 2019-03-19 PROCEDURE — 97140 MANUAL THERAPY 1/> REGIONS: CPT | Mod: GP | Performed by: PHYSICAL THERAPIST

## 2019-03-19 PROCEDURE — 99214 OFFICE O/P EST MOD 30 MIN: CPT | Performed by: NURSE PRACTITIONER

## 2019-03-19 PROCEDURE — 97110 THERAPEUTIC EXERCISES: CPT | Mod: GP | Performed by: PHYSICAL THERAPIST

## 2019-03-19 RX ORDER — PANTOPRAZOLE SODIUM 40 MG/1
40 TABLET, DELAYED RELEASE ORAL DAILY
Qty: 90 TABLET | Refills: 3 | Status: SHIPPED | OUTPATIENT
Start: 2019-03-19

## 2019-03-19 RX ORDER — PANTOPRAZOLE SODIUM 40 MG/1
40 TABLET, DELAYED RELEASE ORAL DAILY
Qty: 90 TABLET | Refills: 0 | Status: CANCELLED | OUTPATIENT
Start: 2019-03-19

## 2019-03-19 ASSESSMENT — PAIN SCALES - GENERAL: PAINLEVEL: MILD PAIN (2)

## 2019-03-19 ASSESSMENT — MIFFLIN-ST. JEOR: SCORE: 1313.65

## 2019-03-19 NOTE — PROGRESS NOTES
"  Soo Timmons is a 38 year old female who presents in follow up for a Data Unavailable that occurred on *** or *** {time:5003} ago.  Since last visit on 3/8/2019 patient notes ***.    Since your last visit, level of activity is:  {CONCUSSION - RTP STAGES:130120}    Since your last visit, have you continued with your normal cognitive activity (text, computer, school):  ***      Current Symptoms:  CONCUSSION SYMPTOMS ASSESSMENT 2/21/2019 2/27/2019 3/8/2019   Headache or Pressure In Head 3 - moderate 3 - moderate 3 - moderate   Upset Stomach or Throwing Up 0 - none 2 - mild to moderate 1 - mild   Problems with Balance 3 - moderate 5 - severe 3 - moderate   Feeling Dizzy 3 - moderate 4 - moderate to severe 3 - moderate   Sensitivity to Light 2 - mild to moderate 3 - moderate 1 - mild   Sensitivity to Noise 2 - mild to moderate 3 - moderate 3 - moderate   Mood Changes 3 - moderate 2 - mild to moderate 1 - mild   Feeling sluggish, hazy, or foggy 1 - mild 1 - mild 4 - moderate to severe   Trouble Concentrating, Lack of Focus 4 - moderate to severe 3 - moderate 3 - moderate   Motion Sickness 2 - mild to moderate 0 - none 1 - mild   Vision Changes 3 - moderate 2 - mild to moderate 0 - none   Memory Problems 2 - mild to moderate 3 - moderate 3 - moderate   Feeling Confused 3 - moderate 2 - mild to moderate 2 - mild to moderate   Neck Pain 4 - moderate to severe 4 - moderate to severe 5 - severe   Trouble Sleeping 5 - severe 4 - moderate to severe 3 - moderate   Total Number of Symptoms 14 14 14   Symptom Severity Score 40 41 36       Sleep: {Concussion Sleep Issues:757094::\"No Issues\"}    Patient's past medical, surgical, social and family histories are reviewed today.    Past Medical History:   Diagnosis Date     Depressive disorder 01/01/1997     [facility]     Fatigue 7/1/2010     Fatigue 7/1/2010     Fatigue      Inflammatory bowel disease (Crohn's disease) " "2014     Inflammatory bowel disease (Crohn's disease) (H) 2014     Inflammatory bowel disease (Crohn's disease) (H)      Unspecified contraceptive management      Variants of migraine, not elsewhere classified, without mention of intractable migraine without mention of status migrainosus      Past Surgical History:   Procedure Laterality Date     COLONOSCOPY  10/04/06    [facility]      COLONOSCOPY  2013    Procedure: COMBINED COLONOSCOPY, SINGLE BIOPSY/POLYPECTOMY BY BIOPSY;;  Surgeon: Presley Ocampo MD;  Location: [facility] OR     HC REMOVE INTRAUTERINE DEVICE  08     HC UGI ENDOSCOPY, SIMPLE EXAM  10/4/2006     LAPAROSCOPIC CHOLECYSTECTOMY  2011    Procedure:LAPAROSCOPIC CHOLECYSTECTOMY; Surgeon:LIYAH AVELAR; Location:[facility]       OBJECTIVE:  LMP  (LMP Unknown)     General: Healthy, well-appearing, and in no acute distress.  Skin: no suspicious lesions or rashes  Psych: mentation appears normal, and affect is appropriate/bright  HEENT: Neck is supple with full ROM; initial exam benign.  Neuromuscular/Strength: Full strength of all neck muscles; no motor weakness in C5-T1 distribution.    Neurologic/Visual:  Visual field testing: {Visual Field:131401::\"normal\"}  GIBRAN: {yes no:988031}  EOMI: {yes no:774265}  Nystagmus: {yes no:436709}  Painful eye movements: {yes no:319309}  Convergence testing: {Convergence Testin::\"Normal (6-8cm)\"}    Neurovestibular:  {Nueroves:276159}    Coordination:       - Finger to Nose: {NORMAL/ABNORMAL 1:052520::\"normal\"}       - Heel to Mendez: {NORMAL/ABNORMAL 1:526675::\"normal\"}       - Rapid Alternating Movements: {NORMAL/ABNORMAL 1:473494::\"normal\"}    Balance Testing:       - Romberg: {NORMAL/ABNORMAL 1:244107::\"normal\"}       - Backward Tandem: {NORMAL/ABNORMAL 1:153841::\"normal\"}       - Single-leg stance: {NORMAL/ABNORMAL 1:067607::\"normal\"}    {Neuro/Vestibular:649391}    {Gait:944078}    Cognitive:  Previous cognitive assessment was " "normal and without deficit; repeat cognitive testing not performed today.***    Immediate object recall: {12/12:644335::\"4/4\"}  4 Object Recall at 5 minutes:{12/12:005394::\"4/4\"}  Reverse months of the year: {12/12:534905::\"12/12\"}  Spell world backwards: {ABLE:054045::\"Able\"}  Backwards number string: {NUMBER:978418}   4-9-3                  Alternate:  6-2-9   3-8-1-4   3-2-7-9    6-2-9-7-1   1-5-2-8-6    7-1-8-4-6-2   5-3-9-1-4-8       Impact Testing Scores: {IMPACT Scores:194628}    ASSESSMENT:  Data Unavailable    PLAN:  {CONCUSSION - PLAN - WORK GROUP:148947}    Return to Play Progression given to athlete/parent to be monitored by {RETURN TO PLAY SUPERVISION:720742}.     Time spent in one-on-one evaluation and discussion with patient regarding nature of problem, course, prior treatments, and therapeutic options, at least 50% of which was spent in counseling and coordination of care:  *** minutes.    "

## 2019-03-19 NOTE — LETTER
March 19, 2019      Soo Timmons  [id number] [facility] [address]        To Whom It May Concern,          Soo has not yet been cleared to return to work or school, due to her concussion symptoms. She is cleared to do 30 minutes of homework- twice/day. She is seeing a specialist on 3/21/19.      Sincerely,        FRANCESCA Jones CNP
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
## 2019-03-20 ASSESSMENT — PATIENT HEALTH QUESTIONNAIRE - PHQ9: SUM OF ALL RESPONSES TO PHQ QUESTIONS 1-9: 15

## 2019-03-21 ENCOUNTER — OFFICE VISIT (OUTPATIENT)
Dept: SURGERY | Facility: CLINIC | Age: 39
End: 2019-03-21
Payer: COMMERCIAL

## 2019-03-21 VITALS
WEIGHT: 133.7 LBS | DIASTOLIC BLOOD PRESSURE: 73 MMHG | HEART RATE: 73 BPM | HEIGHT: 66 IN | BODY MASS INDEX: 21.49 KG/M2 | OXYGEN SATURATION: 99 % | SYSTOLIC BLOOD PRESSURE: 113 MMHG

## 2019-03-21 DIAGNOSIS — S06.0X0A CONCUSSION WITHOUT LOSS OF CONSCIOUSNESS, INITIAL ENCOUNTER: Primary | ICD-10-CM

## 2019-03-21 RX ORDER — NORTRIPTYLINE HCL 10 MG
CAPSULE ORAL
Qty: 60 CAPSULE | Refills: 1 | Status: SHIPPED | OUTPATIENT
Start: 2019-03-21 | End: 2019-04-08 | Stop reason: ALTCHOICE

## 2019-03-21 RX ORDER — LANOLIN ALCOHOL/MO/W.PET/CERES
CREAM (GRAM) TOPICAL
COMMUNITY
Start: 2019-03-21 | End: 2019-12-23

## 2019-03-21 ASSESSMENT — ANXIETY QUESTIONNAIRES
7. FEELING AFRAID AS IF SOMETHING AWFUL MIGHT HAPPEN: NOT AT ALL
GAD7 TOTAL SCORE: 9
6. BECOMING EASILY ANNOYED OR IRRITABLE: SEVERAL DAYS
3. WORRYING TOO MUCH ABOUT DIFFERENT THINGS: SEVERAL DAYS
GAD7 TOTAL SCORE: 9
5. BEING SO RESTLESS THAT IT IS HARD TO SIT STILL: MORE THAN HALF THE DAYS
2. NOT BEING ABLE TO STOP OR CONTROL WORRYING: SEVERAL DAYS
1. FEELING NERVOUS, ANXIOUS, OR ON EDGE: MORE THAN HALF THE DAYS
4. TROUBLE RELAXING: MORE THAN HALF THE DAYS
GAD7 TOTAL SCORE: 9

## 2019-03-21 ASSESSMENT — MIFFLIN-ST. JEOR: SCORE: 1308.21

## 2019-03-21 ASSESSMENT — PATIENT HEALTH QUESTIONNAIRE - PHQ9
SUM OF ALL RESPONSES TO PHQ QUESTIONS 1-9: 14
SUM OF ALL RESPONSES TO PHQ QUESTIONS 1-9: 14
10. IF YOU CHECKED OFF ANY PROBLEMS, HOW DIFFICULT HAVE THESE PROBLEMS MADE IT FOR YOU TO DO YOUR WORK, TAKE CARE OF THINGS AT HOME, OR GET ALONG WITH OTHER PEOPLE: VERY DIFFICULT

## 2019-03-21 ASSESSMENT — PAIN SCALES - GENERAL: PAINLEVEL: SEVERE PAIN (7)
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
## 2019-03-21 NOTE — PATIENT INSTRUCTIONS
"GENERAL ADVICE  ~ Gradually ease back into your usual activities.  ~ Rest as needed to help your symptoms go away.   - Consider pairing 50 minutes of activity with 10 minutes of rest.  ~ Allow yourself more time for activities.  ~ Write things down.  At home, at work, whenever there is something that you should remember, even it is simple.    SCREENS  ~ Change settings on your phone and computer using the \"Blue Light Filter\" (Night Shift on all [employer] products)   ~ The goal is making screens more yellow and less blue.    ~ Turn screen brightness down  ~ Increase font size    NECK PAIN  ~ Ice or Heat are good~  ~ Massage is ok if it doesn't trigger more symptoms~  ~ Gentle stretches and range-of-motion are helpful    FATIGUE  ~ Daily exercise is strongly encouraged.  Start with a 10 min walk and increase the time as tolerated until you are walking 30 minutes per day.      ANXIETY OR MOOD SWINGS:  ~ If you are irritable or anxious, take a break in a quiet room.  ~ Try using the free Calm ramón for guided breathing and mindfulness/meditation.  ~ Explore Tuneenergy (https://www.headspace.com) for free and easy-to-use meditation guidance.    Diet:  - In principle incorporate more protein, lots of veggies, some fruit, whole grains.    - Less sweets and saturated fat.   - Mediterranean Diet is an easy-to-follow example.  ~ Drink plenty of water throughout the day (8-10 glasses per day)      Strongly encourage dropping the ethics class  [facility] will call regarding your physical therapy and MAY request that you change providers.    Massage will help.  Consider going to:   [facility] ([place])    SLEEP  ~ You need to sleep.   ~ If your headaches are keeping you awake take your tylenol right before bedtime.  ~ Attempt for 8 hours of sleep a night. If you are having issues sleeping at night, avoid napping during the day.  ~ Melatonin starting at 3mg tab  minutes before bed may be helpful. This is a substance your body makes " "naturally that signals it is time for sleep.    \"Sleep hygiene\" means having good sleep habits. Follow the tips below to sleep better at night.      Get on a schedule. Go to bed and get up at about the same time every day.    Listen to your body. Only try to sleep when you actually feel tired or sleepy.    Be patient. If you haven't been able to get to sleep after about 20 minutes or more, get up and do something calming or boring until you feel sleepy. Then, return to bed and try again.      Avoid caffeine (coffee, tea, cola drinks, chocolate and some medicines) for at least 4 to 6 hours before going to bed. We also suggest you don't use alcohol or nicotine (cigarettes) during this time. Both can make it harder for you to fall asleep and stay asleep.    Stop drinking water 2-3 hours before bed to avoid awakening to use the bathroom    Use your bed for sleeping only. That means no TV, computer or homework in bed!    Avoid screens in general 1-2 hours before bedtime.    Don't nap during the day. If you do nap, make sure it is for less than an hour and before 3 p.m.    Create sleep rituals that remind your body that it is time to sleep. Examples include breathing exercises, stretching, or reading a book.     Try a bath or shower before bed. Having a hot bath 1 to 2 hours before bedtime can help you feel sleepy.    Don't watch the clock. Checking the clock during the night can wake you up. It can also lead to negative thoughts such as \"I will never fall asleep.\"    Use a sleep diary. Track your sleep schedule to know your sleep patterns and to see where you can improve.    Get regular exercise. But try not to do heavy exercise in the 4 hours before bedtime.      Eat a healthy, balanced diet. Try eating a light, healthy snack before bed, but avoid eating a heavy meal.    Create the right sleeping area. A cool, dark, quiet room is best. If needed, try earplugs, fans and blackout curtains.      Keep your daytime routine " the same even if you have a bad night sleep. Avoiding activities the next day can make it harder to sleep.        For informational purposes only. Not to replace the advice of your health care provider. Copyright   2013 [employer]. All rights reserved.

## 2019-03-22 ENCOUNTER — AMBULATORY - HEALTHEAST (OUTPATIENT)
Dept: NEUROLOGY | Facility: CLINIC | Age: 39
End: 2019-03-22

## 2019-03-22 ENCOUNTER — TRANSFERRED RECORDS (OUTPATIENT)
Dept: HEALTH INFORMATION MANAGEMENT | Facility: CLINIC | Age: 39
End: 2019-03-22

## 2019-03-22 DIAGNOSIS — S06.0XAA CONCUSSION: ICD-10-CM

## 2019-03-22 ASSESSMENT — ANXIETY QUESTIONNAIRES: GAD7 TOTAL SCORE: 9

## 2019-03-26 ENCOUNTER — TELEPHONE (OUTPATIENT)
Dept: SURGERY | Facility: CLINIC | Age: 39
End: 2019-03-26

## 2019-03-27 ENCOUNTER — HOSPITAL ENCOUNTER (OUTPATIENT)
Dept: NEUROLOGY | Facility: CLINIC | Age: 39
Setting detail: THERAPIES SERIES
Discharge: STILL ADMITTED OR ADMINISTRATIVE | End: 2019-03-27
Attending: CLINICAL NEUROPSYCHOLOGIST

## 2019-03-27 ENCOUNTER — TRANSFERRED RECORDS (OUTPATIENT)
Dept: HEALTH INFORMATION MANAGEMENT | Facility: CLINIC | Age: 39
End: 2019-03-27

## 2019-03-27 DIAGNOSIS — F43.23 ADJUSTMENT DISORDER WITH MIXED ANXIETY AND DEPRESSED MOOD: ICD-10-CM

## 2019-03-27 DIAGNOSIS — S06.0XAA CONCUSSION: ICD-10-CM

## 2019-03-29 ENCOUNTER — THERAPY VISIT (OUTPATIENT)
Dept: PHYSICAL THERAPY | Facility: CLINIC | Age: 39
End: 2019-03-29
Payer: COMMERCIAL

## 2019-03-29 DIAGNOSIS — M54.6 ACUTE THORACIC BACK PAIN, UNSPECIFIED BACK PAIN LATERALITY: ICD-10-CM

## 2019-03-29 DIAGNOSIS — M54.2 NECK PAIN: ICD-10-CM

## 2019-03-29 PROCEDURE — 97110 THERAPEUTIC EXERCISES: CPT | Mod: GP | Performed by: PHYSICAL THERAPIST

## 2019-03-29 PROCEDURE — 97140 MANUAL THERAPY 1/> REGIONS: CPT | Mod: GP | Performed by: PHYSICAL THERAPIST

## 2019-04-05 ENCOUNTER — INFUSION THERAPY VISIT (OUTPATIENT)
Dept: INFUSION THERAPY | Facility: CLINIC | Age: 39
End: 2019-04-05
Attending: PHYSICIAN ASSISTANT
Payer: COMMERCIAL

## 2019-04-05 ENCOUNTER — THERAPY VISIT (OUTPATIENT)
Dept: PHYSICAL THERAPY | Facility: CLINIC | Age: 39
End: 2019-04-05
Payer: COMMERCIAL

## 2019-04-05 VITALS
WEIGHT: 136 LBS | BODY MASS INDEX: 21.95 KG/M2 | HEART RATE: 64 BPM | RESPIRATION RATE: 18 BRPM | DIASTOLIC BLOOD PRESSURE: 65 MMHG | SYSTOLIC BLOOD PRESSURE: 109 MMHG | TEMPERATURE: 98.4 F | OXYGEN SATURATION: 99 %

## 2019-04-05 DIAGNOSIS — M54.2 NECK PAIN: ICD-10-CM

## 2019-04-05 DIAGNOSIS — M54.6 ACUTE THORACIC BACK PAIN, UNSPECIFIED BACK PAIN LATERALITY: ICD-10-CM

## 2019-04-05 DIAGNOSIS — K50.019 CROHN'S DISEASE OF SMALL INTESTINE WITH COMPLICATION (H): Primary | ICD-10-CM

## 2019-04-05 LAB
ALBUMIN SERPL-MCNC: 3.7 G/DL (ref 3.4–5)
ALP SERPL-CCNC: 60 U/L (ref 40–150)
ALT SERPL W P-5'-P-CCNC: 30 U/L (ref 0–50)
AST SERPL W P-5'-P-CCNC: 20 U/L (ref 0–45)
BASOPHILS # BLD AUTO: 0 10E9/L (ref 0–0.2)
BASOPHILS NFR BLD AUTO: 0.4 %
BILIRUB DIRECT SERPL-MCNC: 0.1 MG/DL (ref 0–0.2)
BILIRUB SERPL-MCNC: 0.6 MG/DL (ref 0.2–1.3)
CRP SERPL-MCNC: <2.9 MG/L (ref 0–8)
DIFFERENTIAL METHOD BLD: NORMAL
EOSINOPHIL NFR BLD AUTO: 5.2 %
ERYTHROCYTE [DISTWIDTH] IN BLOOD BY AUTOMATED COUNT: 13.6 % (ref 10–15)
ERYTHROCYTE [SEDIMENTATION RATE] IN BLOOD BY WESTERGREN METHOD: 5 MM/H (ref 0–20)
HCT VFR BLD AUTO: 40.2 % (ref 35–47)
HGB BLD-MCNC: 13 G/DL (ref 11.7–15.7)
IMM GRANULOCYTES # BLD: 0 10E9/L (ref 0–0.4)
IMM GRANULOCYTES NFR BLD: 0.2 %
LYMPHOCYTES # BLD AUTO: 1.5 10E9/L (ref 0.8–5.3)
LYMPHOCYTES NFR BLD AUTO: 28.8 %
MCH RBC QN AUTO: 29.8 PG (ref 26.5–33)
MCHC RBC AUTO-ENTMCNC: 32.3 G/DL (ref 31.5–36.5)
MCV RBC AUTO: 92 FL (ref 78–100)
MONOCYTES # BLD AUTO: 0.4 10E9/L (ref 0–1.3)
MONOCYTES NFR BLD AUTO: 7.1 %
NEUTROPHILS # BLD AUTO: 3.1 10E9/L (ref 1.6–8.3)
NEUTROPHILS NFR BLD AUTO: 58.3 %
NRBC # BLD AUTO: 0 10*3/UL
NRBC BLD AUTO-RTO: 0 /100
PLATELET # BLD AUTO: 229 10E9/L (ref 150–450)
PROT SERPL-MCNC: 6.5 G/DL (ref 6.8–8.8)
RBC # BLD AUTO: 4.36 10E12/L (ref 3.8–5.2)
WBC # BLD AUTO: 5.2 10E9/L (ref 4–11)

## 2019-04-05 PROCEDURE — 96365 THER/PROPH/DIAG IV INF INIT: CPT

## 2019-04-05 PROCEDURE — 97110 THERAPEUTIC EXERCISES: CPT | Mod: GP | Performed by: PHYSICAL THERAPIST

## 2019-04-05 PROCEDURE — 80076 HEPATIC FUNCTION PANEL: CPT | Performed by: PHYSICIAN ASSISTANT

## 2019-04-05 PROCEDURE — 86140 C-REACTIVE PROTEIN: CPT | Performed by: PHYSICIAN ASSISTANT

## 2019-04-05 PROCEDURE — 85652 RBC SED RATE AUTOMATED: CPT | Performed by: PHYSICIAN ASSISTANT

## 2019-04-05 PROCEDURE — 96413 CHEMO IV INFUSION 1 HR: CPT

## 2019-04-05 PROCEDURE — 25000128 H RX IP 250 OP 636: Performed by: FAMILY MEDICINE

## 2019-04-05 PROCEDURE — 97140 MANUAL THERAPY 1/> REGIONS: CPT | Mod: GP | Performed by: PHYSICAL THERAPIST

## 2019-04-05 PROCEDURE — 36415 COLL VENOUS BLD VENIPUNCTURE: CPT | Performed by: PHYSICIAN ASSISTANT

## 2019-04-05 PROCEDURE — 85025 COMPLETE CBC W/AUTO DIFF WBC: CPT | Performed by: PHYSICIAN ASSISTANT

## 2019-04-05 PROCEDURE — 25800030 ZZH RX IP 258 OP 636: Performed by: FAMILY MEDICINE

## 2019-04-05 RX ADMIN — VEDOLIZUMAB 300 MG: 300 INJECTION, POWDER, LYOPHILIZED, FOR SOLUTION INTRAVENOUS at 10:10

## 2019-04-05 NOTE — PROGRESS NOTES
"Infusion Nursing Note:  Soo SARAN Timmons presents today for Entyvio  .    Patient seen by provider today: No   present during visit today: Not Applicable.    Note: N/A.    Intravenous Access:  Peripheral IV placed.  ILabs drawn peripherally by lab staff prior to treatment.    Treatment Conditions:PRIOR TO INFUSION OF BIOLOGICAL MEDICATIONS OR ANY OF THESE AS LISTED: Benlysta (benlimumab) \".rheumbiologicalchecklist\"    Prior to Infusion of biological medications or any of these as listed:    1. Elevated temperature, fever, chills, productive cough or abnormal vital signs, night sweats, coughing up blood or sputum, no appetite or abnormal vital signs : NO    2. Open wounds or new incisions: NO    3. Recent hospitalization: NO    4.  Recent surgeries:  NO    5. Any upcoming surgeries or dental procedures?:NO    6. Any current or recent bouts of illness or infection? On any antibiotics? : NO    7. Any new, sudden or worsening abdominal pain :NO    8. Vaccination within 4 weeks? Patient or someone in the household is scheduled to receive vaccination? No live virus vaccines prior to or during treatment :NO    9. Any nervous system diseases [i.e. multiple sclerosis, Guillain-Bismar[place], seizures, neurological  changes]: NO    10. Pregnant or breast feeding; or plans on pregnancy in the future: NO    11. Signs of worsening depression or suicidal ideations while taking benlysta:NO    12. New-onset medical symptoms: NO    13.  New cancer diagnosis or on chemotherapy or radiation NO    14.  Evaluate for any sign of active TB [Unexplained weight loss, Loss of appetite, Night sweats, Fever, Fatigue, Chills, Coughing for 3 weeks or longer, Hemoptysis (coughing up blood), Chest pain]: NO    **Note: If answered yes to any of the above, hold the infusion and contact ordering rheumatologist or on-call rheumatologist.   .      Post Infusion Assessment:  Patient tolerated infusion without incident.       Discharge Plan: "   Discharge instructions reviewed with: Patient.  Patient and/or family verbalized understanding of discharge instructions and all questions answered.  Patient discharged in stable condition accompanied by: self.  Departure Mode: Ambulatory.    Kathy Boateng RN
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
## 2019-04-08 ENCOUNTER — OFFICE VISIT (OUTPATIENT)
Dept: FAMILY MEDICINE | Facility: CLINIC | Age: 39
End: 2019-04-08
Payer: COMMERCIAL

## 2019-04-08 VITALS
RESPIRATION RATE: 16 BRPM | BODY MASS INDEX: 21.79 KG/M2 | DIASTOLIC BLOOD PRESSURE: 82 MMHG | WEIGHT: 135.6 LBS | HEART RATE: 74 BPM | TEMPERATURE: 99 F | OXYGEN SATURATION: 99 % | SYSTOLIC BLOOD PRESSURE: 104 MMHG | HEIGHT: 66 IN

## 2019-04-08 VITALS
SYSTOLIC BLOOD PRESSURE: 104 MMHG | WEIGHT: 135.6 LBS | DIASTOLIC BLOOD PRESSURE: 82 MMHG | HEART RATE: 74 BPM | HEIGHT: 66 IN | RESPIRATION RATE: 16 BRPM | TEMPERATURE: 99 F | OXYGEN SATURATION: 99 % | BODY MASS INDEX: 21.79 KG/M2

## 2019-04-08 DIAGNOSIS — F32.0 MILD MAJOR DEPRESSION (H): ICD-10-CM

## 2019-04-08 DIAGNOSIS — K50.018 CROHN'S DISEASE OF ILEUM WITH OTHER COMPLICATION (H): ICD-10-CM

## 2019-04-08 DIAGNOSIS — F41.1 GAD (GENERALIZED ANXIETY DISORDER): ICD-10-CM

## 2019-04-08 PROCEDURE — 99213 OFFICE O/P EST LOW 20 MIN: CPT | Performed by: NURSE PRACTITIONER

## 2019-04-08 PROCEDURE — 99214 OFFICE O/P EST MOD 30 MIN: CPT | Performed by: NURSE PRACTITIONER

## 2019-04-08 RX ORDER — FLUOXETINE 20 MG/1
60 TABLET, FILM COATED ORAL DAILY
Qty: 270 TABLET | Refills: 1 | Status: SHIPPED | OUTPATIENT
Start: 2019-04-08 | End: 2019-12-23

## 2019-04-08 RX ORDER — FLUOXETINE 20 MG/1
40 TABLET, FILM COATED ORAL DAILY
Qty: 180 TABLET | Refills: 1 | Status: CANCELLED | OUTPATIENT
Start: 2019-04-08

## 2019-04-08 RX ORDER — HYDROXYZINE HYDROCHLORIDE 25 MG/1
12.5-25 TABLET, FILM COATED ORAL EVERY 6 HOURS PRN
Qty: 30 TABLET | Refills: 0 | Status: SHIPPED | OUTPATIENT
Start: 2019-04-08 | End: 2019-05-02

## 2019-04-08 RX ORDER — HYDROXYZINE HYDROCHLORIDE 25 MG/1
12.5-25 TABLET, FILM COATED ORAL EVERY 6 HOURS PRN
Qty: 20 TABLET | Refills: 1 | Status: CANCELLED | OUTPATIENT
Start: 2019-04-08

## 2019-04-08 ASSESSMENT — MIFFLIN-ST. JEOR
SCORE: 1316.83
SCORE: 1316.83

## 2019-04-08 ASSESSMENT — ANXIETY QUESTIONNAIRES
2. NOT BEING ABLE TO STOP OR CONTROL WORRYING: NEARLY EVERY DAY
6. BECOMING EASILY ANNOYED OR IRRITABLE: MORE THAN HALF THE DAYS
1. FEELING NERVOUS, ANXIOUS, OR ON EDGE: MORE THAN HALF THE DAYS
GAD7 TOTAL SCORE: 14
5. BEING SO RESTLESS THAT IT IS HARD TO SIT STILL: MORE THAN HALF THE DAYS
GAD7 TOTAL SCORE: 14
7. FEELING AFRAID AS IF SOMETHING AWFUL MIGHT HAPPEN: SEVERAL DAYS
7. FEELING AFRAID AS IF SOMETHING AWFUL MIGHT HAPPEN: SEVERAL DAYS
4. TROUBLE RELAXING: MORE THAN HALF THE DAYS
GAD7 TOTAL SCORE: 14
3. WORRYING TOO MUCH ABOUT DIFFERENT THINGS: MORE THAN HALF THE DAYS

## 2019-04-08 ASSESSMENT — PATIENT HEALTH QUESTIONNAIRE - PHQ9
SUM OF ALL RESPONSES TO PHQ QUESTIONS 1-9: 14
SUM OF ALL RESPONSES TO PHQ QUESTIONS 1-9: 14

## 2019-04-08 ASSESSMENT — PAIN SCALES - GENERAL
PAINLEVEL: MILD PAIN (3)
PAINLEVEL: MILD PAIN (3)
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
## 2019-04-09 ASSESSMENT — ANXIETY QUESTIONNAIRES: GAD7 TOTAL SCORE: 14

## 2019-04-09 ASSESSMENT — PATIENT HEALTH QUESTIONNAIRE - PHQ9: SUM OF ALL RESPONSES TO PHQ QUESTIONS 1-9: 14

## 2019-04-12 ENCOUNTER — THERAPY VISIT (OUTPATIENT)
Dept: PHYSICAL THERAPY | Facility: CLINIC | Age: 39
End: 2019-04-12
Payer: COMMERCIAL

## 2019-04-12 DIAGNOSIS — M54.2 NECK PAIN: ICD-10-CM

## 2019-04-12 DIAGNOSIS — M54.6 ACUTE THORACIC BACK PAIN, UNSPECIFIED BACK PAIN LATERALITY: ICD-10-CM

## 2019-04-12 PROCEDURE — 97140 MANUAL THERAPY 1/> REGIONS: CPT | Mod: GP | Performed by: PHYSICAL THERAPIST

## 2019-04-12 PROCEDURE — 97110 THERAPEUTIC EXERCISES: CPT | Mod: GP | Performed by: PHYSICAL THERAPIST

## 2019-04-12 PROCEDURE — 97530 THERAPEUTIC ACTIVITIES: CPT | Mod: GP | Performed by: PHYSICAL THERAPIST

## 2019-04-18 ENCOUNTER — THERAPY VISIT (OUTPATIENT)
Dept: PHYSICAL THERAPY | Facility: CLINIC | Age: 39
End: 2019-04-18
Payer: COMMERCIAL

## 2019-04-18 DIAGNOSIS — M54.2 NECK PAIN: ICD-10-CM

## 2019-04-18 DIAGNOSIS — M54.6 ACUTE THORACIC BACK PAIN, UNSPECIFIED BACK PAIN LATERALITY: ICD-10-CM

## 2019-04-18 PROCEDURE — 97112 NEUROMUSCULAR REEDUCATION: CPT | Mod: GP | Performed by: PHYSICAL THERAPIST

## 2019-04-18 PROCEDURE — 97110 THERAPEUTIC EXERCISES: CPT | Mod: GP | Performed by: PHYSICAL THERAPIST

## 2019-04-18 PROCEDURE — 97530 THERAPEUTIC ACTIVITIES: CPT | Mod: GP | Performed by: PHYSICAL THERAPIST
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
## 2019-04-18 NOTE — LETTER
Soo Timmons  [id number] [facility] [id number]      April 18, 2019        To whom it may concern:    Soo has passed all of her lifting requirements to return to work.  She can lift 50# from low counter to normal counter.  She can carry 40# for 100' without pain.  He is able to lift 40# from the floor. She is able to pull 50# on bed with ease.    If you have questions, please contact me.      Ramirez Bruce,PT, DPT, OCS  Doctor Physical Therapy  Board Certified Orthopedic Specialist  [phone]

## 2019-04-24 ENCOUNTER — HOSPITAL ENCOUNTER (OUTPATIENT)
Dept: OCCUPATIONAL THERAPY | Facility: CLINIC | Age: 39
Setting detail: THERAPIES SERIES
End: 2019-04-24
Attending: NURSE PRACTITIONER
Payer: COMMERCIAL

## 2019-04-24 PROCEDURE — 97166 OT EVAL MOD COMPLEX 45 MIN: CPT | Mod: GO

## 2019-04-24 PROCEDURE — 97535 SELF CARE MNGMENT TRAINING: CPT | Mod: GO

## 2019-04-25 ENCOUNTER — THERAPY VISIT (OUTPATIENT)
Dept: PHYSICAL THERAPY | Facility: CLINIC | Age: 39
End: 2019-04-25
Payer: COMMERCIAL

## 2019-04-25 DIAGNOSIS — M54.6 ACUTE THORACIC BACK PAIN, UNSPECIFIED BACK PAIN LATERALITY: ICD-10-CM

## 2019-04-25 DIAGNOSIS — M54.2 NECK PAIN: ICD-10-CM

## 2019-04-25 PROCEDURE — 97530 THERAPEUTIC ACTIVITIES: CPT | Mod: GP | Performed by: PHYSICAL THERAPIST

## 2019-04-25 PROCEDURE — 97140 MANUAL THERAPY 1/> REGIONS: CPT | Mod: GP | Performed by: PHYSICAL THERAPIST

## 2019-04-25 PROCEDURE — 97110 THERAPEUTIC EXERCISES: CPT | Mod: GP | Performed by: PHYSICAL THERAPIST

## 2019-04-29 ENCOUNTER — TELEPHONE (OUTPATIENT)
Dept: FAMILY MEDICINE | Facility: CLINIC | Age: 39
End: 2019-04-29

## 2019-04-29 NOTE — TELEPHONE ENCOUNTER
Call pt. [facility] No updated pap found in transferred records. Due for pap.   Please aid in scheduling PE. ( Looks like was planned for Feb. )    Rossi Ambrose

## 2019-04-30 NOTE — TELEPHONE ENCOUNTER
Patient called clinic back. I relayed message from below. She said she will call back later to get one scheduled.

## 2019-05-02 ENCOUNTER — OFFICE VISIT (OUTPATIENT)
Dept: SURGERY | Facility: CLINIC | Age: 39
End: 2019-05-02
Payer: COMMERCIAL

## 2019-05-02 ENCOUNTER — HOSPITAL ENCOUNTER (OUTPATIENT)
Dept: OCCUPATIONAL THERAPY | Facility: CLINIC | Age: 39
Setting detail: THERAPIES SERIES
End: 2019-05-02
Attending: NURSE PRACTITIONER
Payer: COMMERCIAL

## 2019-05-02 VITALS
HEIGHT: 66 IN | DIASTOLIC BLOOD PRESSURE: 74 MMHG | SYSTOLIC BLOOD PRESSURE: 115 MMHG | BODY MASS INDEX: 21.69 KG/M2 | WEIGHT: 135 LBS | OXYGEN SATURATION: 100 % | HEART RATE: 62 BPM

## 2019-05-02 DIAGNOSIS — S06.0X0D CONCUSSION WITHOUT LOSS OF CONSCIOUSNESS, SUBSEQUENT ENCOUNTER: Primary | ICD-10-CM

## 2019-05-02 PROCEDURE — 97530 THERAPEUTIC ACTIVITIES: CPT | Mod: GO

## 2019-05-02 ASSESSMENT — ANXIETY QUESTIONNAIRES
7. FEELING AFRAID AS IF SOMETHING AWFUL MIGHT HAPPEN: SEVERAL DAYS
4. TROUBLE RELAXING: MORE THAN HALF THE DAYS
GAD7 TOTAL SCORE: 13
7. FEELING AFRAID AS IF SOMETHING AWFUL MIGHT HAPPEN: SEVERAL DAYS
6. BECOMING EASILY ANNOYED OR IRRITABLE: MORE THAN HALF THE DAYS
GAD7 TOTAL SCORE: 13
3. WORRYING TOO MUCH ABOUT DIFFERENT THINGS: MORE THAN HALF THE DAYS
2. NOT BEING ABLE TO STOP OR CONTROL WORRYING: SEVERAL DAYS
5. BEING SO RESTLESS THAT IT IS HARD TO SIT STILL: MORE THAN HALF THE DAYS
1. FEELING NERVOUS, ANXIOUS, OR ON EDGE: NEARLY EVERY DAY
GAD7 TOTAL SCORE: 13

## 2019-05-02 ASSESSMENT — PATIENT HEALTH QUESTIONNAIRE - PHQ9
SUM OF ALL RESPONSES TO PHQ QUESTIONS 1-9: 12
10. IF YOU CHECKED OFF ANY PROBLEMS, HOW DIFFICULT HAVE THESE PROBLEMS MADE IT FOR YOU TO DO YOUR WORK, TAKE CARE OF THINGS AT HOME, OR GET ALONG WITH OTHER PEOPLE: VERY DIFFICULT
SUM OF ALL RESPONSES TO PHQ QUESTIONS 1-9: 12

## 2019-05-02 ASSESSMENT — PAIN SCALES - GENERAL: PAINLEVEL: MILD PAIN (3)

## 2019-05-02 ASSESSMENT — MIFFLIN-ST. JEOR: SCORE: 1314.11

## 2019-05-02 NOTE — PATIENT INSTRUCTIONS
No changes in medications today.  I will contact you after talking to Dr Muse with medication recommendations at that time.    Referrals for:   Eye doctor   Psychiatrist   Counselor   Chiropractor  Follow up with [facility] in 6 weeks.    Follow up with me in 6 weeks.        Work.   --------------------  Three days a week only-- 6 hours per day only    Driving  ------------------  Strongly suggest avoiding driving  IF you must, do 2 minutes of breathing meditation before you start  Avoid driving with other people in the car  No music, turn off your ringer and notifications on your phone  Avoid town traffic
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
## 2019-05-02 NOTE — Clinical Note
Thank you for helping while Farrukh is away.  Will you please review this chart and make recommendations for medication adjustments?  She has a thorough neuropsych exam done so hopefully that will assist.  I told her I would get back with recommendations and then to follow up with Farrukh in 6 weeks.  Let me know if that should stay with Farrukh or if you would like to follow her up.  Marcos
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
## 2019-05-02 NOTE — NURSING NOTE
"  Chief Complaint   Patient presents with     Head Injury     Return     Vitals:    05/02/19 1302   BP: 115/74   Pulse: 62   SpO2: 100%   Weight: 61.2 kg (135 lb)   Height: 1.676 m (5' 6\")     Body mass index is 21.79 kg/m .  Lui Villalpando CMA    "

## 2019-05-02 NOTE — Clinical Note
Dana thank you for helping with Soo.  Couple things... Zahira I am very concerned about her driving right now, but she almost has to given [place] out [place].  Please do what you can to help decrease her anxiety, give her confidence and all those other wandy things you do.  I also suggested she needs more lists, she is missing things then getting mad at her self for missing them...Ramirez, Can you work on her vision as well as her neck stuff?  Maybe you already are as I only scanned your note very briefly but I think that is adding to the anxiety while driving.  Also, if you could mention again the proper nutrition needed for healing and as she increases her exercise routine, she needs to keep that in mind.  Thanks again to both of you-- Floridalma
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
## 2019-05-03 ENCOUNTER — THERAPY VISIT (OUTPATIENT)
Dept: PHYSICAL THERAPY | Facility: CLINIC | Age: 39
End: 2019-05-03
Payer: COMMERCIAL

## 2019-05-03 DIAGNOSIS — M54.2 NECK PAIN: ICD-10-CM

## 2019-05-03 DIAGNOSIS — M54.6 ACUTE THORACIC BACK PAIN, UNSPECIFIED BACK PAIN LATERALITY: ICD-10-CM

## 2019-05-03 PROCEDURE — 97140 MANUAL THERAPY 1/> REGIONS: CPT | Mod: GP | Performed by: PHYSICAL THERAPIST

## 2019-05-03 PROCEDURE — 97112 NEUROMUSCULAR REEDUCATION: CPT | Mod: GP | Performed by: PHYSICAL THERAPIST

## 2019-05-03 ASSESSMENT — ANXIETY QUESTIONNAIRES: GAD7 TOTAL SCORE: 13
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
## 2019-05-04 NOTE — ADDENDUM NOTE
Encounter addended by: Zahira Smomers OT on: 5/4/2019 11:53 AM   Actions taken: Sign clinical note, Flowsheet accepted

## 2019-05-07 ENCOUNTER — HOSPITAL ENCOUNTER (OUTPATIENT)
Dept: OCCUPATIONAL THERAPY | Facility: CLINIC | Age: 39
Setting detail: THERAPIES SERIES
End: 2019-05-07
Attending: NURSE PRACTITIONER
Payer: COMMERCIAL

## 2019-05-07 PROCEDURE — 97112 NEUROMUSCULAR REEDUCATION: CPT | Mod: GO

## 2019-05-07 PROCEDURE — 97535 SELF CARE MNGMENT TRAINING: CPT | Mod: GO

## 2019-05-07 PROCEDURE — 97530 THERAPEUTIC ACTIVITIES: CPT | Mod: GO

## 2019-05-08 ENCOUNTER — DOCUMENTATION ONLY (OUTPATIENT)
Dept: BEHAVIORAL HEALTH | Facility: CLINIC | Age: 39
End: 2019-05-08
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
## 2019-05-09 ENCOUNTER — THERAPY VISIT (OUTPATIENT)
Dept: PHYSICAL THERAPY | Facility: CLINIC | Age: 39
End: 2019-05-09
Payer: COMMERCIAL

## 2019-05-09 DIAGNOSIS — M54.2 NECK PAIN: ICD-10-CM

## 2019-05-09 DIAGNOSIS — M54.6 ACUTE THORACIC BACK PAIN, UNSPECIFIED BACK PAIN LATERALITY: ICD-10-CM

## 2019-05-09 PROCEDURE — 97112 NEUROMUSCULAR REEDUCATION: CPT | Mod: GP | Performed by: PHYSICAL THERAPIST

## 2019-05-09 PROCEDURE — 97140 MANUAL THERAPY 1/> REGIONS: CPT | Mod: GP | Performed by: PHYSICAL THERAPIST

## 2019-05-10 ENCOUNTER — OFFICE VISIT (OUTPATIENT)
Dept: OPHTHALMOLOGY | Facility: CLINIC | Age: 39
End: 2019-05-10
Payer: COMMERCIAL

## 2019-05-10 DIAGNOSIS — H51.11 CONVERGENCE INSUFFICIENCY: ICD-10-CM

## 2019-05-10 DIAGNOSIS — F07.81 POSTCONCUSSION SYNDROME: Primary | ICD-10-CM

## 2019-05-10 DIAGNOSIS — H52.13 MYOPIA OF BOTH EYES: ICD-10-CM

## 2019-05-10 ASSESSMENT — SLIT LAMP EXAM - LIDS
COMMENTS: NORMAL
COMMENTS: NORMAL

## 2019-05-10 ASSESSMENT — REFRACTION_MANIFEST
OD_CYLINDER: +1.75
OD_SPHERE: -6.50
OS_SPHERE: -6.25
OS_CYLINDER: SPHERE
OD_AXIS: 050

## 2019-05-10 ASSESSMENT — EXTERNAL EXAM - LEFT EYE: OS_EXAM: NORMAL

## 2019-05-10 ASSESSMENT — CONF VISUAL FIELD
OS_NORMAL: 1
METHOD: COUNTING FINGERS
OD_NORMAL: 1

## 2019-05-10 ASSESSMENT — VISUAL ACUITY
OD_CC+: -
OS_CC: 20/30
OD_CC: 20/30
METHOD: SNELLEN - LINEAR
CORRECTION_TYPE: CONTACTS

## 2019-05-10 ASSESSMENT — TONOMETRY
OS_IOP_MMHG: 14
OD_IOP_MMHG: 15
IOP_METHOD: ICARE

## 2019-05-10 ASSESSMENT — EXTERNAL EXAM - RIGHT EYE: OD_EXAM: NORMAL

## 2019-05-10 ASSESSMENT — CUP TO DISC RATIO
OD_RATIO: 0.25
OS_RATIO: 0.25
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
## 2019-05-14 ENCOUNTER — TELEPHONE (OUTPATIENT)
Dept: SURGERY | Facility: CLINIC | Age: 39
End: 2019-05-14

## 2019-05-16 ENCOUNTER — TELEPHONE (OUTPATIENT)
Dept: SURGERY | Facility: CLINIC | Age: 39
End: 2019-05-16

## 2019-05-16 ENCOUNTER — HOSPITAL ENCOUNTER (OUTPATIENT)
Dept: OCCUPATIONAL THERAPY | Facility: CLINIC | Age: 39
Setting detail: THERAPIES SERIES
End: 2019-05-16
Attending: NURSE PRACTITIONER
Payer: COMMERCIAL

## 2019-05-16 ENCOUNTER — TELEPHONE (OUTPATIENT)
Dept: OPHTHALMOLOGY | Facility: CLINIC | Age: 39
End: 2019-05-16

## 2019-05-16 ENCOUNTER — THERAPY VISIT (OUTPATIENT)
Dept: PHYSICAL THERAPY | Facility: CLINIC | Age: 39
End: 2019-05-16
Payer: COMMERCIAL

## 2019-05-16 DIAGNOSIS — M54.2 NECK PAIN: ICD-10-CM

## 2019-05-16 DIAGNOSIS — M54.6 ACUTE THORACIC BACK PAIN, UNSPECIFIED BACK PAIN LATERALITY: ICD-10-CM

## 2019-05-16 PROCEDURE — 97112 NEUROMUSCULAR REEDUCATION: CPT | Mod: GO

## 2019-05-16 PROCEDURE — 97140 MANUAL THERAPY 1/> REGIONS: CPT | Mod: GP | Performed by: PHYSICAL THERAPIST

## 2019-05-16 PROCEDURE — 97530 THERAPEUTIC ACTIVITIES: CPT | Mod: GO

## 2019-05-16 PROCEDURE — 97112 NEUROMUSCULAR REEDUCATION: CPT | Mod: GP | Performed by: PHYSICAL THERAPIST

## 2019-05-16 NOTE — TELEPHONE ENCOUNTER
"Note to Dr. Cohen to assist in request  Dyllan Ferreira RN 3:56 PM 05/16/19         Health Call Center    Phone Message    May a detailed message be left on voicemail: yes    Reason for Call: Other: Pt new Rx for glasses includes the phrase \"blue blocker recommended\".  The eye glass shoppe wants to know if there is a partiular shade of blue blocker required or recommended.  Please fas at [phone] or call at [phone]     Action Taken: Message routed to:  [facility] clinic    "
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
## 2019-05-23 ENCOUNTER — THERAPY VISIT (OUTPATIENT)
Dept: PHYSICAL THERAPY | Facility: CLINIC | Age: 39
End: 2019-05-23
Payer: COMMERCIAL

## 2019-05-23 ENCOUNTER — HOSPITAL ENCOUNTER (OUTPATIENT)
Dept: OCCUPATIONAL THERAPY | Facility: CLINIC | Age: 39
Setting detail: THERAPIES SERIES
End: 2019-05-23
Attending: NURSE PRACTITIONER
Payer: COMMERCIAL

## 2019-05-23 DIAGNOSIS — M54.2 NECK PAIN: ICD-10-CM

## 2019-05-23 DIAGNOSIS — M54.6 ACUTE THORACIC BACK PAIN, UNSPECIFIED BACK PAIN LATERALITY: ICD-10-CM

## 2019-05-23 PROCEDURE — 97140 MANUAL THERAPY 1/> REGIONS: CPT | Mod: GP | Performed by: PHYSICAL THERAPIST

## 2019-05-23 PROCEDURE — 97530 THERAPEUTIC ACTIVITIES: CPT | Mod: GO

## 2019-05-23 PROCEDURE — 97112 NEUROMUSCULAR REEDUCATION: CPT | Mod: GP | Performed by: PHYSICAL THERAPIST

## 2019-05-23 ASSESSMENT — PATIENT HEALTH QUESTIONNAIRE - PHQ9: SUM OF ALL RESPONSES TO PHQ QUESTIONS 1-9: 12

## 2019-05-28 NOTE — ADDENDUM NOTE
Encounter addended by: Zahira Sommers OT on: 5/28/2019 10:56 AM   Actions taken: Flowsheet data copied forward, Flowsheet accepted

## 2019-05-30 ENCOUNTER — MEDICAL CORRESPONDENCE (OUTPATIENT)
Dept: HEALTH INFORMATION MANAGEMENT | Facility: CLINIC | Age: 39
End: 2019-05-30

## 2019-05-30 ENCOUNTER — THERAPY VISIT (OUTPATIENT)
Dept: PHYSICAL THERAPY | Facility: CLINIC | Age: 39
End: 2019-05-30
Payer: COMMERCIAL

## 2019-05-30 DIAGNOSIS — M54.6 ACUTE THORACIC BACK PAIN, UNSPECIFIED BACK PAIN LATERALITY: ICD-10-CM

## 2019-05-30 DIAGNOSIS — K50.00 CROHN'S DISEASE OF SMALL INTESTINE (H): Primary | ICD-10-CM

## 2019-05-30 DIAGNOSIS — M54.2 NECK PAIN: ICD-10-CM

## 2019-05-30 PROCEDURE — 97112 NEUROMUSCULAR REEDUCATION: CPT | Mod: GP | Performed by: PHYSICAL THERAPIST

## 2019-05-30 PROCEDURE — 97140 MANUAL THERAPY 1/> REGIONS: CPT | Mod: GP | Performed by: PHYSICAL THERAPIST

## 2019-06-06 ENCOUNTER — HOSPITAL ENCOUNTER (OUTPATIENT)
Dept: OCCUPATIONAL THERAPY | Facility: CLINIC | Age: 39
Setting detail: THERAPIES SERIES
End: 2019-06-06
Attending: NURSE PRACTITIONER
Payer: COMMERCIAL

## 2019-06-06 PROCEDURE — 97530 THERAPEUTIC ACTIVITIES: CPT | Mod: GO

## 2019-06-10 ENCOUNTER — THERAPY VISIT (OUTPATIENT)
Dept: PHYSICAL THERAPY | Facility: CLINIC | Age: 39
End: 2019-06-10
Payer: COMMERCIAL

## 2019-06-10 DIAGNOSIS — M54.6 ACUTE THORACIC BACK PAIN, UNSPECIFIED BACK PAIN LATERALITY: ICD-10-CM

## 2019-06-10 DIAGNOSIS — M54.2 NECK PAIN: ICD-10-CM

## 2019-06-10 PROCEDURE — 97112 NEUROMUSCULAR REEDUCATION: CPT | Mod: GP | Performed by: PHYSICAL THERAPIST

## 2019-06-10 PROCEDURE — 97140 MANUAL THERAPY 1/> REGIONS: CPT | Mod: GP | Performed by: PHYSICAL THERAPIST

## 2019-06-11 ENCOUNTER — OFFICE VISIT (OUTPATIENT)
Dept: BEHAVIORAL HEALTH | Facility: CLINIC | Age: 39
End: 2019-06-11
Attending: NURSE PRACTITIONER
Payer: COMMERCIAL

## 2019-06-11 VITALS
WEIGHT: 136.9 LBS | BODY MASS INDEX: 22.1 KG/M2 | DIASTOLIC BLOOD PRESSURE: 75 MMHG | SYSTOLIC BLOOD PRESSURE: 111 MMHG | HEART RATE: 57 BPM

## 2019-06-11 DIAGNOSIS — F43.10 PTSD (POST-TRAUMATIC STRESS DISORDER): Primary | ICD-10-CM

## 2019-06-11 DIAGNOSIS — F41.1 GENERALIZED ANXIETY DISORDER: ICD-10-CM

## 2019-06-11 ASSESSMENT — ANXIETY QUESTIONNAIRES
GAD7 TOTAL SCORE: 12
3. WORRYING TOO MUCH ABOUT DIFFERENT THINGS: MORE THAN HALF THE DAYS
2. NOT BEING ABLE TO STOP OR CONTROL WORRYING: MORE THAN HALF THE DAYS
1. FEELING NERVOUS, ANXIOUS, OR ON EDGE: MORE THAN HALF THE DAYS
7. FEELING AFRAID AS IF SOMETHING AWFUL MIGHT HAPPEN: SEVERAL DAYS
GAD7 TOTAL SCORE: 12
5. BEING SO RESTLESS THAT IT IS HARD TO SIT STILL: MORE THAN HALF THE DAYS
6. BECOMING EASILY ANNOYED OR IRRITABLE: SEVERAL DAYS
GAD7 TOTAL SCORE: 12
4. TROUBLE RELAXING: MORE THAN HALF THE DAYS
7. FEELING AFRAID AS IF SOMETHING AWFUL MIGHT HAPPEN: SEVERAL DAYS
GAD7 TOTAL SCORE: 12
3. WORRYING TOO MUCH ABOUT DIFFERENT THINGS: MORE THAN HALF THE DAYS
GAD7 TOTAL SCORE: 12
7. FEELING AFRAID AS IF SOMETHING AWFUL MIGHT HAPPEN: SEVERAL DAYS
1. FEELING NERVOUS, ANXIOUS, OR ON EDGE: MORE THAN HALF THE DAYS
GAD7 TOTAL SCORE: 12
6. BECOMING EASILY ANNOYED OR IRRITABLE: SEVERAL DAYS
4. TROUBLE RELAXING: MORE THAN HALF THE DAYS
5. BEING SO RESTLESS THAT IT IS HARD TO SIT STILL: MORE THAN HALF THE DAYS
2. NOT BEING ABLE TO STOP OR CONTROL WORRYING: MORE THAN HALF THE DAYS
7. FEELING AFRAID AS IF SOMETHING AWFUL MIGHT HAPPEN: SEVERAL DAYS

## 2019-06-11 ASSESSMENT — PATIENT HEALTH QUESTIONNAIRE - PHQ9
SUM OF ALL RESPONSES TO PHQ QUESTIONS 1-9: 11

## 2019-06-11 NOTE — PATIENT INSTRUCTIONS
Scheduling: If you have any concerns about today's visit or wish to schedule another appointment please call our office during normal business hours [phone] (8-5:00 M-F)    Contact Us: Please call [phone] during business hours (8-5:00 M-F).  If after clinic hours, or on the weekend, please call  [phone].    Financial Assistance [phone]  [facility] Billing [phone]  [place] Billing [phone]  Medical Records [phone]    MENTAL HEALTH CRISIS NUMBERS:  [facility]:  [facility] - [phone]  Crisis Residence [facility]idence - [phone]  Walk-In Counseling Center \[facility]\"" - [phone]  COPE 24/7 [place] Mobile Team - [[phone]]  Crisis Connection - [phone]    [facility] - [phone]  Walk-in counseling [facility] - [phone]  Walk-in counseling [facility] - [phone]  Crisis Residence [facility] - [phone]  Urgent Care Adult Mental Health:   --Drop-in, 24/7 crisis line, and [facility] Mobile Team [[phone]]    24/7 CRISIS TEXT LINE: www.crisistextline.org OR text 845-918 anywhere, anytime, any crisis    Poison Control Center - [phone]  [facility] - [phone]; or [facility] - [phone]    If you have a medical emergency please call 911 or go to the nearest ER.

## 2019-06-11 NOTE — Clinical Note
Dana Holman, would you call Soo tomorrow and see how things are going with the prazosin and whether she needs a refill and whether she is interested in rescheduling with me?

## 2019-06-11 NOTE — Clinical Note
Actually, never mind about the follow up, I forgot I had intended to follow up with her at the psychiatry clinic. Actually, if you could tell her to call [phone] about scheduling follow up with me there, that would be awesome!
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
## 2019-06-11 NOTE — NURSING NOTE
Chief Complaint   Patient presents with     Recheck Medication     depression anxiety and PTSD     Would like out of today's visit:  Medication evaluation.  Manda Rich LPN

## 2019-06-12 ENCOUNTER — TELEPHONE (OUTPATIENT)
Dept: PSYCHIATRY | Facility: CLINIC | Age: 39
End: 2019-06-12

## 2019-06-12 ENCOUNTER — TELEPHONE (OUTPATIENT)
Dept: SURGERY | Facility: CLINIC | Age: 39
End: 2019-06-12

## 2019-06-12 ASSESSMENT — PATIENT HEALTH QUESTIONNAIRE - PHQ9
SUM OF ALL RESPONSES TO PHQ QUESTIONS 1-9: 11
SUM OF ALL RESPONSES TO PHQ QUESTIONS 1-9: 11

## 2019-06-12 ASSESSMENT — ANXIETY QUESTIONNAIRES
GAD7 TOTAL SCORE: 12
GAD7 TOTAL SCORE: 12

## 2019-06-12 NOTE — TELEPHONE ENCOUNTER
Social Work   Incoming/Outgoing Call  [facility]    Outgoing Call To: Soo Pearson  Callback number: [phone]    Reason for Call:  To follow up with patient about resources per Dr. Muse's request    Response/Plan:  Contacted parent of pt per request from provider to introduce role and offer support and resources.  Patient did not , left a brief message with callback number.     Hortencia Thayer   Social Work Intern   Psychiatry Clinic  [phone]

## 2019-06-13 ENCOUNTER — OFFICE VISIT (OUTPATIENT)
Dept: SURGERY | Facility: CLINIC | Age: 39
End: 2019-06-13
Payer: COMMERCIAL

## 2019-06-13 VITALS
BODY MASS INDEX: 21.86 KG/M2 | HEART RATE: 69 BPM | SYSTOLIC BLOOD PRESSURE: 117 MMHG | OXYGEN SATURATION: 99 % | WEIGHT: 136 LBS | HEIGHT: 66 IN | DIASTOLIC BLOOD PRESSURE: 75 MMHG

## 2019-06-13 DIAGNOSIS — F41.1 GAD (GENERALIZED ANXIETY DISORDER): ICD-10-CM

## 2019-06-13 DIAGNOSIS — F07.81 POST CONCUSSIVE SYNDROME: Primary | ICD-10-CM

## 2019-06-13 ASSESSMENT — MIFFLIN-ST. JEOR: SCORE: 1318.64
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
## 2019-06-13 NOTE — PATIENT INSTRUCTIONS
"Sleep:  You have to sleep to be able to handle all of the rest of this.  You are the only one that can make changes to support this.  You have many options, all have barriers, but despite this you have to make something different in your current sleep habits.  Sleep hygiene recommendations were give to you last visit.  Please review these.      HA:  Continue treating HA as are.      Neck pain:  Continue current meds as needed.  Follow up with muscle work with massage.  Do your exercises that PT gives you!  Try \"Yoga with Etelvina\".     Anxiety  Please make weekly appointments with Memo for the next 8 weeks.  Ask your girlfriend to be your direct support in one of the areas that is currently high on the anxiety list.  Voicemail vs legal things vs whatever.  Find a support group that speaks to you.  They are usually free.      Call me if you have issues.  MyChart.    "
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
## 2019-06-13 NOTE — Clinical Note
She sees you next week.  I am pretty worried about her.  I don't think her circumstance has changed dramatically but her ability to cope with it has.  Floridalma
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
## 2019-06-14 ENCOUNTER — TRANSFERRED RECORDS (OUTPATIENT)
Dept: HEALTH INFORMATION MANAGEMENT | Facility: CLINIC | Age: 39
End: 2019-06-14

## 2019-06-18 ENCOUNTER — TELEPHONE (OUTPATIENT)
Dept: BEHAVIORAL HEALTH | Facility: CLINIC | Age: 39
End: 2019-06-18

## 2019-06-18 ENCOUNTER — TELEPHONE (OUTPATIENT)
Dept: SURGERY | Facility: CLINIC | Age: 39
End: 2019-06-18

## 2019-06-18 NOTE — TELEPHONE ENCOUNTER
Visit Activities (Refresh list every visit): Phone Encounter    I called Soo at her request to connect since she had to miss today's appointment. Briefly discussed her current stress and need for therapy. She reports she has good family support and is coming back to [facility] today with ehr 11 year old daughter. She reports that her  will be moving out and they are .    She may connect with a previous therapist for some support, but we also added a sooner appointment for next Tuesday at 8am. She has our contact information if she needs to adjust that as things develop. We are also scheduled for mid-July.       Memo Longoria MA, OSIRISFT  Lead Behavioral Health Clinician  [facility]    [email or web address]       Phone: [phone] (mobility extension)  Pager: [phone]

## 2019-06-18 NOTE — TELEPHONE ENCOUNTER
Client called stating she left her  last night taking her child(charu) with her to her father's out of state. She called to cancel her appointment today, but would appreciate it if Memo could give her a call to check in. Memo Longoria was notified.

## 2019-06-18 NOTE — TELEPHONE ENCOUNTER
Patient reports having a confrontation with her  following her appointment with Floridalma. She left and traveled out of state. She has now returned and will be staying with friends.She called to let Floridalma know that she missed her appointment with Memo Longoria and has rescheduled it. Voiced concerns about transportation. Emphasized the importance of keeping this appointment and offered suggestions and education about [employer]. Patient receptive.

## 2019-06-20 ENCOUNTER — THERAPY VISIT (OUTPATIENT)
Dept: PHYSICAL THERAPY | Facility: CLINIC | Age: 39
End: 2019-06-20
Payer: COMMERCIAL

## 2019-06-20 DIAGNOSIS — M54.6 ACUTE THORACIC BACK PAIN, UNSPECIFIED BACK PAIN LATERALITY: ICD-10-CM

## 2019-06-20 DIAGNOSIS — M54.2 NECK PAIN: ICD-10-CM

## 2019-06-20 PROCEDURE — 97112 NEUROMUSCULAR REEDUCATION: CPT | Mod: GP | Performed by: PHYSICAL THERAPIST

## 2019-06-20 PROCEDURE — 97140 MANUAL THERAPY 1/> REGIONS: CPT | Mod: GP | Performed by: PHYSICAL THERAPIST

## 2019-06-21 ENCOUNTER — INFUSION THERAPY VISIT (OUTPATIENT)
Dept: INFUSION THERAPY | Facility: CLINIC | Age: 39
End: 2019-06-21
Attending: NURSE PRACTITIONER
Payer: COMMERCIAL

## 2019-06-21 ENCOUNTER — HOSPITAL ENCOUNTER (OUTPATIENT)
Dept: OCCUPATIONAL THERAPY | Facility: CLINIC | Age: 39
Setting detail: THERAPIES SERIES
End: 2019-06-21
Attending: NURSE PRACTITIONER
Payer: COMMERCIAL

## 2019-06-21 VITALS
OXYGEN SATURATION: 98 % | WEIGHT: 136 LBS | RESPIRATION RATE: 18 BRPM | SYSTOLIC BLOOD PRESSURE: 113 MMHG | BODY MASS INDEX: 21.95 KG/M2 | TEMPERATURE: 98 F | HEART RATE: 61 BPM | DIASTOLIC BLOOD PRESSURE: 72 MMHG

## 2019-06-21 DIAGNOSIS — K50.019 CROHN'S DISEASE OF ILEUM WITH COMPLICATION (H): Primary | ICD-10-CM

## 2019-06-21 PROCEDURE — 96413 CHEMO IV INFUSION 1 HR: CPT

## 2019-06-21 PROCEDURE — 96365 THER/PROPH/DIAG IV INF INIT: CPT

## 2019-06-21 PROCEDURE — 97530 THERAPEUTIC ACTIVITIES: CPT | Mod: GO

## 2019-06-21 PROCEDURE — 25000128 H RX IP 250 OP 636: Performed by: FAMILY MEDICINE

## 2019-06-21 PROCEDURE — 25800030 ZZH RX IP 258 OP 636: Performed by: FAMILY MEDICINE

## 2019-06-21 PROCEDURE — 97112 NEUROMUSCULAR REEDUCATION: CPT | Mod: GO

## 2019-06-21 RX ADMIN — VEDOLIZUMAB 300 MG: 300 INJECTION, POWDER, LYOPHILIZED, FOR SOLUTION INTRAVENOUS at 11:46

## 2019-06-21 ASSESSMENT — PAIN SCALES - GENERAL: PAINLEVEL: SEVERE PAIN (6)

## 2019-06-21 NOTE — PROGRESS NOTES
Infusion Nursing Note:  Soo NOONAN Shanelle presents today for Entyvio.    Patient seen by provider today: No   present during visit today: Not Applicable.    Note: N/A.    Intravenous Access:  Peripheral IV placed.    Treatment Conditions:  Results reviewed, labs MET treatment parameters, ok to proceed with treatment.  Labs done at [facility]. Results faxed here to [facility].       Post Infusion Assessment:  Patient tolerated infusion without incident.  Patient observed for 15 minutes post Entyvio per protocol.  Blood return noted pre and post infusion.  Site patent and intact, free from redness, edema or discomfort.  No evidence of extravasations.  Access discontinued per protocol.       Discharge Plan:   Discharge instructions reviewed with: Patient.  Patient and/or family verbalized understanding of discharge instructions and all questions answered.  Copy of AVS reviewed with patient and/or family.  Patient will return 8/16/19 for next appointment.  Patient discharged in stable condition accompanied by: self.  Departure Mode: Ambulatory.    Lady Warren RN

## 2019-07-05 ENCOUNTER — HOSPITAL ENCOUNTER (OUTPATIENT)
Dept: OCCUPATIONAL THERAPY | Facility: CLINIC | Age: 39
Setting detail: THERAPIES SERIES
End: 2019-07-05
Attending: NURSE PRACTITIONER
Payer: COMMERCIAL

## 2019-07-05 PROCEDURE — 97530 THERAPEUTIC ACTIVITIES: CPT | Mod: GO

## 2019-07-11 ENCOUNTER — THERAPY VISIT (OUTPATIENT)
Dept: PHYSICAL THERAPY | Facility: CLINIC | Age: 39
End: 2019-07-11
Payer: COMMERCIAL

## 2019-07-11 ENCOUNTER — HOSPITAL ENCOUNTER (OUTPATIENT)
Dept: OCCUPATIONAL THERAPY | Facility: CLINIC | Age: 39
Setting detail: THERAPIES SERIES
End: 2019-07-11
Attending: NURSE PRACTITIONER
Payer: COMMERCIAL

## 2019-07-11 DIAGNOSIS — M54.6 ACUTE THORACIC BACK PAIN, UNSPECIFIED BACK PAIN LATERALITY: ICD-10-CM

## 2019-07-11 DIAGNOSIS — M54.2 NECK PAIN: ICD-10-CM

## 2019-07-11 PROCEDURE — 97530 THERAPEUTIC ACTIVITIES: CPT | Mod: GO

## 2019-07-11 PROCEDURE — 97140 MANUAL THERAPY 1/> REGIONS: CPT | Mod: GP | Performed by: PHYSICAL THERAPIST

## 2019-07-19 ENCOUNTER — TELEPHONE (OUTPATIENT)
Dept: SURGERY | Facility: CLINIC | Age: 39
End: 2019-07-19

## 2019-07-25 ENCOUNTER — HOSPITAL ENCOUNTER (OUTPATIENT)
Dept: OCCUPATIONAL THERAPY | Facility: CLINIC | Age: 39
Setting detail: THERAPIES SERIES
End: 2019-07-25
Attending: NURSE PRACTITIONER
Payer: COMMERCIAL

## 2019-07-25 PROCEDURE — 97530 THERAPEUTIC ACTIVITIES: CPT | Mod: GO

## 2019-07-25 PROCEDURE — 97112 NEUROMUSCULAR REEDUCATION: CPT | Mod: GO

## 2019-07-26 NOTE — PROGRESS NOTES
"Outpatient Occupational Therapy Progress Note     Patient: Soo Timmons  : 1980    Beginning/End Dates of Reporting Period:  19 to 2019    Referring Provider: FRANCESCA Peoples CNP    Therapy Diagnosis: Decreased efficiency and independence with IADL    Client Self Report: Pt reports she continues to be affected by her stress levels and \"depression related stuff.\" Pt stated she has been cleared to begin work and has her first full shift back without accommodations in a few days. 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     Objective Measurements:     Objective Measure: Concussion Symptom Assessment   Details: Severity score =  9      Goals:     Goal Identifier Dynavision for reaction speed and divided attention   Goal Description Patient will average 50+ light hits with 3 digit numbers and no more than 1 error on Dynavision in order to increase reaction speed and divided attention for participation in ADL/IADL tasks   Target Date 19   Date Met  19   Progress: Goal met     Goal Identifier Memory accommodations   Goal Description Patient will verbalize and demonstrate up to 5 memory accommodations or compensation techniques she can utilize during work tasks for improved performance of ADL, IADL, work, and leisure tasks   Target Date 19   Date Met   19   Progress: Goal met     Goal Identifier Visual-motor   Goal Description Patient will be able to accurately complete up to 8 complex mazes in appropriate time without increased symptoms in order to improve tolerance needed for computer tasks at work and home.   Target Date 19   Date Met   19   Progress: Goal met     Goal Identifier Sequencing   Goal Description Patient will independently complete a complex 4 step sequencing task x 3 sessions to increase independence with ADL, IADL, work, and leisure skills.   Target Date " 07/22/19   Date Met      Progress:Progressing. Pt does demonstrate difficulty within busy environments. Continue goal     Goal Identifier IADL and work   Goal Description Patient will complete IADL or work-simulated task for 5 minutes in noisy environment with independence x 3 sessions to increase her attention to task with ADL, IADL, work, and leisure skills.    Target Date 07/22/19   Date Met      Progress: Progressing. Pt becomes distracted in dynamic environments and does require verbal cues to utilize accommodations. Continue goal     Progress Toward Goals:   Progress this reporting period: Pt has met 3/5 goals. Pt has made significant improvements with attention, visual skills, and other cognitive skills as needed for work related tasks. Pt will continue to benefit from skilled occupational therapy services as she transitions back into her full time employment to ensure safety and efficiency with return to work and other IADL tasks.     Plan:  Continue therapy per current plan of care.    Discharge:  No    Zahira Sommers OTR/L  [facility]  [phone]

## 2019-08-21 NOTE — PROGRESS NOTES
"Outpatient Occupational Therapy Discharge Note     Patient: Soo Timmons  : 1980    Beginning/End Dates of Reporting Period:  19 to 2019     Referring Provider: FRANCESCA Peoples CNP     Therapy Diagnosis: Decreased efficiency and independence with IADL     Client Self Report: After 19 pt did not attend any further scheduled OT session. Per phone discussion on 19, pt reports \"I didn't know I had any appointments left.\" Pt reports she has returned to work full time and is currently in the processing of moving. She reports things are going well overall and would like to be discharged from OT services. She stated \"I've been using a lot of the strategies you taught me, and I made myself some note cards for work to help me.\"     Objective Measurements:   Objective Measure: Concussion Symptom Assessment   Details: Severity score =  9    Goals:   Goal Identifier Dynavision for reaction speed and divided attention   Goal Description Patient will average 50+ light hits with 3 digit numbers and no more than 1 error on Dynavision in order to increase reaction speed and divided attention for participation in ADL/IADL tasks   Target Date 19   Date Met  19   Progress: Goal met      Goal Identifier Memory accommodations   Goal Description Patient will verbalize and demonstrate up to 5 memory accommodations or compensation techniques she can utilize during work tasks for improved performance of ADL, IADL, work, and leisure tasks   Target Date 19   Date Met   19   Progress: Goal met      Goal Identifier Visual-motor   Goal Description Patient will be able to accurately complete up to 8 complex mazes in appropriate time without increased symptoms in order to improve tolerance needed for computer tasks at work and home.   Target Date 19   Date Met   19   Progress: Goal met      Goal Identifier Sequencing   Goal Description Patient will independently complete a " complex 4 step sequencing task x 3 sessions to increase independence with ADL, IADL, work, and leisure skills.   Target Date 07/22/19   Date Met      Progress: Not met. Pt does demonstrate difficulty within busy environments      Goal Identifier IADL and work   Goal Description Patient will complete IADL or work-simulated task for 5 minutes in noisy environment with independence x 3 sessions to increase her attention to task with ADL, IADL, work, and leisure skills.    Target Date 07/22/19   Date Met      Progress: Not met. Pt becomes distracted in dynamic environments and does require verbal cues to utilize accommodations.         Progress Toward Goals: Pt has met 3/5 goals. Pt has made significant improvements with attention, visual skills, and other cognitive skills as needed for work related tasks. Per pt's report on phone conversation pt has returned to work full time and would like to discharge from OT services.     Plan:  Discharge from therapy.    Discharge:    Reason for Discharge: Patient chooses to discontinue therapy. Pt plans to return to OT as needed in the future should needs arise.     Equipment Issued: N/A    Discharge Plan: Patient to continue home program.    Zahira Sommers OTR/L  [facility]  [phone]

## 2019-08-21 NOTE — ADDENDUM NOTE
Encounter addended by: Zahira Sommers, KAREY on: 8/21/2019 3:36 PM   Actions taken: Sign clinical note, Episode resolved

## 2019-08-26 ENCOUNTER — TELEPHONE (OUTPATIENT)
Dept: FAMILY MEDICINE | Facility: CLINIC | Age: 39
End: 2019-08-26

## 2019-08-26 DIAGNOSIS — F32.0 MILD MAJOR DEPRESSION (H): ICD-10-CM

## 2019-08-26 DIAGNOSIS — K50.018 CROHN'S DISEASE OF ILEUM WITH OTHER COMPLICATION (H): ICD-10-CM

## 2019-08-26 NOTE — TELEPHONE ENCOUNTER
Prior Authorization Retail Medication Request    Medication/Dose: fluoxetine 20 mg   ICD code (if different than what is on RX):     Previously Tried and Failed:    Rationale:      Insurance Name:    Insurance ID:        Pharmacy Information (if different than what is on RX)  Name:    Phone:

## 2019-09-03 RX ORDER — FLUOXETINE 40 MG/1
40 CAPSULE ORAL DAILY
Qty: 90 CAPSULE | Refills: 0 | Status: SHIPPED | OUTPATIENT
Start: 2019-09-03 | End: 2019-10-22

## 2019-09-03 NOTE — TELEPHONE ENCOUNTER
Can the patient take capsules instead of tablets? Fluoxetine capsules are the preferred form of the medication with patient's insurance. Please send new Rx to pharmacy for capsules (might need PA also for quantity) or route back to me to continue with PA for tablets.

## 2019-09-05 ENCOUNTER — INFUSION THERAPY VISIT (OUTPATIENT)
Dept: INFUSION THERAPY | Facility: CLINIC | Age: 39
End: 2019-09-05
Attending: PHYSICIAN ASSISTANT
Payer: COMMERCIAL

## 2019-09-05 VITALS
DIASTOLIC BLOOD PRESSURE: 72 MMHG | HEART RATE: 63 BPM | TEMPERATURE: 98.8 F | BODY MASS INDEX: 22.14 KG/M2 | SYSTOLIC BLOOD PRESSURE: 117 MMHG | WEIGHT: 137.2 LBS | RESPIRATION RATE: 16 BRPM | OXYGEN SATURATION: 99 %

## 2019-09-05 DIAGNOSIS — K50.019 CROHN'S DISEASE OF ILEUM WITH COMPLICATION (H): Primary | ICD-10-CM

## 2019-09-05 PROCEDURE — 25000128 H RX IP 250 OP 636: Performed by: FAMILY MEDICINE

## 2019-09-05 PROCEDURE — 96365 THER/PROPH/DIAG IV INF INIT: CPT

## 2019-09-05 PROCEDURE — 25800030 ZZH RX IP 258 OP 636: Performed by: FAMILY MEDICINE

## 2019-09-05 RX ADMIN — VEDOLIZUMAB 300 MG: 300 INJECTION, POWDER, LYOPHILIZED, FOR SOLUTION INTRAVENOUS at 10:18

## 2019-09-05 ASSESSMENT — PAIN SCALES - GENERAL: PAINLEVEL: NO PAIN (0)

## 2019-09-05 NOTE — PROGRESS NOTES
Infusion Nursing Note:  Soo SARAN Timmons presents today for Entyvio.    Patient seen by provider today: No   present during visit today: Not Applicable.    Note: N/A.    Intravenous Access:  Peripheral IV placed.    Treatment Conditions:  Not Applicable.      Post Infusion Assessment:  Patient tolerated infusion without incident. VSS.  Patient observed for 15 minutes post Entyvio per protocol.  Site patent and intact, free from redness, edema or discomfort.  No evidence of extravasations.  Access discontinued per protocol.       Discharge Plan:   Copy of AVS reviewed with patient and/or family.  Patient will return 10/31 for next appointment.  Patient discharged in stable condition accompanied by: self.  Departure Mode: Ambulatory.    Judith Thompson RN

## 2019-09-05 NOTE — TELEPHONE ENCOUNTER
Central Prior Authorization Team   Phone: [phone]    PA Initiation    Medication: fluoxetine  Insurance Company: [employer] - Phone [phone] Fax [phone]  Pharmacy Filling the Rx: [id number] - [place], [place] - [address] [facility]  Filling Pharmacy Phone: [phone]  Filling Pharmacy Fax:    Start Date: 9/5/2019

## 2019-09-05 NOTE — TELEPHONE ENCOUNTER
Spoke with patient and she said her GI surgeon recommends tablets as the capsules give her stomach problems and indigestion.  Discussed with Rossi Ambrose and she said to forward back to PA team to obtain a PA for the tablets.

## 2019-09-05 NOTE — TELEPHONE ENCOUNTER
Central Prior Authorization Team   Phone: [phone]    Prior Authorization Approval    Authorization Effective Date: 6/5/2019  Authorization Expiration Date: 9/5/2020  Medication: fluoxetine  Approved Dose/Quantity: up to 120 tablets per 30 days  Reference #:     Insurance Company: [employer] [facility] - Phone [phone] Fax [phone]  Expected CoPay:       CoPay Card Available:      Foundation Assistance Needed:    Which Pharmacy is filling the prescription (Not needed for infusion/clinic administered): [id number] - [place] - [address] [facility]  Pharmacy Notified: Yes  Patient Notified: Yes  **Instructed pharmacy to notify patient when script is ready to /ship.**

## 2019-09-06 ENCOUNTER — MYC MEDICAL ADVICE (OUTPATIENT)
Dept: FAMILY MEDICINE | Facility: CLINIC | Age: 39
End: 2019-09-06

## 2019-09-06 DIAGNOSIS — R11.0 NAUSEA: Primary | ICD-10-CM

## 2019-09-06 RX ORDER — ONDANSETRON 4 MG/1
4 TABLET, FILM COATED ORAL EVERY 8 HOURS PRN
Qty: 8 TABLET | Refills: 0 | Status: SHIPPED | OUTPATIENT
Start: 2019-09-06 | End: 2019-10-22

## 2019-09-06 NOTE — TELEPHONE ENCOUNTER
"Huddled with KL, OK to send 4MG of Zofran #8     Responded via [employer]    Accidentally signed order as \"Ordering Mode: Fulfillment Order - No MD Sign\". KL notified and ok with order.    Barbi Alves RN, BSN    "

## 2019-09-28 ENCOUNTER — HEALTH MAINTENANCE LETTER (OUTPATIENT)
Age: 39
End: 2019-09-28

## 2019-10-07 PROBLEM — M54.6 ACUTE THORACIC BACK PAIN, UNSPECIFIED BACK PAIN LATERALITY: Status: RESOLVED | Noted: 2019-02-15 | Resolved: 2019-10-07

## 2019-10-07 PROBLEM — M54.2 NECK PAIN: Status: RESOLVED | Noted: 2019-02-15 | Resolved: 2019-10-07

## 2019-10-07 NOTE — PROGRESS NOTES
"Discharge Note    Progress reporting period is from last progress note on   to Jul 11, 2019.    Soo failed to follow up and current status is unknown.  Please see information below for last relevant information on current status.  Patient seen for 21 visits.    SUBJECTIVE  Subjective changes noted by patient:  Pt reports she has been having some bad headaches the past week, primarily on the L side and down the levator scapula muscle. Pain today rated at 5/10 level, last pm at 7/10 level.   .  Current pain level is 7/10.     Previous pain level was  2/10.   Changes in function:  Yes (See Goal flowsheet attached for changes in current functional level)  Adverse reaction to treatment or activity: None    OBJECTIVE  Changes noted in objective findings: ROM flexion chin 4\" to sternum, painful and pulling, ext 72 degrees, pain at scapula, SB L 35, R 27, tight and painful B, rotation L 60, R 50, tight and painful.      ASSESSMENT/PLAN  Diagnosis: whiplash, neck/thoracic pain   Updated problem list and treatment plan:   Pain - HEP  Decreased ROM/flexibility - HEP  Decreased function - HEP  STG/LTGs have been met or progress has been made towards goals:  Yes, please see goal flowsheet for most current information  Assessment of Progress: current status is unknown.    Last current status: Pt is progressing as expected   Self Management Plans:  HEP  I have re-evaluated this patient and find that the nature, scope, duration and intensity of the therapy is appropriate for the medical condition of the patient.  Soo continues to require the following intervention to meet STG and LTG's:  HEP.    Recommendations:  Discharge with current home program.  Patient to follow up with MD as needed.    Please refer to the daily flowsheet for treatment today, total treatment time and time spent performing 1:1 timed codes.      "

## 2019-10-22 ENCOUNTER — OFFICE VISIT (OUTPATIENT)
Dept: FAMILY MEDICINE | Facility: CLINIC | Age: 39
End: 2019-10-22
Payer: COMMERCIAL

## 2019-10-22 VITALS
RESPIRATION RATE: 18 BRPM | SYSTOLIC BLOOD PRESSURE: 90 MMHG | DIASTOLIC BLOOD PRESSURE: 66 MMHG | OXYGEN SATURATION: 99 % | BODY MASS INDEX: 22.05 KG/M2 | TEMPERATURE: 98.3 F | HEART RATE: 70 BPM | HEIGHT: 66 IN | WEIGHT: 137.2 LBS

## 2019-10-22 DIAGNOSIS — K52.9 COLITIS: ICD-10-CM

## 2019-10-22 DIAGNOSIS — F41.1 GENERALIZED ANXIETY DISORDER: ICD-10-CM

## 2019-10-22 DIAGNOSIS — J40 BRONCHITIS: Primary | ICD-10-CM

## 2019-10-22 DIAGNOSIS — F33.1 MAJOR DEPRESSIVE DISORDER, RECURRENT EPISODE, MODERATE (H): ICD-10-CM

## 2019-10-22 DIAGNOSIS — Z23 NEED FOR PROPHYLACTIC VACCINATION AND INOCULATION AGAINST INFLUENZA: ICD-10-CM

## 2019-10-22 PROCEDURE — 90471 IMMUNIZATION ADMIN: CPT | Performed by: NURSE PRACTITIONER

## 2019-10-22 PROCEDURE — 99214 OFFICE O/P EST MOD 30 MIN: CPT | Mod: 25 | Performed by: NURSE PRACTITIONER

## 2019-10-22 PROCEDURE — 90686 IIV4 VACC NO PRSV 0.5 ML IM: CPT | Performed by: NURSE PRACTITIONER

## 2019-10-22 PROCEDURE — 96127 BRIEF EMOTIONAL/BEHAV ASSMT: CPT | Performed by: NURSE PRACTITIONER

## 2019-10-22 RX ORDER — AZITHROMYCIN 250 MG/1
TABLET, FILM COATED ORAL
Qty: 6 TABLET | Refills: 0 | Status: SHIPPED | OUTPATIENT
Start: 2019-10-22 | End: 2019-12-23

## 2019-10-22 RX ORDER — BENZONATATE 100 MG/1
100 CAPSULE ORAL 3 TIMES DAILY PRN
Qty: 30 CAPSULE | Refills: 0 | Status: SHIPPED | OUTPATIENT
Start: 2019-10-22 | End: 2019-12-23

## 2019-10-22 ASSESSMENT — MIFFLIN-ST. JEOR: SCORE: 1319.09

## 2019-10-22 ASSESSMENT — PATIENT HEALTH QUESTIONNAIRE - PHQ9
10. IF YOU CHECKED OFF ANY PROBLEMS, HOW DIFFICULT HAVE THESE PROBLEMS MADE IT FOR YOU TO DO YOUR WORK, TAKE CARE OF THINGS AT HOME, OR GET ALONG WITH OTHER PEOPLE: EXTREMELY DIFFICULT
SUM OF ALL RESPONSES TO PHQ QUESTIONS 1-9: 15
SUM OF ALL RESPONSES TO PHQ QUESTIONS 1-9: 15

## 2019-10-22 ASSESSMENT — ENCOUNTER SYMPTOMS: COUGH: 1

## 2019-10-22 NOTE — LETTER
[facility]  [address] [place] [address], [address]  [facility] [address]  Phone: [phone]  Fax: [phone]    October 22, 2019        Soo Timmons  [address] [address]  [facility] [address]          To whom it may concern:    RE: Soo Timmons    Patient was seen and treated today at our clinic.   Please excuse her from work on day 10/21/2019.     Please contact me for questions or concerns.      Sincerely,        FRANCESCA Jones CNP, MA

## 2019-10-22 NOTE — LETTER
[facility]  [address] [place] [address], [address]  [facility] [address]  Phone: [phone]  Fax: [phone]    October 22, 2019        Soo Timmons  [address] [address] [facility] [address]  [facility] [address]          To whom it may concern:    RE: Soo Timmons    Patient was seen and treated today at our clinic.  Please excuse her absence from class on 10/21/2019.     Please contact me for questions or concerns.      Sincerely,        FRANCESCA Jones CNP, MA

## 2019-10-23 ASSESSMENT — PATIENT HEALTH QUESTIONNAIRE - PHQ9: SUM OF ALL RESPONSES TO PHQ QUESTIONS 1-9: 15

## 2019-11-06 ENCOUNTER — INFUSION THERAPY VISIT (OUTPATIENT)
Dept: INFUSION THERAPY | Facility: CLINIC | Age: 39
End: 2019-11-06
Attending: PHYSICIAN ASSISTANT

## 2019-11-06 VITALS
HEART RATE: 64 BPM | BODY MASS INDEX: 21.85 KG/M2 | OXYGEN SATURATION: 100 % | DIASTOLIC BLOOD PRESSURE: 62 MMHG | SYSTOLIC BLOOD PRESSURE: 106 MMHG | TEMPERATURE: 97.8 F | RESPIRATION RATE: 16 BRPM | WEIGHT: 135.4 LBS

## 2019-11-06 DIAGNOSIS — K50.019 CROHN'S DISEASE OF ILEUM WITH COMPLICATION (H): Primary | ICD-10-CM

## 2019-11-06 PROCEDURE — 25800030 ZZH RX IP 258 OP 636: Performed by: FAMILY MEDICINE

## 2019-11-06 PROCEDURE — 96365 THER/PROPH/DIAG IV INF INIT: CPT

## 2019-11-06 PROCEDURE — 25000128 H RX IP 250 OP 636: Performed by: FAMILY MEDICINE

## 2019-11-06 RX ADMIN — VEDOLIZUMAB 300 MG: 300 INJECTION, POWDER, LYOPHILIZED, FOR SOLUTION INTRAVENOUS at 11:46

## 2019-11-06 ASSESSMENT — PAIN SCALES - GENERAL: PAINLEVEL: NO PAIN (0)

## 2019-11-06 NOTE — PROGRESS NOTES
Infusion Nursing Note:  Soo SARAN Timmons presents today for Entyvio.    Patient seen by provider today: No   present during visit today: Not Applicable.    Note: N/A.    Intravenous Access:  Peripheral IV placed.    Treatment Conditions:  Not Applicable.      Post Infusion Assessment:  Patient tolerated infusion without incident.  Patient observed for 15 minutes post entyvio per protocol.  Blood return noted pre and post infusion.  Site patent and intact, free from redness, edema or discomfort.  No evidence of extravasations.  Access discontinued per protocol.       Discharge Plan:   Discharge instructions reviewed with: Patient.  Patient and/or family verbalized understanding of discharge instructions and all questions answered.  Patient discharged in stable condition accompanied by: self.  Departure Mode: Ambulatory.    Lady Warren RN

## 2019-11-26 ENCOUNTER — TRANSFERRED RECORDS (OUTPATIENT)
Dept: HEALTH INFORMATION MANAGEMENT | Facility: CLINIC | Age: 39
End: 2019-11-26

## 2019-11-26 LAB
C TRACH DNA SPEC QL PROBE+SIG AMP: NEGATIVE
HIV 1&2 EXT: NORMAL
HPV ABSTRACT: ABNORMAL
N GONORRHOEA DNA SPEC QL PROBE+SIG AMP: NEGATIVE
PAP-ABSTRACT: ABNORMAL
SPECIMEN DESCRIP: NORMAL
SPECIMEN DESCRIPTION: NORMAL

## 2019-12-19 NOTE — PROGRESS NOTES
Subjective     Soo Timmons is a 39 year old female who presents to clinic today for the following health issues:    History of Present Illness        Mental Health Follow-up:  Patient presents to follow-up on Depression & Anxiety.Patient's depression since last visit has been:  Better  The patient is having other symptoms associated with depression.  Patient's anxiety since last visit has been:  Medium  The patient is having other symptoms associated with anxiety.  Any significant life events: relationship concerns, financial concerns, housing concerns and other  Patient is feeling anxious or having panic attacks.  Patient has no concerns about alcohol or drug use.     Social History  Tobacco Use    Smoking status: Never Smoker    Smokeless tobacco: Never Used  Alcohol use: No    Alcohol/week: 0.0 standard drinks  Drug use: No      Today's PHQ-9         PHQ-9 Total Score:     (P) 14   PHQ-9 Q9 Thoughts of better off dead/self-harm past 2 weeks :   (P) Several days   Thoughts of suicide or self harm:  (P) No   Self-harm Plan:        Self-harm Action:          Safety concerns for self or others: (P) No         She eats 0-1 servings of fruits and vegetables daily.She consumes 2 sweetened beverage(s) daily.She is missing 7 dose(s) of medications per week.  She is not taking prescribed medications regularly due to other.       Requesting Forms for school signed and  TB Gold drawn.     She reports that she has been having intense headaches since concussion where she will have nausea and emesis. Not sure if due to stress with divorce, or school, etc.     Answers for HPI/ROS submitted by the patient on 12/23/2019   Chronic problems general questions HPI Form  If you checked off any problems, how difficult have these problems made it for you to do your work, take care of things at home, or get along with other people?: Very difficult  PHQ9 TOTAL SCORE: 14  JUJU 7 TOTAL SCORE: 6      She denies active SI, but states she  would like to go to sleep and not wake up. She endorses a few binge drinking sessions where she is with others who take care of her. 2 in the last 6 months, but otherwise is not drinking. She has not been taking her medications as she lost them.     She is wondering if she needs to be evaluated for ADHD; she has previously had neuro-psych testing s/p concussion. She rates her anxiety has been good this week, but she will occasionally have chest pain /tightness with her panic attacks with associated hyperventilation. She relates that sh could benefit from counseling and that going every other week is doable for her.     Patient Active Problem List   Diagnosis     Migraine with aura     Fatigue     Surveillance of previously prescribed intrauterine contraceptive device     Anemia     CARDIOVASCULAR SCREENING; LDL GOAL LESS THAN 160     PTSD (post-traumatic stress disorder)     Health Care Home     Generalized anxiety disorder     Colitis     GERD (gastroesophageal reflux disease)     Crohn's ileitis (H)     Malabsorption     Inflammatory bowel disease (Crohn's disease) (H)     Iron deficiency anemia     Vitamin B 12 deficiency     Irritable bowel syndrome     Past Surgical History:   Procedure Laterality Date     COLONOSCOPY  10/04/06    Luana Bey [facility]      COLONOSCOPY  12/20/2013    Procedure: COMBINED COLONOSCOPY, SINGLE BIOPSY/POLYPECTOMY BY BIOPSY;;  Surgeon: Presley Ocampo MD;  Location:  OR      REMOVE INTRAUTERINE DEVICE  09/02/08     [facility] UGI ENDOSCOPY, SIMPLE EXAM  10/4/2006     LAPAROSCOPIC CHOLECYSTECTOMY  5/16/2011    Procedure:LAPAROSCOPIC CHOLECYSTECTOMY; Surgeon:LIYAH AVELAR; Location: OR       Social History     Tobacco Use     Smoking status: Never Smoker     Smokeless tobacco: Never Used   Substance Use Topics     Alcohol use: No     Alcohol/week: 0.0 standard drinks     Family History   Problem Relation Age of Onset     Hypertension Mother      Alzheimer Disease Maternal Grandmother       Asthma No family hx of      C.A.D. No family hx of      Diabetes No family hx of      Cerebrovascular Disease No family hx of      Breast Cancer No family hx of      Cancer - colorectal No family hx of      Prostate Cancer No family hx of      Alcohol/Drug No family hx of      Allergies No family hx of      Anesthesia Reaction No family hx of      Arthritis No family hx of      Blood Disease No family hx of      Cancer No family hx of      Circulatory No family hx of      Depression No family hx of      Endocrine Disease No family hx of      Eye Disorder No family hx of      Genetic Disorder No family hx of      Gynecology No family hx of      Gastrointestinal Disease No family hx of      Genitourinary Problems No family hx of      Heart Disease No family hx of      Lipids No family hx of      Neurologic Disorder No family hx of      Obesity No family hx of      Hearing Loss No family hx of      Respiratory No family hx of      Osteoporosis No family hx of      Musculoskeletal Disorder No family hx of      Thyroid Disease No family hx of      Psychotic Disorder No family hx of      Cardiovascular No family hx of      Congenital Anomalies No family hx of      Connective Tissue Disorder No family hx of      Coronary Artery Disease No family hx of      Hyperlipidemia No family hx of      Ovarian Cancer No family hx of      Depression/Anxiety No family hx of      Thyroid Disease No family hx of      Chemical Addiction No family hx of      Known Genetic Syndrome No family hx of      Anxiety Disorder No family hx of      Mental Illness No family hx of      Substance Abuse No family hx of      Colon Cancer No family hx of      Other Cancer No family hx of      Glaucoma No family hx of      Macular Degeneration No family hx of          Current Outpatient Medications   Medication Sig Dispense Refill     calcium carbonate-vitamin D (OS-RUDOLPH) 500-400 MG-UNIT tablet Take 1 tablet by mouth daily       FLUoxetine 20 MG tablet  Take 2 tablets (40 mg) by mouth daily 180 tablet 0     Magnesium 125 MG CAPS Take by mouth daily       MIRENA 20 MCG/24HR IU IUD intrauterine uterine device placed 1 Intra Uterine Device 0     multivitamin, therapeutic with minerals (MULTI-VITAMIN) TABS Take 1 tablet by mouth daily       Omega-3 Fatty Acids (OMEGA ESSENTIALS BASIC PO) Take by mouth daily       Probiotic Product (PROBIOTIC ADVANCED PO)        vedolizumab (ENTYVIO) 60 MG/ML injection Inject 5 mLs (300 mg) into the vein every 2 months - NOt true SIG> 1 each 0     Acetaminophen (TYLENOL PO) Take 500 mg by mouth       pantoprazole (PROTONIX) 40 MG EC tablet Take 1 tablet (40 mg) by mouth daily (Patient not taking: Reported on 12/23/2019) 90 tablet 3     Allergies   Allergen Reactions     Cimzia [Certolizumab Pegol] Other (See Comments)     Redness and swelling @ injection site     Humira Other (See Comments)     Inflammation, redness and swelling @ injection site     Remicade [Infliximab] Swelling     Banana      Anaphylaxis     Latex      Anaphylaxis     Prilosec [Omeprazole Magnesium] Diarrhea     Recent Labs   Lab Test 04/05/19  0927 11/30/18  0844 10/05/18  0851  01/02/18  2147 12/26/17  1910  05/22/17  0959  01/09/15  0816  06/24/13  1040 07/12/12  0819   LDL  --   --   --   --   --   --   --  73  --   --   --   --  79   HDL  --   --   --   --   --   --   --  31*  --   --   --   --  28*   TRIG  --   --   --   --   --   --   --  75  --   --   --   --  83   ALT 30 29 16   < > 14 13   < >  --    < > 9   < > 16  --    CR  --   --   --   --  0.90 0.80   < >  --    < > 0.86   < > 0.73  --    GFRESTIMATED  --   --   --   --  70 80   < >  --    < > 76   < > >90  --    GFRESTBLACK  --   --   --   --  85 >90   < >  --    < > >90   GFR Calc     < > >90  --    POTASSIUM  --   --   --   --  3.4 4.0   < >  --    < > 3.4   < > 3.8  --    TSH  --   --   --   --   --   --   --   --   --  2.49  --  2.16 2.11    < > = values in this interval not  "displayed.      BP Readings from Last 3 Encounters:   12/23/19 92/66   11/06/19 106/62   10/22/19 90/66    Wt Readings from Last 3 Encounters:   12/23/19 60.5 kg (133 lb 4.8 oz)   11/06/19 61.4 kg (135 lb 6.4 oz)   10/22/19 62.2 kg (137 lb 3.2 oz)        Reviewed and updated as needed this visit by Provider  Tobacco  Allergies  Meds  Problems  Med Hx  Surg Hx  Fam Hx         Review of Systems   ROS COMP: Constitutional, HEENT, cardiovascular, pulmonary, GI, , musculoskeletal, neuro, skin, endocrine and psych systems are negative, except as otherwise noted.    This document serves as a record of the services and decisions personally performed and made by Rossi Ambrose CNP. It was created on his/her behalf by Negin Cohen, trained medical scribe. The creation of this document is based the provider's statements to the medical scribes.    Scribe Negin Cohen 11:42 AM, December 23, 2019      Objective    BP 92/66   Pulse 74   Temp 98.6  F (37  C) (Temporal)   Resp 18   Ht 1.676 m (5' 6\")   Wt 60.5 kg (133 lb 4.8 oz)   LMP  (LMP Unknown)   SpO2 100%   Breastfeeding No   BMI 21.52 kg/m    Body mass index is 21.52 kg/m .     Physical Exam   GENERAL: healthy, alert and no distress  HENT: ear canals and TM's normal, nose and mouth without ulcers or lesions  NECK: no adenopathy, no asymmetry, masses, or scars and thyroid normal to palpation  RESP: lungs clear to auscultation - no rales, rhonchi or wheezes  CV: regular rate and rhythm, normal S1 S2, no S3 or S4, no murmur, click or rub, no peripheral edema and peripheral pulses strong  NEURO: Normal strength and tone, mentation intact and speech normal  PSYCH: mentation appears normal, affect normal/bright    Diagnostic Test Results:  Labs reviewed in Epic  No results found for this or any previous visit (from the past 24 hour(s)).        Assessment & Plan       ICD-10-CM    1. Mild major depression (H) F32.0 FLUoxetine 20 MG tablet     TSH with free T4 " reflex   2. Crohn's disease of ileum with other complication (H) K50.018 FLUoxetine 20 MG tablet   3. Encounter for administrative examinations Z02.9 HIV Screening     Quantiferon TB Gold Plus   4. Need for prophylactic vaccination against Streptococcus pneumoniae (pneumococcus) and influenza Z23 Pneumococcal vaccine 13 valent PCV13 IM (Prevnar) [16218]     VACCINE ADMINISTRATION, INITIAL   5. Screening examination for pulmonary tuberculosis Z11.1 Quantiferon TB Gold Plus     Discussed migraine, crohn's disease, mood, sleep, healthy diet, and regular exercise at length with patient today.     Updated routine screening lab work with quantiferon TB gold lab and one time HIV screening lab placed today; will notify with results. Paperwork for patient's school completed today.     Patient notes she had lost her previous prescription and so has not been taking it. Medications are well tolerated with no reported side effects. Plan to restart Prozac 40 mg; Refills provided. Advised that she follow up with counseling. Urged as suboptimals control of anxiety, HA's and concentration. Situational stressors, but chronic issues and discussed at length. No activity restrictions and patient is functioning.     Pneumonia-13 vaccination administered today.     Follow up with PCP in 1 month for annual physical exam with pap test, medication management, and routine lab work or prn.      The information in this document, created by the medical scribe for me, accurately reflects the services I personally performed and the decisions made by me. I have reviewed and approved this document for accuracy prior to leaving the patient care area.  Rossi Ambrose CNP  11:42 AM, 12/23/19    FRANCESCA Jones CNP  [facility]

## 2019-12-23 ENCOUNTER — OFFICE VISIT (OUTPATIENT)
Dept: FAMILY MEDICINE | Facility: CLINIC | Age: 39
End: 2019-12-23
Payer: MEDICAID

## 2019-12-23 VITALS
TEMPERATURE: 98.6 F | DIASTOLIC BLOOD PRESSURE: 66 MMHG | HEART RATE: 74 BPM | SYSTOLIC BLOOD PRESSURE: 92 MMHG | OXYGEN SATURATION: 100 % | HEIGHT: 66 IN | BODY MASS INDEX: 21.42 KG/M2 | RESPIRATION RATE: 18 BRPM | WEIGHT: 133.3 LBS

## 2019-12-23 DIAGNOSIS — Z02.9 ENCOUNTER FOR ADMINISTRATIVE EXAMINATIONS: ICD-10-CM

## 2019-12-23 DIAGNOSIS — F32.0 MILD MAJOR DEPRESSION (H): Primary | ICD-10-CM

## 2019-12-23 DIAGNOSIS — Z11.1 SCREENING EXAMINATION FOR PULMONARY TUBERCULOSIS: ICD-10-CM

## 2019-12-23 DIAGNOSIS — Z23 NEED FOR PROPHYLACTIC VACCINATION AGAINST STREPTOCOCCUS PNEUMONIAE (PNEUMOCOCCUS) AND INFLUENZA: ICD-10-CM

## 2019-12-23 DIAGNOSIS — K50.018 CROHN'S DISEASE OF ILEUM WITH OTHER COMPLICATION (H): ICD-10-CM

## 2019-12-23 LAB — TSH SERPL DL<=0.005 MIU/L-ACNC: 1.51 MU/L (ref 0.4–4)

## 2019-12-23 PROCEDURE — 96127 BRIEF EMOTIONAL/BEHAV ASSMT: CPT | Performed by: NURSE PRACTITIONER

## 2019-12-23 PROCEDURE — 36415 COLL VENOUS BLD VENIPUNCTURE: CPT | Performed by: NURSE PRACTITIONER

## 2019-12-23 PROCEDURE — 90471 IMMUNIZATION ADMIN: CPT | Performed by: NURSE PRACTITIONER

## 2019-12-23 PROCEDURE — 86481 TB AG RESPONSE T-CELL SUSP: CPT | Performed by: NURSE PRACTITIONER

## 2019-12-23 PROCEDURE — 99214 OFFICE O/P EST MOD 30 MIN: CPT | Mod: 25 | Performed by: NURSE PRACTITIONER

## 2019-12-23 PROCEDURE — 84443 ASSAY THYROID STIM HORMONE: CPT | Performed by: NURSE PRACTITIONER

## 2019-12-23 PROCEDURE — 90670 PCV13 VACCINE IM: CPT | Performed by: NURSE PRACTITIONER

## 2019-12-23 RX ORDER — FLUOXETINE 20 MG/1
40 TABLET, FILM COATED ORAL DAILY
Qty: 180 TABLET | Refills: 0 | Status: SHIPPED | OUTPATIENT
Start: 2019-12-23 | End: 2020-05-18 | Stop reason: DRUGHIGH

## 2019-12-23 ASSESSMENT — PATIENT HEALTH QUESTIONNAIRE - PHQ9
SUM OF ALL RESPONSES TO PHQ QUESTIONS 1-9: 14
10. IF YOU CHECKED OFF ANY PROBLEMS, HOW DIFFICULT HAVE THESE PROBLEMS MADE IT FOR YOU TO DO YOUR WORK, TAKE CARE OF THINGS AT HOME, OR GET ALONG WITH OTHER PEOPLE: VERY DIFFICULT
SUM OF ALL RESPONSES TO PHQ QUESTIONS 1-9: 14

## 2019-12-23 ASSESSMENT — ANXIETY QUESTIONNAIRES
3. WORRYING TOO MUCH ABOUT DIFFERENT THINGS: MORE THAN HALF THE DAYS
7. FEELING AFRAID AS IF SOMETHING AWFUL MIGHT HAPPEN: NOT AT ALL
4. TROUBLE RELAXING: SEVERAL DAYS
5. BEING SO RESTLESS THAT IT IS HARD TO SIT STILL: NOT AT ALL
1. FEELING NERVOUS, ANXIOUS, OR ON EDGE: SEVERAL DAYS
GAD7 TOTAL SCORE: 6
7. FEELING AFRAID AS IF SOMETHING AWFUL MIGHT HAPPEN: NOT AT ALL
2. NOT BEING ABLE TO STOP OR CONTROL WORRYING: MORE THAN HALF THE DAYS
6. BECOMING EASILY ANNOYED OR IRRITABLE: NOT AT ALL
GAD7 TOTAL SCORE: 6
GAD7 TOTAL SCORE: 6

## 2019-12-23 ASSESSMENT — MIFFLIN-ST. JEOR: SCORE: 1301.39

## 2019-12-23 NOTE — NURSING NOTE
Prior to immunization administration, verified patients identity using patient s name and date of birth. Please see Immunization Activity for additional information.     Screening Questionnaire for Adult Immunization    Are you sick today?   No   Do you have allergies to medications, food, a vaccine component or latex?   No   Have you ever had a serious reaction after receiving a vaccination?   No   Do you have a long-term health problem with heart, lung, kidney, or metabolic disease (e.g., diabetes), asthma, a blood disorder, no spleen, complement component deficiency, a cochlear implant, or a spinal fluid leak?  Are you on long-term aspirin therapy?   No   Do you have cancer, leukemia, HIV/AIDS, or any other immune system problem?   No   Do you have a parent, brother, or sister with an immune system problem?   No   In the past 3 months, have you taken medications that affect  your immune system, such as prednisone, other steroids, or anticancer drugs; drugs for the treatment of rheumatoid arthritis, Crohn s disease, or psoriasis; or have you had radiation treatments?   No   Have you had a seizure, or a brain or other nervous system problem?   No   During the past year, have you received a transfusion of blood or blood    products, or been given immune (gamma) globulin or antiviral drug?   No   For women: Are you pregnant or is there a chance you could become       pregnant during the next month?   No   Have you received any vaccinations in the past 4 weeks?   No     Immunization questionnaire answers were all negative.        Per orders of LUCY, injection of  P13 given by Mary Capellan. Patient instructed to remain in clinic for 15 minutes afterwards, and to report any adverse reaction to me immediately.

## 2019-12-24 ASSESSMENT — ANXIETY QUESTIONNAIRES: GAD7 TOTAL SCORE: 6

## 2019-12-26 ENCOUNTER — TELEPHONE (OUTPATIENT)
Dept: FAMILY MEDICINE | Facility: CLINIC | Age: 39
End: 2019-12-26

## 2019-12-26 LAB
GAMMA INTERFERON BACKGROUND BLD IA-ACNC: 0.12 IU/ML
M TB IFN-G BLD-IMP: NEGATIVE
M TB IFN-G CD4+ BCKGRND COR BLD-ACNC: 7.8 IU/ML
MITOGEN IGNF BCKGRD COR BLD-ACNC: 0 IU/ML
MITOGEN IGNF BCKGRD COR BLD-ACNC: 0.05 IU/ML

## 2019-12-26 NOTE — TELEPHONE ENCOUNTER
Left message asking patient to return call.  Please inform patient of RESULTS from Provider below.       Rossi Ambrose is out of the office. Your thyroid results came back normal.    Your TB test came back normal.   Please call the clinic with any questions you may have.     Have a great day,     Dr. Russ

## 2019-12-26 NOTE — TELEPHONE ENCOUNTER
Patient received message and voiced understanding.  Patient would also like this to be released to My Chart so she can show the results to her school.   Ok to leave a detailed message

## 2019-12-27 NOTE — TELEPHONE ENCOUNTER
Results are released.    Mikie Esposito MD, FAAFP  Family Medicine Physician  [facility] [place]  [address] [place] [address]

## 2020-01-05 ENCOUNTER — MYC MEDICAL ADVICE (OUTPATIENT)
Dept: FAMILY MEDICINE | Facility: CLINIC | Age: 40
End: 2020-01-05

## 2020-01-06 ENCOUNTER — ALLIED HEALTH/NURSE VISIT (OUTPATIENT)
Dept: FAMILY MEDICINE | Facility: OTHER | Age: 40
End: 2020-01-06
Payer: MEDICAID

## 2020-01-06 ENCOUNTER — TELEPHONE (OUTPATIENT)
Dept: FAMILY MEDICINE | Facility: CLINIC | Age: 40
End: 2020-01-06

## 2020-01-06 DIAGNOSIS — Z23 NEED FOR VACCINATION: Primary | ICD-10-CM

## 2020-01-06 DIAGNOSIS — Z23 NEED FOR HEPATITIS B VACCINATION: Primary | ICD-10-CM

## 2020-01-06 PROCEDURE — 99207 ZZC NO CHARGE NURSE ONLY: CPT

## 2020-01-06 PROCEDURE — 90471 IMMUNIZATION ADMIN: CPT

## 2020-01-06 PROCEDURE — 90746 HEPB VACCINE 3 DOSE ADULT IM: CPT

## 2020-01-06 NOTE — TELEPHONE ENCOUNTER
Pt in clinic today to get hep b series againshe states [facility] and [facility] is no record of the first set of vaccines therefore she would like to do the series again. She would like us to call and let her know if we can, she needs this completed in order to do her college classes as well as her work would like her to complete this again.   Yuliana Deluca MA

## 2020-01-06 NOTE — PROGRESS NOTES
Prior to immunization administration, verified patients identity using patient s name and date of birth. Please see Immunization Activity for additional information.     Screening Questionnaire for Adult Immunization    Are you sick today?   No   Do you have allergies to medications, food, a vaccine component or latex?   No   Have you ever had a serious reaction after receiving a vaccination?   No   Do you have a long-term health problem with heart, lung, kidney, or metabolic disease (e.g., diabetes), asthma, a blood disorder, no spleen, complement component deficiency, a cochlear implant, or a spinal fluid leak?  Are you on long-term aspirin therapy?   No   Do you have cancer, leukemia, HIV/AIDS, or any other immune system problem?   No   Do you have a parent, brother, or sister with an immune system problem?   No   In the past 3 months, have you taken medications that affect  your immune system, such as prednisone, other steroids, or anticancer drugs; drugs for the treatment of rheumatoid arthritis, Crohn s disease, or psoriasis; or have you had radiation treatments?   No   Have you had a seizure, or a brain or other nervous system problem?   No   During the past year, have you received a transfusion of blood or blood    products, or been given immune (gamma) globulin or antiviral drug?   No   For women: Are you pregnant or is there a chance you could become       pregnant during the next month?   No   Have you received any vaccinations in the past 4 weeks?   No     Immunization questionnaire answers were all negative.        Per orders of Rossi Ambrose, injection of Hepatitis B given by Toshia Arriaza CMA. Patient instructed to remain in clinic for 15 minutes afterwards, and to report any adverse reaction to me immediately.       Screening performed by Toshia Arriaza CMA on 1/6/2020 at 3:02 PM.

## 2020-01-06 NOTE — TELEPHONE ENCOUNTER
Cannot verify had two series of Hepatitis B vaccine. Recommend repeating series for total of 6 shots. Orders placed. If does not sero-convert after 6 shots, will not need more vaccines and will not covert to immune status. Pt. Aware. Orders placed. FRANCESCA Jones CNP

## 2020-02-12 ENCOUNTER — TELEPHONE (OUTPATIENT)
Dept: FAMILY MEDICINE | Facility: CLINIC | Age: 40
End: 2020-02-12

## 2020-02-12 ENCOUNTER — INFUSION THERAPY VISIT (OUTPATIENT)
Dept: INFUSION THERAPY | Facility: CLINIC | Age: 40
End: 2020-02-12
Attending: PHYSICIAN ASSISTANT
Payer: MEDICAID

## 2020-02-12 VITALS
WEIGHT: 134.2 LBS | HEART RATE: 70 BPM | RESPIRATION RATE: 18 BRPM | OXYGEN SATURATION: 100 % | BODY MASS INDEX: 21.06 KG/M2 | TEMPERATURE: 98.1 F | SYSTOLIC BLOOD PRESSURE: 111 MMHG | HEIGHT: 67 IN | DIASTOLIC BLOOD PRESSURE: 69 MMHG

## 2020-02-12 DIAGNOSIS — K50.019 CROHN'S DISEASE OF ILEUM WITH COMPLICATION (H): Primary | ICD-10-CM

## 2020-02-12 PROCEDURE — 96365 THER/PROPH/DIAG IV INF INIT: CPT

## 2020-02-12 PROCEDURE — 25800030 ZZH RX IP 258 OP 636: Performed by: FAMILY MEDICINE

## 2020-02-12 PROCEDURE — 25000128 H RX IP 250 OP 636: Performed by: FAMILY MEDICINE

## 2020-02-12 RX ADMIN — SODIUM CHLORIDE 250 ML: 9 INJECTION, SOLUTION INTRAVENOUS at 08:49

## 2020-02-12 RX ADMIN — VEDOLIZUMAB 300 MG: 300 INJECTION, POWDER, LYOPHILIZED, FOR SOLUTION INTRAVENOUS at 08:54

## 2020-02-12 ASSESSMENT — PAIN SCALES - GENERAL: PAINLEVEL: MODERATE PAIN (5)

## 2020-02-12 ASSESSMENT — MIFFLIN-ST. JEOR: SCORE: 1321.36

## 2020-02-12 NOTE — PROGRESS NOTES
Infusion Nursing Note:  Soo NOONAN Shanelle presents today for Entyvio.    Patient seen by provider today: No   present during visit today: Not Applicable.    Note: Patient denies new symptom.    Intravenous Access:  Peripheral IV placed.    Treatment Conditions:  Biological Infusion Checklist:  ~~~ NOTE: If the patient answers yes to any of the questions below, hold the infusion and contact ordering provider or on-call provider.    1. Have you recently had an elevated temperature, fever, chills, productive cough, coughing for 3 weeks or longer or hemoptysis, abnormal vital signs, night sweats,  chest pain or have you noticed a decrease in your appetite, unexplained weight loss or fatigue? No  2. Do you have any open wounds or new incisions? No  3. Do you have any recent or upcoming hospitalizations, surgeries or dental procedures? No  4. Do you currently have or recently have had any signs of illness or infection or are you on any antibiotics? No  5. Have you had any new, sudden or worsening abdominal pain? No  6. Have you or anyone in your household received a live vaccination in the past 4 weeks? Please note:  No live vaccines while on biologic/chemotherapy until 6 months after the last treatment.  Patient can receive the flu vaccine (shot only) and the pneumovax.  It is optimal for the patient to get these vaccines mid cycle, but they can be given at any time as long as it is not on the day of the infusion. No  7. Have you recently been diagnosed with any new nervous system diseases (ie. Multiple sclerosis, Guillain Aspen, seizures, neurological changes) or cancer diagnosis? No  8. Are you on any form of radiation or chemotherapy? No  9. Are you pregnant or breast feeding or do you have plans of pregnancy in the future? No  10. Have you been having any signs of worsening depression or suicidal ideations?  (benlysta only) No  11. Have there been any other new onset medical symptoms? No        Post  Infusion Assessment:  Patient tolerated infusion without incident.  Patient observed for 15 minutes post infusion per protocol.  Blood return noted pre and post infusion.  Site patent and intact, free from redness, edema or discomfort.  No evidence of extravasations.  Access discontinued per protocol.  Biologic Infusion Post Education: Call the triage nurse at your clinic or seek medical attention if you have chills and/or temperature greater than or equal to 100.5, uncontrolled nausea/vomiting, diarrhea, constipation, dizziness, shortness of breath, chest pain, heart palpitations, weakness or any other new or concerning symptoms, questions or concerns.  You cannot have any live virus vaccines prior to or during treatment or up to 6 months post infusion.  If you have an upcoming surgery, medical procedure or dental procedure during treatment, this should be discussed with your ordering physician and your surgeon/dentist.  If you are having any concerning symptom, if you are unsure if you should get your next infusion or wish to speak to a provider before your next infusion, please call your care coordinator or triage nurse at your clinic to notify them so we can adequately serve you.       Discharge Plan:   Discharge instructions reviewed with: Patient.  Patient and/or family verbalized understanding of discharge instructions and all questions answered.  Patient discharged in stable condition accompanied by: self.  Departure Mode: Ambulatory.    Jasmyn Thompson RN

## 2020-02-12 NOTE — TELEPHONE ENCOUNTER
Covering Provider:     Patient wanting zofran for tonight as she is very nauseated after her infusion today.    States she has gotten zofran before for her infusion nausea.     Spoke to patient.     States this happens occasionally with infusions.   Overwhelming nausea with infusions.   Vomiting since noon.   Infusion was over about 11am.     States she just wants zofran.   Has been prescribed this before for infusion nausea.     Doesn't think dehydrated at this point.  States she is able to keep fluids down.      Jaelyn Ramon, RN BSN

## 2020-02-13 NOTE — TELEPHONE ENCOUNTER
LM asking pt to return call. If pt has not been able to get Zofran from GI see message from KL below.    Barbi Alves, MSN, RN

## 2020-02-13 NOTE — TELEPHONE ENCOUNTER
I haven't had people with this significant a reaction. Advise she contact GI as they are managing her infusions.   Otherwise, if she's not able to see them, I should see her in clinic. FRANCESCA Jones CNP

## 2020-02-13 NOTE — TELEPHONE ENCOUNTER
Huddled with KL, who states pt should check with GI. Pt is going to work now. She left a message for GI also.     Pt asked me to leave note for KL incase she will send it in the morning.     Barbi Alves, MSN, RN

## 2020-03-02 ENCOUNTER — TRANSFERRED RECORDS (OUTPATIENT)
Dept: HEALTH INFORMATION MANAGEMENT | Facility: CLINIC | Age: 40
End: 2020-03-02

## 2020-03-05 ASSESSMENT — PATIENT HEALTH QUESTIONNAIRE - PHQ9: SUM OF ALL RESPONSES TO PHQ QUESTIONS 1-9: 14

## 2020-03-09 ENCOUNTER — MYC MEDICAL ADVICE (OUTPATIENT)
Dept: FAMILY MEDICINE | Facility: CLINIC | Age: 40
End: 2020-03-09

## 2020-03-09 NOTE — TELEPHONE ENCOUNTER
TC- I sent tomoguides message. Please reach out ot pt for contact info regarding letter- ir pt  or where should be faxed. Thanks. Trinidad Phoenix PA-C
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
## 2020-03-15 ENCOUNTER — HEALTH MAINTENANCE LETTER (OUTPATIENT)
Age: 40
End: 2020-03-15

## 2020-04-14 ENCOUNTER — MYC MEDICAL ADVICE (OUTPATIENT)
Dept: FAMILY MEDICINE | Facility: CLINIC | Age: 40
End: 2020-04-14

## 2020-05-15 NOTE — PROGRESS NOTES
"Soo Timmons is a 39 year old female who is being evaluated via a billable video visit.      The patient has been notified of following:     \"This video visit will be conducted via a call between you and your physician/provider. We have found that certain health care needs can be provided without the need for an in-person physical exam.  This service lets us provide the care you need with a video conversation.  If a prescription is necessary we can send it directly to your pharmacy.  If lab work is needed we can place an order for that and you can then stop by our lab to have the test done at a later time.    Video visits are billed at different rates depending on your insurance coverage.  Please reach out to your insurance provider with any questions.    If during the course of the call the physician/provider feels a video visit is not appropriate, you will not be charged for this service.\"    Patient has given verbal consent for Video visit? {YES-NO  Default Yes:4444::\"Yes\"}    How would you like to obtain your AVS? {AVS Preference:658919}    Patient would like the video invitation sent by: {video visit invitation:967374}    Will anyone else be joining your video visit? {:924605}  {If patient encounters technical issues they should call [phone] :074730}    Subjective     Soo Timmons is a 39 year old female who presents today via video visit for the following health issues:    HPI  {SUPERLIST (Optional):728213}  {PEDS Chronic and Acute Problems (Optional):043698}     Video Start Time: {video visit start/end time for provider to select:655686}    {additonal problems for provider to add (Optional):799717}    {HIST REVIEW/ LINKS 2 (Optional):288085}    Reviewed and updated as needed this visit by Provider         Review of Systems   {ROS COMP (Optional):958406}      Objective    There were no vitals taken for this visit.  Estimated body mass index is 21.02 kg/m  as calculated from the following:    " "Height as of 2/12/20: 1.702 m (5' 7\").    Weight as of 2/12/20: 60.9 kg (134 lb 3.2 oz).  Physical Exam     {video visit exam brief selected:399835::\"GENERAL: Healthy, alert and no distress\",\"EYES: Eyes grossly normal to inspection.  No discharge or erythema, or obvious scleral/conjunctival abnormalities.\",\"RESP: No audible wheeze, cough, or visible cyanosis.  No visible retractions or increased work of breathing.  \",\"SKIN: Visible skin clear. No significant rash, abnormal pigmentation or lesions.\",\"NEURO: Cranial nerves grossly intact.  Mentation and speech appropriate for age.\",\"PSYCH: Mentation appears normal, affect normal/bright, judgement and insight intact, normal speech and appearance well-groomed.\"}      {Diagnostic Test Results (Optional):599460::\"Diagnostic Test Results:\",\"Labs reviewed in Epic\"}        {PROVIDER CHARTING PREFERENCE:227299}      Video-Visit Details    Type of service:  Video Visit    Video End Time:{video visit start/end time for provider to select:587988}    Originating Location (pt. Location): {video visit patient location:107682::\"Home\"}    Distant Location (provider location):  [facility]     Platform used for Video Visit: {Virtual Visit Platforms:537142::\"[employer]\"}    No follow-ups on file.       {signature options:949308}      "

## 2020-05-18 ENCOUNTER — VIRTUAL VISIT (OUTPATIENT)
Dept: FAMILY MEDICINE | Facility: OTHER | Age: 40
End: 2020-05-18
Payer: COMMERCIAL

## 2020-05-18 DIAGNOSIS — F41.1 GENERALIZED ANXIETY DISORDER: Primary | ICD-10-CM

## 2020-05-18 DIAGNOSIS — F43.10 PTSD (POST-TRAUMATIC STRESS DISORDER): ICD-10-CM

## 2020-05-18 DIAGNOSIS — K50.018 CROHN'S DISEASE OF ILEUM WITH OTHER COMPLICATION (H): ICD-10-CM

## 2020-05-18 DIAGNOSIS — F32.0 MILD MAJOR DEPRESSION (H): ICD-10-CM

## 2020-05-18 PROCEDURE — 99214 OFFICE O/P EST MOD 30 MIN: CPT | Mod: 95 | Performed by: NURSE PRACTITIONER

## 2020-05-18 PROCEDURE — 96127 BRIEF EMOTIONAL/BEHAV ASSMT: CPT | Mod: 95 | Performed by: NURSE PRACTITIONER

## 2020-05-18 RX ORDER — FLUOXETINE 40 MG/1
40 CAPSULE ORAL DAILY
Qty: 90 CAPSULE | Refills: 0 | Status: SHIPPED | OUTPATIENT
Start: 2020-05-18 | End: 2020-09-02

## 2020-05-18 RX ORDER — HYDROXYZINE HYDROCHLORIDE 25 MG/1
12.5-25 TABLET, FILM COATED ORAL EVERY 8 HOURS PRN
Qty: 30 TABLET | Refills: 0 | Status: SHIPPED | OUTPATIENT
Start: 2020-05-18

## 2020-05-18 ASSESSMENT — ANXIETY QUESTIONNAIRES
7. FEELING AFRAID AS IF SOMETHING AWFUL MIGHT HAPPEN: SEVERAL DAYS
2. NOT BEING ABLE TO STOP OR CONTROL WORRYING: NEARLY EVERY DAY
5. BEING SO RESTLESS THAT IT IS HARD TO SIT STILL: NEARLY EVERY DAY
GAD7 TOTAL SCORE: 18
IF YOU CHECKED OFF ANY PROBLEMS ON THIS QUESTIONNAIRE, HOW DIFFICULT HAVE THESE PROBLEMS MADE IT FOR YOU TO DO YOUR WORK, TAKE CARE OF THINGS AT HOME, OR GET ALONG WITH OTHER PEOPLE: VERY DIFFICULT
6. BECOMING EASILY ANNOYED OR IRRITABLE: NEARLY EVERY DAY
3. WORRYING TOO MUCH ABOUT DIFFERENT THINGS: MORE THAN HALF THE DAYS
1. FEELING NERVOUS, ANXIOUS, OR ON EDGE: NEARLY EVERY DAY

## 2020-05-18 ASSESSMENT — PATIENT HEALTH QUESTIONNAIRE - PHQ9
SUM OF ALL RESPONSES TO PHQ QUESTIONS 1-9: 19
5. POOR APPETITE OR OVEREATING: NEARLY EVERY DAY

## 2020-05-18 NOTE — PROGRESS NOTES
"Soo Timmons is a 39 year old female who is being evaluated via a billable telephone visit.      The patient has been notified of following:     \"This telephone visit will be conducted via a call between you and your physician/provider. We have found that certain health care needs can be provided without the need for a physical exam.  This service lets us provide the care you need with a short phone conversation.  If a prescription is necessary we can send it directly to your pharmacy.  If lab work is needed we can place an order for that and you can then stop by our lab to have the test done at a later time.    Telephone visits are billed at different rates depending on your insurance coverage. During this emergency period, for some insurers they may be billed the same as an in-person visit.  Please reach out to your insurance provider with any questions.    If during the course of the call the physician/provider feels a telephone visit is not appropriate, you will not be charged for this service.\"    Patient has given verbal consent for Telephone visit?  Yes    What phone number would you like to be contacted at? [phone]    How would you like to obtain your AVS? Lexa Childress     Soo Timmons is a 39 year old female who presents via phone visit today for the following health issues:    HPI   Patient is looking for a letter to state that she has Crohn's Disease. She states her ex- and her are going through a divorce and he and his  is looking for information that would state that she has Crohn's. Her ex- is saying that she does not have Crohn's and that she is able to work but does not want to.     Is not wanting disability but would like a letter to support her medical conditions and the FMLA and schedule she has.    Is taking Prozac, just not on for the past 2 days.   Is not in counseling.   Work is going well.   Divorce is stressful.   Daughter is refusing to see her " dad.     Mood- higher anxiety.                Patient Active Problem List   Diagnosis     Migraine with aura     Fatigue     Surveillance of previously prescribed intrauterine contraceptive device     Anemia     CARDIOVASCULAR SCREENING; LDL GOAL LESS THAN 160     PTSD (post-traumatic stress disorder)     Health Care Home     Generalized anxiety disorder     Colitis     GERD (gastroesophageal reflux disease)     Crohn's ileitis (H)     Malabsorption     Inflammatory bowel disease (Crohn's disease) (H)     Iron deficiency anemia     Vitamin B 12 deficiency     Irritable bowel syndrome     Mild major depression (H)     Past Surgical History:   Procedure Laterality Date     COLONOSCOPY  10/04/06    [facility]      COLONOSCOPY  12/20/2013    Procedure: COMBINED COLONOSCOPY, SINGLE BIOPSY/POLYPECTOMY BY BIOPSY;;  Surgeon: Presley Ocampo MD;  Location: [facility] OR     HC REMOVE INTRAUTERINE DEVICE  09/02/08     HC UGI ENDOSCOPY, SIMPLE EXAM  10/4/2006     LAPAROSCOPIC CHOLECYSTECTOMY  5/16/2011    Procedure:LAPAROSCOPIC CHOLECYSTECTOMY; Surgeon:LIYAH AVELAR; Location:[facility] OR     SMALL BOWEL RESECTION  01/10/2018       Social History     Tobacco Use     Smoking status: Never Smoker     Smokeless tobacco: Never Used   Substance Use Topics     Alcohol use: No     Alcohol/week: 0.0 standard drinks     Family History   Problem Relation Age of Onset     Hypertension Mother      Alzheimer Disease Maternal Grandmother      Asthma No family hx of      C.A.D. No family hx of      Diabetes No family hx of      Cerebrovascular Disease No family hx of      Breast Cancer No family hx of      Cancer - colorectal No family hx of      Prostate Cancer No family hx of      Alcohol/Drug No family hx of      Allergies No family hx of      Anesthesia Reaction No family hx of      Arthritis No family hx of      Blood Disease No family hx of      Cancer No family hx of      Circulatory No family hx of      Depression No family hx of       Endocrine Disease No family hx of      Eye Disorder No family hx of      Genetic Disorder No family hx of      Gynecology No family hx of      Gastrointestinal Disease No family hx of      Genitourinary Problems No family hx of      Heart Disease No family hx of      Lipids No family hx of      Neurologic Disorder No family hx of      Obesity No family hx of      Hearing Loss No family hx of      Respiratory No family hx of      Osteoporosis No family hx of      Musculoskeletal Disorder No family hx of      Thyroid Disease No family hx of      Psychotic Disorder No family hx of      Cardiovascular No family hx of      Congenital Anomalies No family hx of      Connective Tissue Disorder No family hx of      Coronary Artery Disease No family hx of      Hyperlipidemia No family hx of      Ovarian Cancer No family hx of      Depression/Anxiety No family hx of      Thyroid Disease No family hx of      Chemical Addiction No family hx of      Known Genetic Syndrome No family hx of      Anxiety Disorder No family hx of      Mental Illness No family hx of      Substance Abuse No family hx of      Colon Cancer No family hx of      Other Cancer No family hx of      Glaucoma No family hx of      Macular Degeneration No family hx of            Reviewed and updated as needed this visit by Provider         Review of Systems   Constitutional, HEENT, cardiovascular, pulmonary, gi and gu systems are negative, except as otherwise noted.       Objective   Reported vitals:  There were no vitals taken for this visit.   healthy, alert and no distress  PSYCH: Alert and oriented times 3; coherent speech, normal   rate and volume, able to articulate logical thoughts, able   to abstract reason, no tangential thoughts, no hallucinations   or delusions  Her affect is anxious  RESP: No cough, no audible wheezing, able to talk in full sentences  Remainder of exam unable to be completed due to telephone visits    Diagnostic Test Results:  none          Assessment/Plan:  1. Generalized anxiety disorder  counseling advised.   Letter completed. Increased Prozac.  Atarax for sleep, avoidance of benzo. If able.   Stress reduction.     - hydrOXYzine (ATARAX) 25 MG tablet; Take 0.5-1 tablets (12.5-25 mg) by mouth every 8 hours as needed for anxiety  Dispense: 30 tablet; Refill: 0  - FLUoxetine (PROZAC) 40 MG capsule; Take 1 capsule (40 mg) by mouth daily  Dispense: 90 capsule; Refill: 0    2. Crohn's disease of ileum with other complication (H)  Stable currently no flares, weight is stable. She is increasing her probiotics. She'll call [facility] about coverage and timing of infusions during Covid.     3. Mild major depression (H)  As above    4. PTSD (post-traumatic stress disorder)  As above      Return in about 2 months (around 7/18/2020) for re-check office visit.      Phone call duration:  23 minutes    FRANCESCA Jones CNP

## 2020-05-18 NOTE — LETTER
May 18, 2020      Soo Timmons  [address]  [place] [address]        To Whom It May Concern,        Soo has a diagnosis of Crohn's Colitis, Generalized anxiety disorder and post-traumatic stress disorder. Soo has had multiple treatments, chemo/infusion therapy and a small bowel resection for her Crohn's in addition to medication and therapy for her mental health issues.     It is understandable that she has needed quite a bit of FMLA and that emotional strain and stress, such as marital issues and a divorce, can put great strain on these medical conditions.     She has remained most stable with her current work load.       Sincerely,        FRANCESCA Jones CNP

## 2020-05-19 ASSESSMENT — ANXIETY QUESTIONNAIRES: GAD7 TOTAL SCORE: 18

## 2020-07-07 ENCOUNTER — E-VISIT (OUTPATIENT)
Dept: FAMILY MEDICINE | Facility: OTHER | Age: 40
End: 2020-07-07
Payer: COMMERCIAL

## 2020-07-07 DIAGNOSIS — F33.1 MAJOR DEPRESSIVE DISORDER, RECURRENT EPISODE, MODERATE (H): Primary | ICD-10-CM

## 2020-07-07 PROCEDURE — 99421 OL DIG E/M SVC 5-10 MIN: CPT | Performed by: NURSE PRACTITIONER

## 2020-07-07 ASSESSMENT — ANXIETY QUESTIONNAIRES
7. FEELING AFRAID AS IF SOMETHING AWFUL MIGHT HAPPEN: SEVERAL DAYS
3. WORRYING TOO MUCH ABOUT DIFFERENT THINGS: SEVERAL DAYS
5. BEING SO RESTLESS THAT IT IS HARD TO SIT STILL: SEVERAL DAYS
7. FEELING AFRAID AS IF SOMETHING AWFUL MIGHT HAPPEN: SEVERAL DAYS
1. FEELING NERVOUS, ANXIOUS, OR ON EDGE: SEVERAL DAYS
GAD7 TOTAL SCORE: 7
4. TROUBLE RELAXING: SEVERAL DAYS
GAD7 TOTAL SCORE: 7
GAD7 TOTAL SCORE: 7
6. BECOMING EASILY ANNOYED OR IRRITABLE: SEVERAL DAYS
2. NOT BEING ABLE TO STOP OR CONTROL WORRYING: SEVERAL DAYS

## 2020-07-07 ASSESSMENT — PATIENT HEALTH QUESTIONNAIRE - PHQ9
10. IF YOU CHECKED OFF ANY PROBLEMS, HOW DIFFICULT HAVE THESE PROBLEMS MADE IT FOR YOU TO DO YOUR WORK, TAKE CARE OF THINGS AT HOME, OR GET ALONG WITH OTHER PEOPLE: EXTREMELY DIFFICULT
SUM OF ALL RESPONSES TO PHQ QUESTIONS 1-9: 11
SUM OF ALL RESPONSES TO PHQ QUESTIONS 1-9: 11

## 2020-07-07 NOTE — LETTER
July 8, 2020      Soo Timmons  [address]  [place] [address]        To Whom It May Concern,      Soo has medical stressors, and needs some time off from school.         Sincerely,        FRANCESCA Jones CNP

## 2020-07-08 ENCOUNTER — TELEPHONE (OUTPATIENT)
Dept: FAMILY MEDICINE | Facility: CLINIC | Age: 40
End: 2020-07-08

## 2020-07-08 DIAGNOSIS — F33.1 MAJOR DEPRESSIVE DISORDER, RECURRENT EPISODE, MODERATE (H): Primary | ICD-10-CM

## 2020-07-08 ASSESSMENT — ANXIETY QUESTIONNAIRES: GAD7 TOTAL SCORE: 7

## 2020-07-08 ASSESSMENT — PATIENT HEALTH QUESTIONNAIRE - PHQ9: SUM OF ALL RESPONSES TO PHQ QUESTIONS 1-9: 11

## 2020-07-08 NOTE — TELEPHONE ENCOUNTER
Provider E-Visit time total (minutes): 13    Pt. was not taking meds. Will  RX- has supply at home?  Discussed needing more services.   Counseling advised- will order.   Able to give letter stating needing time off of school, and can finish grades, but not able to give ongoign time off.     Re-check in 2 weeks.     FRANCESCA Jones CNP

## 2020-07-08 NOTE — PATIENT INSTRUCTIONS
Depression: Tips to Help Yourself    As your healthcare providers help treat your depression, you can also help yourself. Keep in mind that your illness affects you emotionally, physically, mentally, and socially. So full recovery will take time. Take care of your body and your soul, and be patient with yourself as you get better.  Self-care    Educate yourself. Read about treatment and medicine options. If you have the energy, attend local conferences or support groups. Keep a list of useful websites and helpful books and use them as needed. This illness is not your fault. Don t blame yourself for your depression.    Manage early symptoms. If you notice symptoms returning, experience triggers, or identify other factors that may lead to a depressive episode, get help as soon as possible. Ask trusted friends and family to monitor your behavior and let you know if they see anything of concern.    Work with your provider. Find a provider you can trust. Communicate honestly with that person and share information on your treatment for depression and your reaction to medicines.    Be prepared for a crisis. Know what to do if you experience a crisis. Keep the phone number of a crisis hotline and know the location of your community's urgent care centers and the closest emergency department.    Hold off on big decisions. Depression can cloud your judgment. So wait until you feel better before making major life decisions, such as changing jobs, moving, or getting  or .    Be patient. Recovering from depression is a process. Don t be discouraged if it takes some time to feel better.    Keep it simple. Depression saps your energy and concentration. So you won t be able to do all the things you used to do. Set small goals and do what you can.    Be with others. Don t isolate yourself--you ll only feel worse. Try to be with other people. And take part in fun activities when you can. Go to a movie, ballgame,  Taoism service, or social event. Talk openly with people you can trust. And accept help when it s offered.  Take care of your body  People with depression often lose the desire to take care of themselves. That only makes their problems worse. During treatment and afterward, make a point to:    Exercise. It s a great way to take care of your body. And studies have shown that exercise helps fight depression.    Avoid drugs and alcohol. These may ease the pain in the short term. But they ll only make your problems worse in the long run.    Get relief from stress. Ask your healthcare provider for relaxation exercises and techniques to help relieve stress.    Eat right. A balanced and healthy diet helps keep your body healthy.  Date Last Reviewed: 1/1/2017 2000-2019 [employer]. [address] [place] [address], [place] [address]. All rights reserved. This information is not intended as a substitute for professional medical care. Always follow your healthcare professional's instructions.          Depression Affects Your Mind and Body  Everyone feels sad or  blue  from time to time for a few days or weeks. Depression is when these feelings don't go away and they interfere with daily life.  Depression is a real illness that can develop at any age. It is one of the most common mental health problems in the [place] Depression makes you feel sad, helpless, and hopeless. It gets in the way of your life and relationships. It inhibits your ability to think and act. But, with help, you can feel better again.      When I was depressed, I felt awful. I was so tired all the time I could hardly think, but at night I couldn t fall asleep. My head hurt. My stomach hurt. I didn t know what was wrong with me.    Depression affects your whole body  Brain chemicals affect your body as well as your mood. So depression may do more than just make you feel low. You may also feel bad physically. Depression can:    Cause trouble with  mental tasks such as remembering, concentrating, or making decisions    Make you feel nervous and jumpy    Cause trouble sleeping. Or you may sleep too much    Change your appetite    Cause headaches, stomachaches, or other aches and pains    Drain your body of energy  Depression and other illness  It is common for people who have chronic health problems to also have depression. It can often be hard to tell which one caused the other. A person might become depressed after finding out they have a health problem. But some studies suggest being depressed may make certain health problems more likely. And some depressed people stop taking care of themselves. This may make them more likely to get sick.  Date Last Reviewed: 1/1/2017 2000-2019 [employer]. [address] [place] [address], [place] [address]. All rights reserved. This information is not intended as a substitute for professional medical care. Always follow your healthcare professional's instructions.        Please schedule follow-up in 2-3 weeks. Please schedule follow-up with counseling as well.
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
## 2020-07-29 ENCOUNTER — OFFICE VISIT (OUTPATIENT)
Dept: FAMILY MEDICINE | Facility: OTHER | Age: 40
End: 2020-07-29
Payer: COMMERCIAL

## 2020-07-29 VITALS
DIASTOLIC BLOOD PRESSURE: 62 MMHG | SYSTOLIC BLOOD PRESSURE: 104 MMHG | HEART RATE: 61 BPM | BODY MASS INDEX: 21.44 KG/M2 | TEMPERATURE: 97.9 F | HEIGHT: 67 IN | WEIGHT: 136.6 LBS | RESPIRATION RATE: 16 BRPM | OXYGEN SATURATION: 100 %

## 2020-07-29 DIAGNOSIS — Z12.4 SCREENING FOR MALIGNANT NEOPLASM OF CERVIX: ICD-10-CM

## 2020-07-29 DIAGNOSIS — Z11.3 SCREEN FOR STD (SEXUALLY TRANSMITTED DISEASE): ICD-10-CM

## 2020-07-29 DIAGNOSIS — Z00.00 ROUTINE GENERAL MEDICAL EXAMINATION AT A HEALTH CARE FACILITY: Primary | ICD-10-CM

## 2020-07-29 DIAGNOSIS — L70.9 ACNE, UNSPECIFIED ACNE TYPE: ICD-10-CM

## 2020-07-29 DIAGNOSIS — T83.32XA INTRAUTERINE DEVICE (IUD) MIGRATION, INITIAL ENCOUNTER: ICD-10-CM

## 2020-07-29 DIAGNOSIS — Z11.4 SCREENING FOR HIV (HUMAN IMMUNODEFICIENCY VIRUS): ICD-10-CM

## 2020-07-29 DIAGNOSIS — R87.619 ABNORMAL CERVICAL PAPANICOLAOU SMEAR, UNSPECIFIED ABNORMAL PAP FINDING: ICD-10-CM

## 2020-07-29 DIAGNOSIS — Z30.432 ENCOUNTER FOR REMOVAL OF INTRAUTERINE CONTRACEPTIVE DEVICE (IUD): ICD-10-CM

## 2020-07-29 LAB
BASOPHILS # BLD AUTO: 0 10E9/L (ref 0–0.2)
BASOPHILS NFR BLD AUTO: 0.1 %
CHOLEST SERPL-MCNC: 146 MG/DL
DIFFERENTIAL METHOD BLD: NORMAL
EOSINOPHIL # BLD AUTO: 0.3 10E9/L (ref 0–0.7)
EOSINOPHIL NFR BLD AUTO: 4.2 %
ERYTHROCYTE [DISTWIDTH] IN BLOOD BY AUTOMATED COUNT: 13.3 % (ref 10–15)
GLUCOSE SERPL-MCNC: 81 MG/DL (ref 70–99)
HCT VFR BLD AUTO: 42.8 % (ref 35–47)
HDLC SERPL-MCNC: 49 MG/DL
HGB BLD-MCNC: 14.1 G/DL (ref 11.7–15.7)
LDLC SERPL CALC-MCNC: 86 MG/DL
LYMPHOCYTES # BLD AUTO: 1.9 10E9/L (ref 0.8–5.3)
LYMPHOCYTES NFR BLD AUTO: 27.7 %
MCH RBC QN AUTO: 30.2 PG (ref 26.5–33)
MCHC RBC AUTO-ENTMCNC: 32.9 G/DL (ref 31.5–36.5)
MCV RBC AUTO: 92 FL (ref 78–100)
MONOCYTES # BLD AUTO: 0.5 10E9/L (ref 0–1.3)
MONOCYTES NFR BLD AUTO: 7.6 %
NEUTROPHILS # BLD AUTO: 4.1 10E9/L (ref 1.6–8.3)
NEUTROPHILS NFR BLD AUTO: 60.4 %
NONHDLC SERPL-MCNC: 97 MG/DL
PLATELET # BLD AUTO: 220 10E9/L (ref 150–450)
RBC # BLD AUTO: 4.67 10E12/L (ref 3.8–5.2)
TRIGL SERPL-MCNC: 56 MG/DL
TSH SERPL DL<=0.005 MIU/L-ACNC: 2.32 MU/L (ref 0.4–4)
WBC # BLD AUTO: 6.7 10E9/L (ref 4–11)

## 2020-07-29 PROCEDURE — 99214 OFFICE O/P EST MOD 30 MIN: CPT | Mod: 25 | Performed by: NURSE PRACTITIONER

## 2020-07-29 PROCEDURE — 90732 PPSV23 VACC 2 YRS+ SUBQ/IM: CPT | Performed by: NURSE PRACTITIONER

## 2020-07-29 PROCEDURE — 87591 N.GONORRHOEAE DNA AMP PROB: CPT | Performed by: NURSE PRACTITIONER

## 2020-07-29 PROCEDURE — 36415 COLL VENOUS BLD VENIPUNCTURE: CPT | Performed by: NURSE PRACTITIONER

## 2020-07-29 PROCEDURE — 87491 CHLMYD TRACH DNA AMP PROBE: CPT | Performed by: NURSE PRACTITIONER

## 2020-07-29 PROCEDURE — 99395 PREV VISIT EST AGE 18-39: CPT | Mod: 25 | Performed by: NURSE PRACTITIONER

## 2020-07-29 PROCEDURE — 85025 COMPLETE CBC W/AUTO DIFF WBC: CPT | Performed by: NURSE PRACTITIONER

## 2020-07-29 PROCEDURE — 80061 LIPID PANEL: CPT | Performed by: NURSE PRACTITIONER

## 2020-07-29 PROCEDURE — 82947 ASSAY GLUCOSE BLOOD QUANT: CPT | Performed by: NURSE PRACTITIONER

## 2020-07-29 PROCEDURE — 90471 IMMUNIZATION ADMIN: CPT | Performed by: NURSE PRACTITIONER

## 2020-07-29 PROCEDURE — 88175 CYTOPATH C/V AUTO FLUID REDO: CPT | Performed by: NURSE PRACTITIONER

## 2020-07-29 PROCEDURE — G0476 HPV COMBO ASSAY CA SCREEN: HCPCS | Performed by: NURSE PRACTITIONER

## 2020-07-29 PROCEDURE — 87389 HIV-1 AG W/HIV-1&-2 AB AG IA: CPT | Performed by: NURSE PRACTITIONER

## 2020-07-29 PROCEDURE — 84443 ASSAY THYROID STIM HORMONE: CPT | Performed by: NURSE PRACTITIONER

## 2020-07-29 RX ORDER — CLINDAMYCIN PHOSPHATE 11.9 MG/ML
SOLUTION TOPICAL 2 TIMES DAILY
Qty: 60 ML | Refills: 0 | Status: SHIPPED | OUTPATIENT
Start: 2020-07-29

## 2020-07-29 ASSESSMENT — MIFFLIN-ST. JEOR: SCORE: 1319.3

## 2020-07-30 LAB
C TRACH DNA SPEC QL NAA+PROBE: NEGATIVE
HIV 1+2 AB+HIV1 P24 AG SERPL QL IA: NONREACTIVE
N GONORRHOEA DNA SPEC QL NAA+PROBE: NEGATIVE
SPECIMEN SOURCE: NORMAL
SPECIMEN SOURCE: NORMAL

## 2020-07-31 LAB
COPATH REPORT: NORMAL
PAP: NORMAL

## 2020-08-05 LAB
FINAL DIAGNOSIS: NORMAL
HPV HR 12 DNA CVX QL NAA+PROBE: NEGATIVE
HPV16 DNA SPEC QL NAA+PROBE: NEGATIVE
HPV18 DNA SPEC QL NAA+PROBE: NEGATIVE
SPECIMEN DESCRIPTION: NORMAL
SPECIMEN SOURCE CVX/VAG CYTO: NORMAL

## 2020-08-06 ENCOUNTER — HOSPITAL ENCOUNTER (OUTPATIENT)
Dept: ULTRASOUND IMAGING | Facility: CLINIC | Age: 40
Discharge: HOME | End: 2020-08-06
Attending: NURSE PRACTITIONER | Admitting: NURSE PRACTITIONER
Payer: COMMERCIAL

## 2020-08-06 ENCOUNTER — TELEPHONE (OUTPATIENT)
Dept: FAMILY MEDICINE | Facility: CLINIC | Age: 40
End: 2020-08-06

## 2020-08-06 DIAGNOSIS — T83.9XXA COMPLICATION OF INTRAUTERINE DEVICE (IUD), UNSPECIFIED COMPLICATION, INITIAL ENCOUNTER (H): Primary | ICD-10-CM

## 2020-08-06 DIAGNOSIS — T83.32XA INTRAUTERINE DEVICE (IUD) MIGRATION, INITIAL ENCOUNTER: ICD-10-CM

## 2020-08-06 PROCEDURE — 76830 TRANSVAGINAL US NON-OB: CPT

## 2020-09-02 DIAGNOSIS — F41.1 GENERALIZED ANXIETY DISORDER: ICD-10-CM

## 2020-09-02 RX ORDER — FLUOXETINE 40 MG/1
40 CAPSULE ORAL DAILY
Qty: 90 CAPSULE | Refills: 0 | Status: SHIPPED | OUTPATIENT
Start: 2020-09-02 | End: 2020-12-11

## 2020-09-02 NOTE — TELEPHONE ENCOUNTER
Prescription approved per [facility] Refill Protocol.  Dorothy given. Due for visit.

## 2020-09-14 NOTE — PROGRESS NOTES
"Soo Timmons is a 40 year old female who is being evaluated via a billable telephone visit.      The patient has been notified of following:     \"This telephone visit will be conducted via a call between you and your physician/provider. We have found that certain health care needs can be provided without the need for a physical exam.  This service lets us provide the care you need with a short phone conversation.  If a prescription is necessary we can send it directly to your pharmacy.  If lab work is needed we can place an order for that and you can then stop by our lab to have the test done at a later time.    Telephone visits are billed at different rates depending on your insurance coverage. During this emergency period, for some insurers they may be billed the same as an in-person visit.  Please reach out to your insurance provider with any questions.    If during the course of the call the physician/provider feels a telephone visit is not appropriate, you will not be charged for this service.\"    Patient has given verbal consent for Telephone visit?  Yes    What phone number would you like to be contacted at? [phone]    How would you like to obtain your AVS? Lexa    Subjective     oSo Timmons is a 40 year old female who presents via phone visit today for the following health issues:    HPI    Depression and Anxiety Follow-Up    How are you doing with your depression since your last visit? No change    How are you doing with your anxiety since your last visit?  No change    Are you having other symptoms that might be associated with depression or anxiety? Yes:  hard to concentrate on things, no intrest in doing things    Have you had a significant life event? OTHER: Moving     Do you have any concerns with your use of alcohol or other drugs? No    Social History     Tobacco Use     Smoking status: Never Smoker     Smokeless tobacco: Never Used   Substance Use Topics     Alcohol use: No     " Alcohol/week: 0.0 standard drinks     Drug use: No     PHQ 5/18/2020 7/7/2020 9/21/2020   PHQ-9 Total Score 19 11 5   Q9: Thoughts of better off dead/self-harm past 2 weeks Not at all Not at all Not at all   F/U: Thoughts of suicide or self-harm - - -   F/U: Self harm-plan - - -   F/U: Self-harm action - - -   F/U: Safety concerns - - -     JUJU-7 SCORE 5/18/2020 7/7/2020 9/21/2020   Total Score - - -   Total Score - 7 (mild anxiety) 6 (mild anxiety)   Total Score 18 7 6     Last PHQ-9 9/21/2020   1.  Little interest or pleasure in doing things 1   2.  Feeling down, depressed, or hopeless 0   3.  Trouble falling or staying asleep, or sleeping too much 1   4.  Feeling tired or having little energy 1   5.  Poor appetite or overeating 1   6.  Feeling bad about yourself 0   7.  Trouble concentrating 1   8.  Moving slowly or restless 0   Q9: Thoughts of better off dead/self-harm past 2 weeks 0   PHQ-9 Total Score 5   Difficulty at work, home, or with people -   In the past two weeks have you had thoughts of suicide or self harm? -   Do you have concerns about your personal safety or the safety of others? -   In the past 2 weeks have you thought about a plan or had intention to harm yourself? -   In the past 2 weeks have you acted on these thoughts in any way? -     JUJU-7  9/21/2020   1. Feeling nervous, anxious, or on edge 1   2. Not being able to stop or control worrying 1   3. Worrying too much about different things 1   4. Trouble relaxing 1   5. Being so restless that it is hard to sit still 1   6. Becoming easily annoyed or irritable 0   7. Feeling afraid, as if something awful might happen 1   JUJU-7 Total Score 6   If you checked any problems, how difficult have they made it for you to do your work, take care of things at home, or get along with other people? -       Suicide Assessment Five-step Evaluation and Treatment (SAFE-T)      How many servings of fruits and vegetables do you eat daily?  0-1    On average,  "how many sweetened beverages do you drink each day (Examples: soda, juice, sweet tea, etc.  Do NOT count diet or artificially sweetened beverages)?   1-2 cups of coffee with sugar     How many days per week do you exercise enough to make your heart beat faster? 3 or less    How many minutes a day do you exercise enough to make your heart beat faster? 9 or less    How many days per week do you miss taking your medication? 0         Has Crohn's , has upcoming infusion next week.   Moving in with her mom. Feels mood is stable, High life stress with home-schooling and moving in with her mom who has mental health issues as well.   Divorce is not yet final, but hoping for this soon as  has \"all the paperwork, is in\".   Is open to counseling if she had more time.  She is currently working nights.      She is requesting labs to be done from her gastroenterologist.  Note is reviewed and labs are ordered.  She is going back on Skyrizi, and needs routine labs prior to this.  Flu shot has not yet been completed.    Review of Systems   Constitutional, HEENT, cardiovascular, pulmonary, gi and gu systems are negative, except as otherwise noted.       Objective          Vitals:  No vitals were obtained today due to virtual visit.    healthy, alert and no distress  PSYCH: Alert and oriented times 3; coherent speech, normal   rate and volume, able to articulate logical thoughts, able   to abstract reason, no tangential thoughts, no hallucinations   or delusions  Her affect is normal and pleasant  RESP: No cough, no audible wheezing, able to talk in full sentences  Remainder of exam unable to be completed due to telephone visits    Office Visit on 07/29/2020   Component Date Value Ref Range Status     HIV Antigen Antibody Combo 07/29/2020 Nonreactive  NR^Nonreactive     Final    HIV-1 p24 Ag & HIV-1/HIV-2 Ab Not Detected     TSH 07/29/2020 2.32  0.40 - 4.00 mU/L Final     Glucose 07/29/2020 81  70 - 99 mg/dL Final     Cholesterol " 07/29/2020 146  <200 mg/dL Final     Triglycerides 07/29/2020 56  <150 mg/dL Final     HDL Cholesterol 07/29/2020 49* >49 mg/dL Final     LDL Cholesterol Calculated 07/29/2020 86  <100 mg/dL Final    Desirable:       <100 mg/dl     Non HDL Cholesterol 07/29/2020 97  <130 mg/dL Final     WBC 07/29/2020 6.7  4.0 - 11.0 10e9/L Final     RBC Count 07/29/2020 4.67  3.8 - 5.2 10e12/L Final     Hemoglobin 07/29/2020 14.1  11.7 - 15.7 g/dL Final     Hematocrit 07/29/2020 42.8  35.0 - 47.0 % Final     MCV 07/29/2020 92  78 - 100 fl Final     MCH 07/29/2020 30.2  26.5 - 33.0 pg Final     MCHC 07/29/2020 32.9  31.5 - 36.5 g/dL Final     RDW 07/29/2020 13.3  10.0 - 15.0 % Final     Platelet Count 07/29/2020 220  150 - 450 10e9/L Final     % Neutrophils 07/29/2020 60.4  % Final     % Lymphocytes 07/29/2020 27.7  % Final     % Monocytes 07/29/2020 7.6  % Final     % Eosinophils 07/29/2020 4.2  % Final     % Basophils 07/29/2020 0.1  % Final     Absolute Neutrophil 07/29/2020 4.1  1.6 - 8.3 10e9/L Final     Absolute Lymphocytes 07/29/2020 1.9  0.8 - 5.3 10e9/L Final     Absolute Monocytes 07/29/2020 0.5  0.0 - 1.3 10e9/L Final     Absolute Eosinophils 07/29/2020 0.3  0.0 - 0.7 10e9/L Final     Absolute Basophils 07/29/2020 0.0  0.0 - 0.2 10e9/L Final     Diff Method 07/29/2020 Automated Method   Final     PAP 07/29/2020 NIL   Final     Copath Report 07/29/2020    Final                    Value:  Patient Name: MICHAEL SERRATO  MR#: [id number]  Specimen #: [id number]  Collected: 7/29/2020  Received: 7/30/2020  Reported: 7/31/2020 13:58  Ordering Phy(s): JOCELYN SUBRAMANIAN    For improved result formatting, select 'View Enhanced Report Format' under   Linked Documents section.    SPECIMEN/STAIN PROCESS:  Pap Imaged thin layer prep diagnostic (SurePath, FocalPoint with guided   screening)       Pap-Cyto x 1, HPV ordered x 1    SOURCE: Cervical, endocervical  ----------------------------------------------------------------   Pap Imaged  thin layer prep diagnostic (SurePath, FocalPoint with guided   screening)  SPECIMEN ADEQUACY:  Satisfactory for evaluation.  -Transformation zone component present.    CYTOLOGIC INTERPRETATION:    Negative for intraepithelial lesion or malignancy    Electronically signed out by:  DWIGHT Childs (ASCP)    CLINICAL HISTORY:    Intra-Uterine Device: mirena, Previous ASC-US: 1 year ago  Date of Last Pap: 4/4/14,    Papanicolaou Test Limitations:  Cervical                           cytology is a screening test with   limited sensitivity; regular  screening is critical for cancer prevention; Pap tests are primarily   effective for the diagnosis/prevention of  squamous cell carcinoma, not adenocarcinomas or other cancers.    COLLECTION SITE:  Client:  [facility]  Location: [facility] (P)    The technical component of this testing was completed at the [facility], with the professional component performed   at the [facility], [address] [place] [phone] ([phone])         HPV Source 07/29/2020 SurePath   Final     HPV 16 DNA 07/29/2020 Negative  NEG^Negative Final     HPV 18 DNA 07/29/2020 Negative  NEG^Negative Final     Other HR HPV 07/29/2020 Negative  NEG^Negative Final     Final Diagnosis 07/29/2020 This patient's sample is negative for HPV DNA.   Final    Comment: This test was developed and its performance characteristics determined by the   [facility]. It   has not been cleared or approved by the FDA. The laboratory is regulated under   CLIA as qualified to perform high-complexity testing. This test is used for   clinical purposes. It should not be regarded as investigational or for   research.  (Note)  METHODOLOGY:  The Roche bello 4800 system uses automated extraction,   simultaneous amplification of HPV  (L1 region) and beta-globin,    followed by  real time detection of fluorescent labeled HPV and beta   globin using specific oligonucleotide probes . The test specifically   identifies types HPV 16 DNA and HPV 18 DNA while concurrently   detecting the rest of the high risk types (31, 33, 35, 39, 45, 51,   52, 56, 58, 59, 66 or 68).  COMMENTS:  This test is not intended for use as a screening device   for women under age 30 with normal cervical cytology.  Results should   be correl                           ated with cytologic and histologic findings. Close clinical   followup is recommended.       Specimen Description 07/29/2020 Cervical Cells   Final    [id number]     Specimen Description 07/29/2020 Cervix   Final     Chlamydia Trachomatis PCR 07/29/2020 Negative  NEG^Negative Final    Comment: Negative for C. trachomatis rRNA by transcription mediated amplification.  A negative result by transcription mediated amplification does not preclude   the presence of C. trachomatis infection because results are dependent on   proper and adequate collection, absence of inhibitors, and sufficient rRNA to   be detected.       Specimen Descrip 07/29/2020 Cervix   Final     N Gonorrhea PCR 07/29/2020 Negative  NEG^Negative Final    Comment: Negative for N. gonorrhoeae rRNA by transcription mediated amplification.  A negative result by transcription mediated amplification does not preclude   the presence of N. gonorrhoeae infection because results are dependent on   proper and adequate collection, absence of inhibitors, and sufficient rRNA to   be detected.               Assessment/Plan:    Assessment & Plan     Soo was seen today for recheck medication.    Diagnoses and all orders for this visit:    Generalized anxiety disorder  -     FLUoxetine HCl, PMDD, 20 MG TABS; Take 40 mg by mouth daily    Moderate major depression (H)  -     FLUoxetine HCl, PMDD, 20 MG TABS; Take 40 mg by mouth daily    Crohn's disease of small  intestine with complication (H)  -     CRP, inflammation; Future  -     CBC with platelets and differential; Future  -     Comprehensive metabolic panel; Future  -     Quantiferon TB Gold Plus; Future  -     Vitamin B12; Future  -     Vitamin D Deficiency; Future           MEDICATIONS:  Continue current medications without change  Medications are refilled discussed her inability to swallow capsules due to GI upset.  This may also be complicated from her Crohn's.  Medication was reordered and prior auth will be sent to her plan as necessary.  Consideration of counseling.  Physical is up-to-date and completed.    Discussed Healthy eating.      Flu shot recommended.  Patient scheduled for labs and flu shot for this week.    Return in about 6 months (around 3/21/2021) for re-check office visit.    FRANCESCA Jones [facility]    Phone call duration:  12.5 minutes              Answers for HPI/ROS submitted by the patient on 9/21/2020   If you checked off any problems, how difficult have these problems made it for you to do your work, take care of things at home, or get along with other people?: Somewhat difficult  PHQ9 TOTAL SCORE: 5  JUJU 7 TOTAL SCORE: 6

## 2020-09-15 ENCOUNTER — MEDICAL CORRESPONDENCE (OUTPATIENT)
Dept: HEALTH INFORMATION MANAGEMENT | Facility: CLINIC | Age: 40
End: 2020-09-15

## 2020-09-15 ENCOUNTER — TRANSFERRED RECORDS (OUTPATIENT)
Dept: HEALTH INFORMATION MANAGEMENT | Facility: CLINIC | Age: 40
End: 2020-09-15

## 2020-09-21 ENCOUNTER — MYC MEDICAL ADVICE (OUTPATIENT)
Dept: FAMILY MEDICINE | Facility: CLINIC | Age: 40
End: 2020-09-21

## 2020-09-21 ENCOUNTER — VIRTUAL VISIT (OUTPATIENT)
Dept: FAMILY MEDICINE | Facility: CLINIC | Age: 40
End: 2020-09-21
Payer: COMMERCIAL

## 2020-09-21 DIAGNOSIS — K50.019 CROHN'S DISEASE OF SMALL INTESTINE WITH COMPLICATION (H): ICD-10-CM

## 2020-09-21 DIAGNOSIS — F32.1 MODERATE MAJOR DEPRESSION (H): ICD-10-CM

## 2020-09-21 DIAGNOSIS — F41.1 GENERALIZED ANXIETY DISORDER: Primary | ICD-10-CM

## 2020-09-21 PROCEDURE — 99214 OFFICE O/P EST MOD 30 MIN: CPT | Mod: 95 | Performed by: NURSE PRACTITIONER

## 2020-09-21 RX ORDER — FLUOXETINE 20 MG/1
40 TABLET ORAL DAILY
Qty: 180 TABLET | Refills: 1 | Status: SHIPPED | OUTPATIENT
Start: 2020-09-21 | End: 2020-12-11

## 2020-09-21 ASSESSMENT — PATIENT HEALTH QUESTIONNAIRE - PHQ9
SUM OF ALL RESPONSES TO PHQ QUESTIONS 1-9: 5
SUM OF ALL RESPONSES TO PHQ QUESTIONS 1-9: 5
10. IF YOU CHECKED OFF ANY PROBLEMS, HOW DIFFICULT HAVE THESE PROBLEMS MADE IT FOR YOU TO DO YOUR WORK, TAKE CARE OF THINGS AT HOME, OR GET ALONG WITH OTHER PEOPLE: SOMEWHAT DIFFICULT

## 2020-09-21 ASSESSMENT — ANXIETY QUESTIONNAIRES
7. FEELING AFRAID AS IF SOMETHING AWFUL MIGHT HAPPEN: SEVERAL DAYS
GAD7 TOTAL SCORE: 6
4. TROUBLE RELAXING: SEVERAL DAYS
6. BECOMING EASILY ANNOYED OR IRRITABLE: NOT AT ALL
3. WORRYING TOO MUCH ABOUT DIFFERENT THINGS: SEVERAL DAYS
GAD7 TOTAL SCORE: 6
1. FEELING NERVOUS, ANXIOUS, OR ON EDGE: SEVERAL DAYS
GAD7 TOTAL SCORE: 6
2. NOT BEING ABLE TO STOP OR CONTROL WORRYING: SEVERAL DAYS
7. FEELING AFRAID AS IF SOMETHING AWFUL MIGHT HAPPEN: SEVERAL DAYS
5. BEING SO RESTLESS THAT IT IS HARD TO SIT STILL: SEVERAL DAYS

## 2020-09-21 NOTE — LETTER
September 23, 2020      Soo Timmons  [address]  [place] [address]        To Whom It May Concern,          Soo is going through medical issues with her Crohn's, and social stressors. It is recommended, that she take the next semester off for recovery, through December 2020, so she can re-start with full effort in the spring.       Sincerely,        FRANCESCA Jones CNP

## 2020-09-21 NOTE — LETTER
September 22, 2020      Soo Timmons  [address]  [place] [address]        To Whom It May Concern,      Soo has a medical diagnosis of depression and anxiety. Although this is not a disability, she would benefit emotionally from a pet such as a small dog or cat.           Sincerely,        FRANCESCA Jones CNP

## 2020-09-22 ASSESSMENT — ANXIETY QUESTIONNAIRES: GAD7 TOTAL SCORE: 6

## 2020-09-23 ENCOUNTER — ALLIED HEALTH/NURSE VISIT (OUTPATIENT)
Dept: FAMILY MEDICINE | Facility: CLINIC | Age: 40
End: 2020-09-23
Payer: COMMERCIAL

## 2020-09-23 DIAGNOSIS — K50.019 CROHN'S DISEASE OF SMALL INTESTINE WITH COMPLICATION (H): ICD-10-CM

## 2020-09-23 DIAGNOSIS — Z23 NEED FOR PROPHYLACTIC VACCINATION AND INOCULATION AGAINST INFLUENZA: Primary | ICD-10-CM

## 2020-09-23 LAB
ALBUMIN SERPL-MCNC: 3.9 G/DL (ref 3.4–5)
ALP SERPL-CCNC: 65 U/L (ref 40–150)
ALT SERPL W P-5'-P-CCNC: 17 U/L (ref 0–50)
ANION GAP SERPL CALCULATED.3IONS-SCNC: 4 MMOL/L (ref 3–14)
AST SERPL W P-5'-P-CCNC: 9 U/L (ref 0–45)
BASOPHILS # BLD AUTO: 0 10E9/L (ref 0–0.2)
BASOPHILS NFR BLD AUTO: 0.2 %
BILIRUB SERPL-MCNC: 0.3 MG/DL (ref 0.2–1.3)
BUN SERPL-MCNC: 14 MG/DL (ref 7–30)
CALCIUM SERPL-MCNC: 8.8 MG/DL (ref 8.5–10.1)
CHLORIDE SERPL-SCNC: 113 MMOL/L (ref 94–109)
CO2 SERPL-SCNC: 26 MMOL/L (ref 20–32)
CREAT SERPL-MCNC: 0.92 MG/DL (ref 0.52–1.04)
CRP SERPL-MCNC: <2.9 MG/L (ref 0–8)
DIFFERENTIAL METHOD BLD: NORMAL
EOSINOPHIL # BLD AUTO: 0.3 10E9/L (ref 0–0.7)
EOSINOPHIL NFR BLD AUTO: 5.1 %
ERYTHROCYTE [DISTWIDTH] IN BLOOD BY AUTOMATED COUNT: 13.3 % (ref 10–15)
GFR SERPL CREATININE-BSD FRML MDRD: 78 ML/MIN/{1.73_M2}
GLUCOSE SERPL-MCNC: 81 MG/DL (ref 70–99)
HCT VFR BLD AUTO: 42.5 % (ref 35–47)
HGB BLD-MCNC: 13.7 G/DL (ref 11.7–15.7)
LYMPHOCYTES # BLD AUTO: 1.5 10E9/L (ref 0.8–5.3)
LYMPHOCYTES NFR BLD AUTO: 22.9 %
MCH RBC QN AUTO: 29.5 PG (ref 26.5–33)
MCHC RBC AUTO-ENTMCNC: 32.2 G/DL (ref 31.5–36.5)
MCV RBC AUTO: 91 FL (ref 78–100)
MONOCYTES # BLD AUTO: 0.5 10E9/L (ref 0–1.3)
MONOCYTES NFR BLD AUTO: 7.9 %
NEUTROPHILS # BLD AUTO: 4.1 10E9/L (ref 1.6–8.3)
NEUTROPHILS NFR BLD AUTO: 63.9 %
PLATELET # BLD AUTO: 232 10E9/L (ref 150–450)
POTASSIUM SERPL-SCNC: 3.7 MMOL/L (ref 3.4–5.3)
PROT SERPL-MCNC: 7 G/DL (ref 6.8–8.8)
RBC # BLD AUTO: 4.65 10E12/L (ref 3.8–5.2)
SODIUM SERPL-SCNC: 143 MMOL/L (ref 133–144)
VIT B12 SERPL-MCNC: 257 PG/ML (ref 193–986)
WBC # BLD AUTO: 6.5 10E9/L (ref 4–11)

## 2020-09-23 PROCEDURE — 85025 COMPLETE CBC W/AUTO DIFF WBC: CPT | Performed by: NURSE PRACTITIONER

## 2020-09-23 PROCEDURE — 90471 IMMUNIZATION ADMIN: CPT

## 2020-09-23 PROCEDURE — 82306 VITAMIN D 25 HYDROXY: CPT | Performed by: NURSE PRACTITIONER

## 2020-09-23 PROCEDURE — 90686 IIV4 VACC NO PRSV 0.5 ML IM: CPT

## 2020-09-23 PROCEDURE — 86140 C-REACTIVE PROTEIN: CPT | Performed by: NURSE PRACTITIONER

## 2020-09-23 PROCEDURE — 99207 ZZC NO CHARGE NURSE ONLY: CPT

## 2020-09-23 PROCEDURE — 82607 VITAMIN B-12: CPT | Performed by: NURSE PRACTITIONER

## 2020-09-23 PROCEDURE — 80053 COMPREHEN METABOLIC PANEL: CPT | Performed by: NURSE PRACTITIONER

## 2020-09-23 PROCEDURE — 36415 COLL VENOUS BLD VENIPUNCTURE: CPT | Performed by: NURSE PRACTITIONER

## 2020-09-23 PROCEDURE — 86481 TB AG RESPONSE T-CELL SUSP: CPT | Performed by: NURSE PRACTITIONER

## 2020-09-24 ENCOUNTER — INFUSION THERAPY VISIT (OUTPATIENT)
Dept: INFUSION THERAPY | Facility: CLINIC | Age: 40
End: 2020-09-24
Attending: NURSE PRACTITIONER
Payer: COMMERCIAL

## 2020-09-24 VITALS
HEART RATE: 58 BPM | RESPIRATION RATE: 16 BRPM | SYSTOLIC BLOOD PRESSURE: 110 MMHG | OXYGEN SATURATION: 97 % | TEMPERATURE: 98.7 F | BODY MASS INDEX: 21.02 KG/M2 | DIASTOLIC BLOOD PRESSURE: 69 MMHG | WEIGHT: 132.2 LBS

## 2020-09-24 DIAGNOSIS — K50.019 CROHN'S DISEASE OF ILEUM WITH COMPLICATION (H): Primary | ICD-10-CM

## 2020-09-24 LAB — DEPRECATED CALCIDIOL+CALCIFEROL SERPL-MC: 30 UG/L (ref 20–75)

## 2020-09-24 PROCEDURE — 96365 THER/PROPH/DIAG IV INF INIT: CPT

## 2020-09-24 PROCEDURE — 25000128 H RX IP 250 OP 636: Performed by: FAMILY MEDICINE

## 2020-09-24 PROCEDURE — 25800030 ZZH RX IP 258 OP 636: Performed by: FAMILY MEDICINE

## 2020-09-24 PROCEDURE — 25800030 ZZH RX IP 258 OP 636: Performed by: NURSE PRACTITIONER

## 2020-09-24 RX ADMIN — VEDOLIZUMAB 300 MG: 300 INJECTION, POWDER, LYOPHILIZED, FOR SOLUTION INTRAVENOUS at 12:13

## 2020-09-24 RX ADMIN — SODIUM CHLORIDE 250 ML: 9 INJECTION, SOLUTION INTRAVENOUS at 11:57

## 2020-09-24 ASSESSMENT — PAIN SCALES - GENERAL: PAINLEVEL: NO PAIN (0)

## 2020-09-24 NOTE — PROGRESS NOTES
Infusion Nursing Note:  Soo Timmons presents today for EntScribd.    Patient seen by provider today: No   present during visit today: Not Applicable.    Note: Patient denies any illness or on antibiotics.    Intravenous Access:  Peripheral IV placed.    Treatment Conditions:  Not Applicable.      Post Infusion Assessment:  Patient tolerated infusion without incident.  Site patent and intact, free from redness, edema or discomfort.  No evidence of extravasations.  Access discontinued per protocol.  Patie requested all of saline flush after infusion..       Discharge Plan:   Discharge instructions reviewed with: Patient.  AVS to patient via ralali.  Patient will return 11/20/20 for next appointment.   Patient discharged in stable condition accompanied by: self.  Departure Mode: Ambulatory.    Lady Warren RN

## 2020-09-25 LAB
GAMMA INTERFERON BACKGROUND BLD IA-ACNC: 0.2 IU/ML
M TB IFN-G CD4+ BCKGRND COR BLD-ACNC: 9.8 IU/ML
M TB TUBERC IFN-G BLD QL: NEGATIVE
MITOGEN IGNF BCKGRD COR BLD-ACNC: 0 IU/ML
MITOGEN IGNF BCKGRD COR BLD-ACNC: 0.02 IU/ML

## 2020-09-28 ENCOUNTER — OFFICE VISIT (OUTPATIENT)
Dept: OBGYN | Facility: CLINIC | Age: 40
End: 2020-09-28
Payer: COMMERCIAL

## 2020-09-28 VITALS
DIASTOLIC BLOOD PRESSURE: 75 MMHG | HEART RATE: 76 BPM | BODY MASS INDEX: 21.54 KG/M2 | WEIGHT: 135.5 LBS | SYSTOLIC BLOOD PRESSURE: 119 MMHG

## 2020-09-28 DIAGNOSIS — Z30.432 ENCOUNTER FOR IUD REMOVAL: Primary | ICD-10-CM

## 2020-09-28 PROCEDURE — 58301 REMOVE INTRAUTERINE DEVICE: CPT | Performed by: OBSTETRICS & GYNECOLOGY

## 2020-09-28 NOTE — PROGRESS NOTES
SUBJECTIVE:     Soo Timmons is a 40 year old requests removal of an Mirena IUD.   She is  interested in conceivingNo.   Reports adverse side effect from the IUD, No  Symptoms include with a small amount of bleeding;Pain Yes: , and/or increased vaginal discharge No.       Request alternative contraception Yes. Will use condoms.      OBJECTIVE    External genitalia: normal without lesions.  Vagina: normal mucosa and rugae, without  discharge.  Cervix: normal without lesion.    String is noted at the os, ringed forceps used to remove IUD.    Nurse for exam.    ASSESSMENT/PLAN    ICD-10-CM    1. Encounter for IUD removal  Z30.432 REMOVE INTRAUTERINE DEVICE        -Soo tolerated procedure without immediate complication.     -Follow up with unexplained fever, abdominal or pelvic pain.      -Plans to use condoms for contraception       All questions were answered.

## 2020-09-28 NOTE — PATIENT INSTRUCTIONS
If you have any questions regarding your visit, Please contact your care team.  [employer] [facility]: [phone]  [facility] CLINIC HOURS TELEPHONE NUMBER   Cephas Agbeh, M.D.      Zen Reyes-  Jennifer-         Monday-[place]    8:00a.m-4:45 p.m    Tuesday--[place]     8:00a.m-4:45 p.m.    Thursday-[place]    8:00a.m-4:45 p.m.    Friday-[place]    8:00a.m-4:45 p.m    [facility]   [address] [place] [address]   [phone]-Ask for [facility]   Fax [phone]   Lrpiude-[phone]     [facility]   [address] [place] [address] [place] [address]   [phone]    [facility]  [address] [address] [address]  [phone]  Qejgych-[phone]   Urgent Care locations:    [facility] Monday-Friday  5 pm - 9 pm  Saturday and Sunday   9 am - 5 pm   Monday-Friday   5 pm - 9 pm  Saturday and Sunday  9 am - 5 pm    [phone]   If you need a medication refill, please contact your pharmacy. Please allow 3 business days for your refill to be completed.  As always, Thank you for trusting us with your healthcare needs!

## 2020-10-05 ENCOUNTER — TRANSFERRED RECORDS (OUTPATIENT)
Dept: HEALTH INFORMATION MANAGEMENT | Facility: CLINIC | Age: 40
End: 2020-10-05

## 2020-10-08 ENCOUNTER — TRANSFERRED RECORDS (OUTPATIENT)
Dept: HEALTH INFORMATION MANAGEMENT | Facility: CLINIC | Age: 40
End: 2020-10-08

## 2020-11-20 ENCOUNTER — INFUSION THERAPY VISIT (OUTPATIENT)
Dept: INFUSION THERAPY | Facility: CLINIC | Age: 40
End: 2020-11-20
Payer: COMMERCIAL

## 2020-11-20 VITALS
RESPIRATION RATE: 16 BRPM | DIASTOLIC BLOOD PRESSURE: 67 MMHG | TEMPERATURE: 97.9 F | SYSTOLIC BLOOD PRESSURE: 107 MMHG | WEIGHT: 132.2 LBS | HEART RATE: 64 BPM | BODY MASS INDEX: 21.02 KG/M2 | OXYGEN SATURATION: 99 %

## 2020-11-20 DIAGNOSIS — K50.019 CROHN'S DISEASE OF ILEUM WITH COMPLICATION (H): Primary | ICD-10-CM

## 2020-11-20 PROCEDURE — 96365 THER/PROPH/DIAG IV INF INIT: CPT

## 2020-11-20 PROCEDURE — 250N000011 HC RX IP 250 OP 636: Performed by: FAMILY MEDICINE

## 2020-11-20 PROCEDURE — 258N000003 HC RX IP 258 OP 636: Performed by: FAMILY MEDICINE

## 2020-11-20 RX ADMIN — VEDOLIZUMAB 300 MG: 300 INJECTION, POWDER, LYOPHILIZED, FOR SOLUTION INTRAVENOUS at 09:32

## 2020-11-20 ASSESSMENT — PAIN SCALES - GENERAL: PAINLEVEL: NO PAIN (0)

## 2020-11-20 NOTE — PROGRESS NOTES
Infusion Nursing Note:  Soo Timmons presents today for EntBiofisicajeovanny.    Patient seen by provider today: No   present during visit today: Not Applicable.    Note: N/A.    Intravenous Access:  Peripheral IV placed.    Treatment Conditions:  Not Applicable.      Post Infusion Assessment:  Patient tolerated infusion without incident.  Site patent and intact, free from redness, edema or discomfort.  Access discontinued per protocol.       Discharge Plan:   Discharge instructions reviewed with: Patient.  Patient and/or family verbalized understanding of discharge instructions and all questions answered.  Patient discharged in stable condition accompanied by: self.  Departure Mode: Ambulatory.    Lady Warren RN

## 2020-11-23 ENCOUNTER — MYC MEDICAL ADVICE (OUTPATIENT)
Dept: FAMILY MEDICINE | Facility: CLINIC | Age: 40
End: 2020-11-23

## 2020-11-24 NOTE — TELEPHONE ENCOUNTER
Message read and patient is scheduled for virtual visit in December.  Will close encounter.   Marie Abdullahi MA on 11/24/2020 at 8:25 AM

## 2020-11-25 NOTE — PROGRESS NOTES
"Soo Timmons is a 40 year old female who is being evaluated via a billable video visit.      The patient has been notified of following:     \"This video visit will be conducted via a call between you and your physician/provider. We have found that certain health care needs can be provided without the need for an in-person physical exam.  This service lets us provide the care you need with a video conversation.  If a prescription is necessary we can send it directly to your pharmacy.  If lab work is needed we can place an order for that and you can then stop by our lab to have the test done at a later time.    Video visits are billed at different rates depending on your insurance coverage.  Please reach out to your insurance provider with any questions.    If during the course of the call the physician/provider feels a video visit is not appropriate, you will not be charged for this service.\"    Patient has given verbal consent for Video visit? Yes  How would you like to obtain your AVS? MyChart  If you are dropped from the video visit, the video invite should be resent to: Text to cell phone: [phone]  Will anyone else be joining your video visit? No      Subjective     Soo Timmons is a 40 year old female who presents today via video visit for the following health issues:    HPI     Birth Control     Would like to try a pill form that is continuous.              Video Start Time:0847    Heavy menses first 2-3 days of cycle off IUD. Goes through super pad every 1-2 hours those days. Cycles are regular.   Not currently sexually active.   Needs contraception, looking at options.   Is wanting an ablation consult.       Crohn's- stable on infusions,   Mood is stable when consistent with taking medications consistently. Divorce is final now.   Mood scores reviewed.         Review of Systems   Constitutional, HEENT, cardiovascular, pulmonary, gi and gu systems are negative, except as otherwise noted.    "   Objective           Vitals:  No vitals were obtained today due to virtual visit.    Physical Exam     GENERAL: Healthy, alert and no distress  EYES: Eyes grossly normal to inspection.  No discharge or erythema, or obvious scleral/conjunctival abnormalities.  RESP: No audible wheeze, cough, or visible cyanosis.  No visible retractions or increased work of breathing.    SKIN: Visible skin clear. No significant rash, abnormal pigmentation or lesions.  NEURO: Cranial nerves grossly intact.  Mentation and speech appropriate for age.  PSYCH: Mentation appears normal, affect normal/bright, judgement and insight intact, normal speech and appearance well-groomed.      Pelvic ultrasound reviewed.         Assessment & Plan     Diagnoses and all orders for this visit:    Encounter for initial prescription of vaginal ring hormonal contraceptive  -     etonogestrel-ethinyl estradiol (NUVARING) 0.12-0.015 MG/24HR vaginal ring; Place 1 each vaginally every 28 days    Menorrhagia with regular cycle  -     OB/GYN REFERRAL    Mild major depression (H)    Need for hepatitis C screening test  -     **Hepatitis C Screen Reflex to RNA FUTURE anytime; Future            Regular exercise  Healthy eating.     Contraception options reviewed, and pt. Chose Nuvaring.   Risk, timing, use and back-up method advised.   STD prevention.   Gyn referral- wants to discuss ablation, discussed age is a factor,and Nuvaring, should aid with menstrual symptoms.     Return in about 6 months (around 6/1/2021) for Virtual Visit.    FRANCESCA Jones CNP  [facility]      Video-Visit Details    Type of service:  Video Visit    Video End Time:0903    Originating Location (pt. Location): Home    Distant Location (provider location):  [facility]     Platform used for Video Visit: LilaKutu

## 2020-12-01 ENCOUNTER — VIRTUAL VISIT (OUTPATIENT)
Dept: FAMILY MEDICINE | Facility: CLINIC | Age: 40
End: 2020-12-01
Payer: COMMERCIAL

## 2020-12-01 DIAGNOSIS — Z30.015 ENCOUNTER FOR INITIAL PRESCRIPTION OF VAGINAL RING HORMONAL CONTRACEPTIVE: Primary | ICD-10-CM

## 2020-12-01 DIAGNOSIS — Z11.59 NEED FOR HEPATITIS C SCREENING TEST: ICD-10-CM

## 2020-12-01 DIAGNOSIS — F32.0 MILD MAJOR DEPRESSION (H): ICD-10-CM

## 2020-12-01 DIAGNOSIS — N92.0 MENORRHAGIA WITH REGULAR CYCLE: ICD-10-CM

## 2020-12-01 PROCEDURE — 99214 OFFICE O/P EST MOD 30 MIN: CPT | Mod: 95 | Performed by: NURSE PRACTITIONER

## 2020-12-01 RX ORDER — ETONOGESTREL AND ETHINYL ESTRADIOL VAGINAL RING .015; .12 MG/D; MG/D
1 RING VAGINAL
Qty: 3 EACH | Refills: 0 | Status: SHIPPED | OUTPATIENT
Start: 2020-12-01 | End: 2021-03-03

## 2020-12-01 ASSESSMENT — PATIENT HEALTH QUESTIONNAIRE - PHQ9
10. IF YOU CHECKED OFF ANY PROBLEMS, HOW DIFFICULT HAVE THESE PROBLEMS MADE IT FOR YOU TO DO YOUR WORK, TAKE CARE OF THINGS AT HOME, OR GET ALONG WITH OTHER PEOPLE: SOMEWHAT DIFFICULT
SUM OF ALL RESPONSES TO PHQ QUESTIONS 1-9: 5
SUM OF ALL RESPONSES TO PHQ QUESTIONS 1-9: 5

## 2020-12-01 ASSESSMENT — ANXIETY QUESTIONNAIRES
7. FEELING AFRAID AS IF SOMETHING AWFUL MIGHT HAPPEN: NOT AT ALL
3. WORRYING TOO MUCH ABOUT DIFFERENT THINGS: SEVERAL DAYS
7. FEELING AFRAID AS IF SOMETHING AWFUL MIGHT HAPPEN: NOT AT ALL
2. NOT BEING ABLE TO STOP OR CONTROL WORRYING: SEVERAL DAYS
4. TROUBLE RELAXING: SEVERAL DAYS
GAD7 TOTAL SCORE: 7
GAD7 TOTAL SCORE: 7
1. FEELING NERVOUS, ANXIOUS, OR ON EDGE: SEVERAL DAYS
5. BEING SO RESTLESS THAT IT IS HARD TO SIT STILL: SEVERAL DAYS
6. BECOMING EASILY ANNOYED OR IRRITABLE: MORE THAN HALF THE DAYS

## 2020-12-02 ASSESSMENT — ANXIETY QUESTIONNAIRES: GAD7 TOTAL SCORE: 7

## 2020-12-02 ASSESSMENT — PATIENT HEALTH QUESTIONNAIRE - PHQ9: SUM OF ALL RESPONSES TO PHQ QUESTIONS 1-9: 5

## 2020-12-03 ENCOUNTER — TELEPHONE (OUTPATIENT)
Dept: FAMILY MEDICINE | Facility: CLINIC | Age: 40
End: 2020-12-03

## 2020-12-03 NOTE — TELEPHONE ENCOUNTER
Central Prior Authorization Team  Phone: [phone]    PA Initiation    Medication: FLUoxetine HCl, PMDD, 20 MG TABS  Insurance Company: [employer] PMAP - Phone [phone] Fax [phone]  Pharmacy Filling the Rx: [id number] - [place] - [address]  Filling Pharmacy Phone: [phone]  Filling Pharmacy Fax:    Start Date: 12/3/2020

## 2020-12-03 NOTE — TELEPHONE ENCOUNTER
Routing to PA Pool please start a PRIOR AUTHORIZATION    FLUoxetine HCl, PMDD, 20 MG TABS 180 tablet 1 9/21/2020  --   Sig - Route: Take 40 mg by mouth daily - Oral         Thanks  Beulah Becker RT (R)

## 2020-12-03 NOTE — TELEPHONE ENCOUNTER
Prior Authorization Approval    Authorization Effective Date: 9/3/2020  Authorization Expiration Date: 12/3/2021  Medication: FLUoxetine HCl, PMDD, 20 MG TABS- APPROVED   Approved Dose/Quantity:   Reference #:     Insurance Company: [employer] PMAP - Phone [phone] Fax [phone]  Expected CoPay:       CoPay Card Available:      Foundation Assistance Needed:    Which Pharmacy is filling the prescription (Not needed for infusion/clinic administered): [id number] - [place], [place] - [address] [address]  Pharmacy Notified: Yes  Patient Notified:  **Instructed pharmacy to notify patient when script is ready to /ship.**

## 2020-12-11 ENCOUNTER — TELEPHONE (OUTPATIENT)
Dept: FAMILY MEDICINE | Facility: CLINIC | Age: 40
End: 2020-12-11

## 2020-12-11 DIAGNOSIS — F41.1 GENERALIZED ANXIETY DISORDER: ICD-10-CM

## 2020-12-11 RX ORDER — FLUOXETINE 40 MG/1
40 CAPSULE ORAL DAILY
Qty: 90 CAPSULE | Refills: 1 | Status: SHIPPED | OUTPATIENT
Start: 2020-12-11

## 2020-12-11 NOTE — TELEPHONE ENCOUNTER
Please call [facility] pharmacy , they have questions about the approval of patients prior auth. Do you want to change the fluoxetine?   [phone]

## 2020-12-22 ENCOUNTER — MEDICAL CORRESPONDENCE (OUTPATIENT)
Dept: HEALTH INFORMATION MANAGEMENT | Facility: CLINIC | Age: 40
End: 2020-12-22

## 2021-01-19 ENCOUNTER — INFUSION THERAPY VISIT (OUTPATIENT)
Dept: INFUSION THERAPY | Facility: CLINIC | Age: 41
End: 2021-01-19
Attending: NURSE PRACTITIONER
Payer: COMMERCIAL

## 2021-01-19 VITALS
RESPIRATION RATE: 16 BRPM | DIASTOLIC BLOOD PRESSURE: 76 MMHG | OXYGEN SATURATION: 100 % | HEART RATE: 68 BPM | BODY MASS INDEX: 21.65 KG/M2 | WEIGHT: 136.2 LBS | SYSTOLIC BLOOD PRESSURE: 102 MMHG | TEMPERATURE: 98.5 F

## 2021-01-19 DIAGNOSIS — K50.019 CROHN'S DISEASE OF ILEUM WITH COMPLICATION (H): Primary | ICD-10-CM

## 2021-01-19 PROCEDURE — 258N000003 HC RX IP 258 OP 636: Performed by: FAMILY MEDICINE

## 2021-01-19 PROCEDURE — 96365 THER/PROPH/DIAG IV INF INIT: CPT

## 2021-01-19 PROCEDURE — 250N000011 HC RX IP 250 OP 636: Performed by: FAMILY MEDICINE

## 2021-01-19 PROCEDURE — 258N000003 HC RX IP 258 OP 636: Performed by: NURSE PRACTITIONER

## 2021-01-19 RX ADMIN — VEDOLIZUMAB 300 MG: 300 INJECTION, POWDER, LYOPHILIZED, FOR SOLUTION INTRAVENOUS at 09:26

## 2021-01-19 RX ADMIN — SODIUM CHLORIDE 250 ML: 9 INJECTION, SOLUTION INTRAVENOUS at 09:18

## 2021-01-19 ASSESSMENT — PAIN SCALES - GENERAL: PAINLEVEL: MODERATE PAIN (4)

## 2021-01-19 NOTE — PROGRESS NOTES
Infusion Nursing Note:  Soo Timmons presents today for Entyvio.    Patient seen by provider today: No   present during visit today: Not Applicable.    Note: Patient states she is having some stress related to her job. Mild to moderate abdominal pain. Stools ok. Did have nausea for 2 days after last Entyvio infusion. Has Zofran to take at home. No other symptoms or concerns admitted to today.   Patient did not meet criteria for an asymptomatic covid-19 PCR test in infusion today. Patient declined the covid-19 test.    Intravenous Access:  Peripheral IV placed.    Treatment Conditions:  Biological Infusion Checklist:  ~~~ NOTE: If the patient answers yes to any of the questions below, hold the infusion and contact ordering provider or on-call provider.    1. Have you recently had an elevated temperature, fever, chills, productive cough, coughing for 3 weeks or longer or hemoptysis, abnormal vital signs, night sweats,  chest pain or have you noticed a decrease in your appetite, unexplained weight loss or fatigue? No  2. Do you have any open wounds or new incisions? No  3. Do you have any recent or upcoming hospitalizations, surgeries or dental procedures? No  4. Do you currently have or recently have had any signs of illness or infection or are you on any antibiotics? No  5. Have you had any new, sudden or worsening abdominal pain? No  6. Have you or anyone in your household received a live vaccination in the past 4 weeks? Please note:  No live vaccines while on biologic/chemotherapy until 6 months after the last treatment.  Patient can receive the flu vaccine (shot only) and the pneumovax.  It is optimal for the patient to get these vaccines mid cycle, but they can be given at any time as long as it is not on the day of the infusion. No  7. Have you recently been diagnosed with any new nervous system diseases (ie. Multiple sclerosis, Guillain [place], seizures, neurological changes) or cancer diagnosis?  No  8. Are you on any form of radiation or chemotherapy? No  9. Are you pregnant or breast feeding or do you have plans of pregnancy in the future? No  10. Have you been having any signs of worsening depression or suicidal ideations?  (benlysta only)N/A  11. Have there been any other new onset medical symptoms? No        Post Infusion Assessment:  Patient tolerated infusion without incident. VSS.  Blood return noted pre and post infusion.  Site patent and intact, free from redness, edema or discomfort.  No evidence of extravasations.  PIV access discontinued per protocol.    POST-INFUSION OF BIOLOGICAL MEDICATION:    Reviewed with patient.  Given biologic medication or medication hand-out. Inform patient if any fever, chills or signs of infection, new symptoms, abdominal pain, heart palpitations, shortness of breath, reaction, weakness, neurological changes, seek medical attention immediately and should not receive infusions. No live virus vaccines prior to or during treatment or up to 6 months post infusion. If the patient has an upcoming procedure or surgery, this should be discussed with the rheumatologist and surgeon or provider.      Discharge Plan:   Copy of AVS reviewed with patient and/or family.  Patient will return 3/16 for next appointment.  Patient discharged in stable condition accompanied by: self.  Departure Mode: Ambulatory.    Judith Thompson RN

## 2021-01-19 NOTE — PATIENT INSTRUCTIONS
POST-INFUSION OF BIOLOGICAL MEDICATION:    Reviewed with patient.  Given biologic medication or medication hand-out. Inform patient if any fever, chills or signs of infection, new symptoms, abdominal pain, heart palpitations, shortness of breath, reaction, weakness, neurological changes, seek medical attention immediately and should not receive infusions. No live virus vaccines prior to or during treatment or up to 6 months post infusion. If the patient has an upcoming procedure or surgery, this should be discussed with the rheumatologist and surgeon or provider.

## 2021-03-03 DIAGNOSIS — Z30.015 ENCOUNTER FOR INITIAL PRESCRIPTION OF VAGINAL RING HORMONAL CONTRACEPTIVE: ICD-10-CM

## 2021-03-03 RX ORDER — ETONOGESTREL AND ETHINYL ESTRADIOL VAGINAL RING .015; .12 MG/D; MG/D
RING VAGINAL
Qty: 3 EACH | Refills: 1 | Status: SHIPPED | OUTPATIENT
Start: 2021-03-03 | End: 2021-09-28

## 2021-03-03 NOTE — TELEPHONE ENCOUNTER
Prescription approved per [facility] Refill Protocol.    Beulah West RN on 3/3/2021 at 10:34 AM

## 2021-03-16 ENCOUNTER — INFUSION THERAPY VISIT (OUTPATIENT)
Dept: INFUSION THERAPY | Facility: CLINIC | Age: 41
End: 2021-03-16
Attending: NURSE PRACTITIONER
Payer: COMMERCIAL

## 2021-03-16 VITALS
OXYGEN SATURATION: 100 % | WEIGHT: 136.4 LBS | RESPIRATION RATE: 16 BRPM | SYSTOLIC BLOOD PRESSURE: 109 MMHG | TEMPERATURE: 97.7 F | HEART RATE: 76 BPM | DIASTOLIC BLOOD PRESSURE: 69 MMHG | BODY MASS INDEX: 21.69 KG/M2

## 2021-03-16 DIAGNOSIS — K50.019 CROHN'S DISEASE OF ILEUM WITH COMPLICATION (H): Primary | ICD-10-CM

## 2021-03-16 PROCEDURE — 96365 THER/PROPH/DIAG IV INF INIT: CPT

## 2021-03-16 PROCEDURE — 250N000011 HC RX IP 250 OP 636: Performed by: FAMILY MEDICINE

## 2021-03-16 PROCEDURE — 258N000003 HC RX IP 258 OP 636: Performed by: FAMILY MEDICINE

## 2021-03-16 RX ADMIN — VEDOLIZUMAB 300 MG: 300 INJECTION, POWDER, LYOPHILIZED, FOR SOLUTION INTRAVENOUS at 10:05

## 2021-03-16 ASSESSMENT — PAIN SCALES - GENERAL: PAINLEVEL: MILD PAIN (3)

## 2021-03-16 NOTE — PROGRESS NOTES
Infusion Nursing Note:  Soo NOONAN Shanelle presents today for Entjeovanny.    Patient seen by provider today: No   present during visit today: Not Applicable.    Note: N/A.  Patient  Did meet criteria for an asymptomatic covid-19 PCR test in infusion today. Patient  declined the covid-19 test.    Intravenous Access:  No labs at this visit.    Treatment Conditions:  Denies any illness, fever or on antiobiotics..      Post Infusion Assessment:  Patient tolerated infusion without incident.  Patient observed for 30 minutes post infusion per protocol. Patient requested remaining 250ml saline given  Blood return noted pre and post infusion.       Discharge Plan:   Discharge instructions reviewed with: Patient.  Patient and/or family verbalized understanding of discharge instructions and all questions answered.  Patient discharged in stable condition accompanied by: self.  Departure Mode: Ambulatory.    Lady Warren RN

## 2021-05-11 ENCOUNTER — INFUSION THERAPY VISIT (OUTPATIENT)
Dept: INFUSION THERAPY | Facility: CLINIC | Age: 41
End: 2021-05-11
Attending: NURSE PRACTITIONER
Payer: COMMERCIAL

## 2021-05-11 VITALS
DIASTOLIC BLOOD PRESSURE: 67 MMHG | RESPIRATION RATE: 16 BRPM | HEIGHT: 67 IN | TEMPERATURE: 97.5 F | BODY MASS INDEX: 21.17 KG/M2 | SYSTOLIC BLOOD PRESSURE: 106 MMHG | WEIGHT: 134.9 LBS | HEART RATE: 68 BPM | OXYGEN SATURATION: 100 %

## 2021-05-11 DIAGNOSIS — K50.019 CROHN'S DISEASE OF ILEUM WITH COMPLICATION (H): Primary | ICD-10-CM

## 2021-05-11 PROCEDURE — 258N000003 HC RX IP 258 OP 636: Performed by: FAMILY MEDICINE

## 2021-05-11 PROCEDURE — 96365 THER/PROPH/DIAG IV INF INIT: CPT

## 2021-05-11 PROCEDURE — 258N000003 HC RX IP 258 OP 636: Performed by: NURSE PRACTITIONER

## 2021-05-11 PROCEDURE — 250N000011 HC RX IP 250 OP 636: Performed by: FAMILY MEDICINE

## 2021-05-11 RX ADMIN — VEDOLIZUMAB 300 MG: 300 INJECTION, POWDER, LYOPHILIZED, FOR SOLUTION INTRAVENOUS at 09:47

## 2021-05-11 RX ADMIN — SODIUM CHLORIDE 250 ML: 9 INJECTION, SOLUTION INTRAVENOUS at 09:34

## 2021-05-11 ASSESSMENT — MIFFLIN-ST. JEOR: SCORE: 1311.59

## 2021-05-11 ASSESSMENT — PAIN SCALES - GENERAL: PAINLEVEL: MILD PAIN (3)

## 2021-05-11 NOTE — PROGRESS NOTES
Infusion Nursing Note:  Soo NOONAN Shanelle presents today for Glassy Pro.    Patient seen by provider today: No   present during visit today: Not Applicable.    Note: Patient complains of a migraine the last week and some typical abdominal pain.    Patient  did meet criteria for an asymptomatic covid-19 PCR test in infusion today. Patient declined the covid-19 test.    Intravenous Access:  Peripheral IV placed.    Treatment Conditions:  Biological Infusion Checklist:  ~~~ NOTE: If the patient answers yes to any of the questions below, hold the infusion and contact ordering provider or on-call provider.    1. Have you recently had an elevated temperature, fever, chills, productive cough, coughing for 3 weeks or longer or hemoptysis, abnormal vital signs, night sweats,  chest pain or have you noticed a decrease in your appetite, unexplained weight loss or fatigue? No  2. Do you have any open wounds or new incisions? No  3. Do you have any recent or upcoming hospitalizations, surgeries or dental procedures? No  4. Do you currently have or recently have had any signs of illness or infection or are you on any antibiotics? No  5. Have you had any new, sudden or worsening abdominal pain? No  6. Have you or anyone in your household received a live vaccination in the past 4 weeks? Please note:  No live vaccines while on biologic/chemotherapy until 6 months after the last treatment.  Patient can receive the flu vaccine (shot only) and the pneumovax.  It is optimal for the patient to get these vaccines mid cycle, but they can be given at any time as long as it is not on the day of the infusion. No  7. Have you recently been diagnosed with any new nervous system diseases (ie. Multiple sclerosis, Guillain [place], seizures, neurological changes) or cancer diagnosis? No  8. Are you on any form of radiation or chemotherapy? No  9. Are you pregnant or breast feeding or do you have plans of pregnancy in the future? No  10. Have  you been having any signs of worsening depression or suicidal ideations?  (benlysta only) No  11. Have there been any other new onset medical symptoms? No        Post Infusion Assessment:  Patient tolerated infusion without incident.  Patient observed for 15 minutes post infusion per protocol.  Blood return noted pre and post infusion.  Site patent and intact, free from redness, edema or discomfort.  No evidence of extravasations.  Access discontinued per protocol.  Biologic Infusion Post Education: Call the triage nurse at your clinic or seek medical attention if you have chills and/or temperature greater than or equal to 100.5, uncontrolled nausea/vomiting, diarrhea, constipation, dizziness, shortness of breath, chest pain, heart palpitations, weakness or any other new or concerning symptoms, questions or concerns.  You cannot have any live virus vaccines prior to or during treatment or up to 6 months post infusion.  If you have an upcoming surgery, medical procedure or dental procedure during treatment, this should be discussed with your ordering physician and your surgeon/dentist.  If you are having any concerning symptom, if you are unsure if you should get your next infusion or wish to speak to a provider before your next infusion, please call your care coordinator or triage nurse at your clinic to notify them so we can adequately serve you.       Discharge Plan:   Discharge instructions reviewed with: Patient.  Patient and/or family verbalized understanding of discharge instructions and all questions answered.  Patient discharged in stable condition accompanied by: self.  Departure Mode: Ambulatory.    Jasmyn Thompson RN

## 2021-05-11 NOTE — PATIENT INSTRUCTIONS
Pt to return on 07/06/21 for Entyvio. Copies of medication list and upcoming appointments given prior to discharge.

## 2021-05-27 NOTE — PROGRESS NOTES
The patient was seen for a neuropsychological evaluation for the purposes of diagnostic clarification and treatment planning. 48 minutes of face-to-face testing were provided by this writer. An additional 5 minutes were spent scoring and compiling test results.The patient was cooperative with testing. No concerns were brought to my attention. Please see Dr. Duvall's report for a detailed description of the charges and interpretation and integration of the findings.      Yohana Rivesr MA, Psychology Pre-Doctoral Intern

## 2021-06-19 NOTE — LETTER
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 decode a series of abstract symbols.  Her performance fell in the severely impaired range on a task that required her to quickly connect a series of numbers presented in a visual array on a page. Her performance was in the moderately impaired range on a subsequent task that required mental flexibility and set-shifting to quickly alternate between sequencing letters and numbers presented on a page.    Visual attention and spatial localization skills were average. Her copy of a complex figure was performed in the severely impaired range. Of note, her drawing was notable for a rather careless and somewhat inattentive approach.     With regard to learning and memory, Ms. Timmons was administered a measure of rote auditory verbal list learning that required her to learn a series of 10 words over trials and retain and recall them over a long delay. Her initial rate of learning (4,8,9,8) fell in the average range of functioning. She retained and recalled 7 words after the delay for average delayed recall performance.  Recognition memory was low average 19/20. Contextual auditory verbal learning  abilities were average as she learned 9 and 9 details over two trials. After a delay, she retained and recalled 6 details for mildly impaired delayed recall performance. Delayed nonverbal memory for a complex figure was assessed in the moderately impaired range.     On the Patient Health Questionnaire-9, a self report measure of depressive symptomatology, she obtained a score of 20, placing her in the range of severe depression. She denied suicidal ideation.    On the Generalized Anxiety Disorder-7, a self-report measure of anxiety, she obtained a score of 17, placing her in the range of severe anxiety.     On a post-concussive symptom checklist, Ms. Timmons endorsed significant concerns (scores of 4 or greater) related to headache, nausea, balance problems, dizziness, fatigue, trouble falling asleep, sleeping more than usual, drowsiness, photosensitivity, phonosensitivity, sadness, nervousness, feeling more emotional, feeling slowed down, mental fogginess, difficulty concentrating, difficulty remembering and vision problems.      EVALUATION SERVICES AND TIME:   A clinical interview/neurobehavioral status examination was conducted with the patient and documented. I thoroughly reviewed the medical record, selected the neuropsychological test battery, provided supervision to the trained examiner/technician, interpreted/integrated patient data and test results, engaged in clinical decision making and treatment planning, report writing/preparation and provided feedback of results. I directly administered and scored 2+ of the neuropsychological tests. A trained examiner/technician administered and scored the remainder of the neuropsychological tests (2 + tests).  Please see below for a breakdown of time spent and the associated codes billed for these services.    Services   Time Spent  CPT Codes   Neurobehavioral Status Exam:  (e.g., face-to-face, interpretation, report)   70 minutes 1 x 96116   Neuropsychological  Evaluation Services:   (e.g., integration, interpretation, treatment planning, clinical decision making, feedback)   117 minutes   1 x 96132  1 x 96133   Neuropsychological Testing by Psychologist:  (e.g., test administration, scoring, 2+ tests administered)   21 minutes   1 x 96136   Neuropsychological Testing by Trained Examiner/Technician:  (e.g., test administration, scoring, 2+ tests administered)   52 minutes   1 x 96138  1 x 96139     Diagnosis:  Mild Traumatic Brain Injury, resolving  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 Timmons has a history of February 8th concussion and was referred for an evaluation of Mild Neurocognitive Disorder due to Traumatic Brain Injury. Feedback of results was provided at the end of today's evaluation.       Marija Duvall, PhD, LP, ABPP  Clinical Neuropsychologist, [id number]  Board Certified in Clinical [place] [address]  Phone:  [phone]

## 2021-06-19 NOTE — LETTER
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 performance fell in the severely impaired range on a task that required her to quickly connect a series of numbers presented in a visual array on a page. Her performance was in the moderately impaired range on a subsequent task that required mental flexibility and set-shifting to quickly alternate between sequencing letters and numbers presented on a page.    Visual attention and spatial localization skills were average. Her copy of a complex figure was performed in the severely impaired range. Of note, her drawing was notable for a rather careless and somewhat inattentive approach.     With regard to learning and memory, Ms. Timmons was administered a measure of rote auditory verbal list learning that required her to learn a series of 10 words over trials and retain and recall them over a long delay. Her initial rate of learning (4,8,9,8) fell in the average range of functioning. She retained and recalled 7 words after the delay for average delayed recall performance.  Recognition memory was low average 19/20. Contextual auditory verbal learning abilities were average as she learned 9 and 9  details over two trials. After a delay, she retained and recalled 6 details for mildly impaired delayed recall performance. Delayed nonverbal memory for a complex figure was assessed in the moderately impaired range.     On the Patient Health Questionnaire-9, a self report measure of depressive symptomatology, she obtained a score of 20, placing her in the range of severe depression. She denied suicidal ideation.    On the Generalized Anxiety Disorder-7, a self-report measure of anxiety, she obtained a score of 17, placing her in the range of severe anxiety.     On a post-concussive symptom checklist, Ms. Timmons endorsed significant concerns (scores of 4 or greater) related to headache, nausea, balance problems, dizziness, fatigue, trouble falling asleep, sleeping more than usual, drowsiness, photosensitivity, phonosensitivity, sadness, nervousness, feeling more emotional, feeling slowed down, mental fogginess, difficulty concentrating, difficulty remembering and vision problems.      EVALUATION SERVICES AND TIME:   A clinical interview/neurobehavioral status examination was conducted with the patient and documented. I thoroughly reviewed the medical record, selected the neuropsychological test battery, provided supervision to the trained examiner/technician, interpreted/integrated patient data and test results, engaged in clinical decision making and treatment planning, report writing/preparation and provided feedback of results. I directly administered and scored 2+ of the neuropsychological tests. A trained examiner/technician administered and scored the remainder of the neuropsychological tests (2 + tests).  Please see below for a breakdown of time spent and the associated codes billed for these services.    Services   Time Spent  CPT Codes   Neurobehavioral Status Exam:  (e.g., face-to-face, interpretation, report)   70 minutes 1 x 96116   Neuropsychological Evaluation Services:   (e.g., integration,  interpretation, treatment planning, clinical decision making, feedback)   117 minutes   1 x 96132  1 x 96133   Neuropsychological Testing by Psychologist:  (e.g., test administration, scoring, 2+ tests administered)   21 minutes   1 x 96136   Neuropsychological Testing by Trained Examiner/Technician:  (e.g., test administration, scoring, 2+ tests administered)   52 minutes   1 x 96138  1 x 96139     Diagnosis:  Mild Traumatic Brain Injury, resolving  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 Timmons has a history of February 8th concussion and was referred for an evaluation of Mild Neurocognitive Disorder due to Traumatic Brain Injury. Feedback of results was provided at the end of today's evaluation.       Marija Duvall, PhD, LP, ABPP  Clinical Neuropsychologist, [id number]  Board Certified in Clinical [place] [address]  Phone:  [phone]

## 2021-07-06 ENCOUNTER — INFUSION THERAPY VISIT (OUTPATIENT)
Dept: INFUSION THERAPY | Facility: CLINIC | Age: 41
End: 2021-07-06
Attending: NURSE PRACTITIONER
Payer: COMMERCIAL

## 2021-07-06 VITALS
SYSTOLIC BLOOD PRESSURE: 118 MMHG | RESPIRATION RATE: 18 BRPM | DIASTOLIC BLOOD PRESSURE: 83 MMHG | OXYGEN SATURATION: 100 % | TEMPERATURE: 97.5 F | WEIGHT: 139.4 LBS | HEIGHT: 67 IN | BODY MASS INDEX: 21.88 KG/M2 | HEART RATE: 72 BPM

## 2021-07-06 DIAGNOSIS — K50.019 CROHN'S DISEASE OF ILEUM WITH COMPLICATION (H): Primary | ICD-10-CM

## 2021-07-06 PROCEDURE — 250N000011 HC RX IP 250 OP 636: Performed by: FAMILY MEDICINE

## 2021-07-06 PROCEDURE — 258N000003 HC RX IP 258 OP 636: Performed by: NURSE PRACTITIONER

## 2021-07-06 PROCEDURE — 258N000003 HC RX IP 258 OP 636: Performed by: FAMILY MEDICINE

## 2021-07-06 PROCEDURE — 96365 THER/PROPH/DIAG IV INF INIT: CPT

## 2021-07-06 RX ADMIN — VEDOLIZUMAB 300 MG: 300 INJECTION, POWDER, LYOPHILIZED, FOR SOLUTION INTRAVENOUS at 09:53

## 2021-07-06 RX ADMIN — SODIUM CHLORIDE 250 ML: 9 INJECTION, SOLUTION INTRAVENOUS at 09:32

## 2021-07-06 ASSESSMENT — MIFFLIN-ST. JEOR: SCORE: 1327

## 2021-07-06 ASSESSMENT — PAIN SCALES - GENERAL: PAINLEVEL: SEVERE PAIN (6)

## 2021-07-06 NOTE — PROGRESS NOTES
Infusion Nursing Note:  Soo NOONAN Timmons presents today for [employer].    Patient seen by provider today: No   present during visit today: Not Applicable.    Note: Patient complains of feeling bad about 1 week before infusion, pain and sick to stomach. Denies any new sickness, abx use or hospitalizations.      Patient  did meet criteria for an asymptomatic covid-19 PCR test in infusion today. Patient declined the covid-19 test.    Intravenous Access:  Peripheral IV placed.    Treatment Conditions:  Not Applicable.      Post Infusion Assessment:  Patient tolerated infusion without incident.  Blood return noted pre and post infusion.  Site patent and intact, free from redness, edema or discomfort.  No evidence of extravasations.  Access discontinued per protocol.       Discharge Plan:   Discharge instructions reviewed with: Patient.  Patient and/or family verbalized understanding of discharge instructions and all questions answered.  Patient discharged in stable condition accompanied by: self.  Departure Mode: Ambulatory.      Jasmyn Thompson RN

## 2021-09-25 DIAGNOSIS — Z30.015 ENCOUNTER FOR INITIAL PRESCRIPTION OF VAGINAL RING HORMONAL CONTRACEPTIVE: ICD-10-CM

## 2021-09-28 RX ORDER — ETONOGESTREL AND ETHINYL ESTRADIOL VAGINAL RING .015; .12 MG/D; MG/D
RING VAGINAL
Qty: 3 EACH | Refills: 0 | Status: SHIPPED | OUTPATIENT
Start: 2021-09-28

## 2021-09-28 NOTE — TELEPHONE ENCOUNTER
Pending Prescriptions:                       Disp   Refills    etonogestrel-ethinyl estradiol (NUVARING)*3 each 0            Sig: INSERT ONE RING VAGINALLY EVERY 28 DAYS    Medication is being filled for 1 time erlinda refill only due to:  Patient is due for visit before further refills    Please call and help schedule.  Thank you!

## 2021-10-19 PROBLEM — F32.9 MAJOR DEPRESSION: Status: ACTIVE | Noted: 2020-05-18

## 2021-10-23 ENCOUNTER — HEALTH MAINTENANCE LETTER (OUTPATIENT)
Age: 41
End: 2021-10-23

## 2022-03-31 DIAGNOSIS — Z30.015 ENCOUNTER FOR INITIAL PRESCRIPTION OF VAGINAL RING HORMONAL CONTRACEPTIVE: ICD-10-CM

## 2022-03-31 RX ORDER — ETONOGESTREL AND ETHINYL ESTRADIOL VAGINAL RING .015; .12 MG/D; MG/D
RING VAGINAL
Refills: 0 | OUTPATIENT
Start: 2022-03-31

## 2022-03-31 NOTE — TELEPHONE ENCOUNTER
Pending Prescriptions:                       Disp   Refills    etonogestrel-ethinyl estradiol (NUVARING) *       0        Sig: INSERT ONE RING VAGINALLY AND LEAVE IN PLACE FOR           THREE CONSECUTIVE WEEKS, REMOVE FOR ONE WEEK AND           REPEAT WITH NEW RING.    Routing refill request to provider for review/approval because:  Patient needs to be seen because it has been more than 1 year since last office visit.  Corinne Choi RN

## 2022-06-03 ENCOUNTER — MYC MEDICAL ADVICE (OUTPATIENT)
Dept: FAMILY MEDICINE | Facility: CLINIC | Age: 42
End: 2022-06-03
Payer: COMMERCIAL

## 2022-06-03 NOTE — TELEPHONE ENCOUNTER
Patient Quality Outreach    Patient is due for the following:   Depression  -  PHQ-9 Needed  Physical  - due now  Immunizations  -  Covid and Tdap    NEXT STEPS:   Schedule a yearly physical    Type of outreach:    Sent [employer] message.      Questions for provider review:    None     Yahaira Conner, [facility]  Chart routed to Care Team.

## 2022-06-07 NOTE — TELEPHONE ENCOUNTER
Pt responded and is no longer a [facility] pt. PCP updated and closing encounter.    Yahaira Conner CMA ([facility])

## 2022-10-09 ENCOUNTER — HEALTH MAINTENANCE LETTER (OUTPATIENT)
Age: 42
End: 2022-10-09

## 2022-11-26 ENCOUNTER — HEALTH MAINTENANCE LETTER (OUTPATIENT)
Age: 42
End: 2022-11-26

## 2023-09-19 NOTE — PROGRESS NOTES
Subjective     Soo Timmons is a 39 year old female who presents to clinic today for the following health issues:    Cough     History of Present Illness        She eats 2-3 servings of fruits and vegetables daily.She consumes 0 sweetened beverage(s) daily.  She is taking medications regularly.     Acute Illness   Acute illness concerns: cough   Onset: 2 weeks     Fever: YES. 100 temps.    Chills/Sweats: YES.both.    Headache (location?): YES. Constantly. And forgetting things on top of it     Sinus Pressure:YES.     Conjunctivitis:  no     Ear Pain: YES. Both.    Rhinorrhea: YES    Congestion: YES    Sore Throat: YES. Post nasal drip.   Rash - right arm, stomach at scar site, and right leg.   Rib pain that radiates into back.    Cough: YES. Green sputum.     Wheeze: YES.    Decreased Appetite: YES.    Nausea: YES.    Vomiting: no     Diarrhea:  YES. 2 weeks- patient has Chron's.     Dysuria/Freq.: YES. UTI is ruled out.     Fatigue/Achiness: YES.both.    Sick/Strep Exposure: YES. 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      Therapies Tried and outcome: Benadryl and Tylenol.       Answers for HPI/ROS submitted by the patient on 10/22/2019   Chronic problems general questions HPI Form  If you checked off any problems, how difficult have these problems made it for you to do your work, take care of things at home, or get along with other people?: Extremely difficult  PHQ9 TOTAL SCORE: 15    She notes she has sold the house recently and she is living in an apartment separate from her  who she is in the process of . She reports that she was having hives when they sold the house and she saw him, but now just relates her rash is still due to stress.     She reports SI last week without plan as she was overwhelmed and was exhausted and stressed with everything.     She is still seeing the same man she has been who is half the time in [place] and the other half in [place].     Patient Active Problem List    Diagnosis     Migraine with aura     Fatigue     Surveillance of previously prescribed intrauterine contraceptive device     Anemia     CARDIOVASCULAR SCREENING; LDL GOAL LESS THAN 160     PTSD (post-traumatic stress disorder)     Health Care Home     Generalized anxiety disorder     Colitis     GERD (gastroesophageal reflux disease)     Crohn's ileitis (H)     Malabsorption     Inflammatory bowel disease (Crohn's disease) (H)     Iron deficiency anemia     Vitamin B 12 deficiency     Irritable bowel syndrome     Past Surgical History:   Procedure Laterality Date     COLONOSCOPY  10/04/06    [facility]      COLONOSCOPY  12/20/2013    Procedure: COMBINED COLONOSCOPY, SINGLE BIOPSY/POLYPECTOMY BY BIOPSY;;  Surgeon: Presley Ocampo MD;  Location: [facility] OR     HC REMOVE INTRAUTERINE DEVICE  09/02/08     HC UGI ENDOSCOPY, SIMPLE EXAM  10/4/2006     LAPAROSCOPIC CHOLECYSTECTOMY  5/16/2011    Procedure:LAPAROSCOPIC CHOLECYSTECTOMY; Surgeon:LIYAH AVELAR; Location:[facility] OR       Social History     Tobacco Use     Smoking status: Never Smoker     Smokeless tobacco: Never Used   Substance Use Topics     Alcohol use: No     Alcohol/week: 0.0 standard drinks     Family History   Problem Relation Age of Onset     Hypertension Mother      Alzheimer Disease Maternal Grandmother      Asthma No family hx of      C.A.D. No family hx of      Diabetes No family hx of      Cerebrovascular Disease No family hx of      Breast Cancer No family hx of      Cancer - colorectal No family hx of      Prostate Cancer No family hx of      Alcohol/Drug No family hx of      Allergies No family hx of      Anesthesia Reaction No family hx of      Arthritis No family hx of      Blood Disease No family hx of      Cancer No family hx of      Circulatory No family hx of      Depression No family hx of      Endocrine Disease No family hx of      Eye Disorder No family hx of      Genetic Disorder No family hx of      Gynecology No family hx of       Gastrointestinal Disease No family hx of      Genitourinary Problems No family hx of      Heart Disease No family hx of      Lipids No family hx of      Neurologic Disorder No family hx of      Obesity No family hx of      Hearing Loss No family hx of      Respiratory No family hx of      Osteoporosis No family hx of      Musculoskeletal Disorder No family hx of      Thyroid Disease No family hx of      Psychotic Disorder No family hx of      Cardiovascular No family hx of      Congenital Anomalies No family hx of      Connective Tissue Disorder No family hx of      Coronary Artery Disease No family hx of      Hyperlipidemia No family hx of      Ovarian Cancer No family hx of      Depression/Anxiety No family hx of      Thyroid Disease No family hx of      Chemical Addiction No family hx of      Known Genetic Syndrome No family hx of      Anxiety Disorder No family hx of      Mental Illness No family hx of      Substance Abuse No family hx of      Colon Cancer No family hx of      Other Cancer No family hx of      Glaucoma No family hx of      Macular Degeneration No family hx of          Current Outpatient Medications   Medication Sig Dispense Refill     Acetaminophen (TYLENOL PO) Take 500 mg by mouth       azithromycin (ZITHROMAX) 250 MG tablet 2 tablets daily on day one, then one tablet po daily until gone. 6 tablet 0     benzonatate (TESSALON) 100 MG capsule Take 1 capsule (100 mg) by mouth 3 times daily as needed for cough 30 capsule 0     calcium carbonate-vitamin D (OS-RUDOLPH) 500-400 MG-UNIT tablet Take 1 tablet by mouth daily       FLUoxetine 20 MG tablet Take 3 tablets (60 mg) by mouth daily 270 tablet 1     Magnesium 125 MG CAPS Take by mouth daily       melatonin 3 MG tablet Take 2 hours before bedtime       MIRENA 20 MCG/24HR IU IUD intrauterine uterine device placed 1 Intra Uterine Device 0     multivitamin, therapeutic with minerals (MULTI-VITAMIN) TABS Take 1 tablet by mouth daily       Omega-3 Fatty  Acids (OMEGA ESSENTIALS BASIC PO) Take by mouth daily       pantoprazole (PROTONIX) 40 MG EC tablet Take 1 tablet (40 mg) by mouth daily 90 tablet 3     Probiotic Product (PROBIOTIC ADVANCED PO)        vedolizumab (ENTYVIO) 60 MG/ML injection Inject 5 mLs (300 mg) into the vein every 2 months - NOt true SIG> 1 each 0     Allergies   Allergen Reactions     Cimzia [Certolizumab Pegol] Other (See Comments)     Redness and swelling @ injection site     Humira Other (See Comments)     Inflammation, redness and swelling @ injection site     Remicade [Infliximab] Swelling     Banana      Anaphylaxis     Latex      Anaphylaxis     Prilosec [Omeprazole Magnesium] Diarrhea     Recent Labs   Lab Test 04/05/19  0927 11/30/18  0844 10/05/18  0851  01/02/18  2147 12/26/17  1910  05/22/17  0959  01/09/15  0816  06/24/13  1040 07/12/12  0819   LDL  --   --   --   --   --   --   --  73  --   --   --   --  79   HDL  --   --   --   --   --   --   --  31*  --   --   --   --  28*   TRIG  --   --   --   --   --   --   --  75  --   --   --   --  83   ALT 30 29 16   < > 14 13   < >  --    < > 9   < > 16  --    CR  --   --   --   --  0.90 0.80   < >  --    < > 0.86   < > 0.73  --    GFRESTIMATED  --   --   --   --  70 80   < >  --    < > 76   < > >90  --    GFRESTBLACK  --   --   --   --  85 >90   < >  --    < > >90   GFR Calc     < > >90  --    POTASSIUM  --   --   --   --  3.4 4.0   < >  --    < > 3.4   < > 3.8  --    TSH  --   --   --   --   --   --   --   --   --  2.49  --  2.16 2.11    < > = values in this interval not displayed.      BP Readings from Last 3 Encounters:   10/22/19 90/66   09/05/19 117/72   06/21/19 113/72    Wt Readings from Last 3 Encounters:   10/22/19 62.2 kg (137 lb 3.2 oz)   09/05/19 62.2 kg (137 lb 3.2 oz)   06/21/19 61.7 kg (136 lb)                    Reviewed and updated as needed this visit by Provider  Tobacco  Allergies  Meds  Problems  Med Hx  Surg Hx  Fam Hx         Review of Systems  "  Respiratory: Positive for cough.       This document serves as a record of the services and decisions personally performed and made by Rossi Ambrose CNP. It was created on his/her behalf by Negin Cohen, trained medical scribe. The creation of this document is based the provider's statements to the medical scribes.    Valentino Cohen 3:51 PM, October 22, 2019      Objective    BP 90/66   Pulse 70   Temp 98.3  F (36.8  C) (Oral)   Resp 18   Ht 1.676 m (5' 6\")   Wt 62.2 kg (137 lb 3.2 oz)   LMP  (LMP Unknown)   SpO2 99%   Breastfeeding? No   BMI 22.14 kg/m    Body mass index is 22.14 kg/m .     Physical Exam   GENERAL APPEARANCE: healthy, alert and no distress  EYES: Eyes grossly normal to inspection and conjunctivae and sclerae normal  HENT: ear canals and TM's normal, nose and mouth without ulcers or lesions and nasal mucosa edematous with mild rhinorrhea and mild erythema of posterior oral pharynx.   NECK: no adenopathy, no asymmetry, masses, or scars and thyroid normal to palpation  RESP: lungs clear to auscultation - no rales, rhonchi or wheezes  Abd:Surgical cars noted- calm, no current tenderness  CV: regular rates and rhythm, normal S1 S2, no S3 or S4 and no murmur, click or rub  NEURO: Normal strength and tone, mentation intact and speech normal  PSYCH: mentation appears normal, affect normal/bright    Diagnostic Test Results:  Labs reviewed in Epic  No results found for this or any previous visit (from the past 24 hour(s)).        Assessment & Plan       ICD-10-CM    1. Bronchitis J40 azithromycin (ZITHROMAX) 250 MG tablet     benzonatate (TESSALON) 100 MG capsule   2. Generalized anxiety disorder F41.1 MENTAL HEALTH REFERRAL  - Adult; Outpatient Treatment; Individual/Couples/Family/Group Therapy/Health Psychology; Other: [facility] [phone]; We will contact you to schedule the appointment or please call with any questi...   3. Major depressive disorder, recurrent " episode, moderate (H) F33.1 MENTAL HEALTH REFERRAL  - Adult; Outpatient Treatment; Individual/Couples/Family/Group Therapy/Health Psychology; Other: [facility] [phone]; We will contact you to schedule the appointment or please call with any questi...   4. Colitis K52.9      Reviewed symptoms and etiology that patient presents with today. Recent negative RST on 10/4/19. Advised starting Zithromax 250 mg; Rx provided Reviewed directions, benefits, and side effects of medication with patient today. home bronchitis cares, antibiotic with short course to avoid colitis irritation. Is currently on an immunosuppressive.  Had flu vaccine.     Discussed mood at length with patient. She reports passive SI without plan surrounding stress of her divorce. Mental health referral provided for patient to schedule.     Colitis is stable without abdominal pain. Patient notes slight discomfort at surgical scar site. Discussed massaging the site for relief.     Follow up with PCP in 6 months for medication management visit or prn.     The information in this document, created by the medical scribe for me, accurately reflects the services I personally performed and the decisions made by me. I have reviewed and approved this document for accuracy prior to leaving the patient care area.  Rossi Ambrose CNP  3:51 PM, 10/22/19    FRANCESCA Jones CNP  [facility]       Crescentic Advancement Flap Text: The defect edges were debeveled with a #15 scalpel blade. Given the location of the defect and the proximity to free margins a crescentic advancement flap was deemed most appropriate. Using a sterile surgical marker, the appropriate advancement flap was drawn incorporating the defect and placing the expected incisions within the relaxed skin tension lines where possible. The area thus outlined was incised deep to adipose tissue with a #15 scalpel blade. The skin margins were undermined to an appropriate distance in all directions utilizing iris scissors. Following this, the designed flap was advanced and carried over into the primary defect and sutured into place.

## 2024-01-06 ENCOUNTER — HEALTH MAINTENANCE LETTER (OUTPATIENT)
Age: 44
End: 2024-01-06

## 2024-03-16 ENCOUNTER — HEALTH MAINTENANCE LETTER (OUTPATIENT)
Age: 44
End: 2024-03-16

## 2025-02-12 ENCOUNTER — PATIENT OUTREACH (OUTPATIENT)
Dept: CARE COORDINATION | Facility: CLINIC | Age: 45
End: 2025-02-12

## 2025-02-12 ASSESSMENT — ACTIVITIES OF DAILY LIVING (ADL): DEPENDENT_IADLS:: INDEPENDENT

## 2025-02-12 NOTE — PROGRESS NOTES
The patient consented via Verbal consent to have contact information and resources sent via email in an unencrypted manner.    TALHA Howell, [facility]  , Care Coordination   [facility]   [phone]  [email or web address]

## 2025-02-22 ENCOUNTER — HEALTH MAINTENANCE LETTER (OUTPATIENT)
Age: 45
End: 2025-02-22

## 2025-04-11 ENCOUNTER — PATIENT OUTREACH (OUTPATIENT)
Dept: CARE COORDINATION | Facility: CLINIC | Age: 45
End: 2025-04-11